# Patient Record
Sex: FEMALE | Race: OTHER | HISPANIC OR LATINO | Employment: FULL TIME | URBAN - METROPOLITAN AREA
[De-identification: names, ages, dates, MRNs, and addresses within clinical notes are randomized per-mention and may not be internally consistent; named-entity substitution may affect disease eponyms.]

---

## 2017-01-14 ENCOUNTER — HOSPITAL ENCOUNTER (OUTPATIENT)
Dept: LAB | Facility: MEDICAL CENTER | Age: 27
End: 2017-01-14
Attending: OBSTETRICS & GYNECOLOGY
Payer: COMMERCIAL

## 2017-01-14 LAB
ERYTHROCYTE [DISTWIDTH] IN BLOOD BY AUTOMATED COUNT: 46.2 FL (ref 35.9–50)
EST. AVERAGE GLUCOSE BLD GHB EST-MCNC: 94 MG/DL
GLUCOSE 1H P CHAL SERPL-MCNC: 148 MG/DL (ref 65–180)
GLUCOSE 2H P CHAL SERPL-MCNC: 174 MG/DL (ref 65–155)
GLUCOSE 3H P CHAL SERPL-MCNC: 125 MG/DL (ref 65–140)
GLUCOSE PRE 100 G GLC PO SERPL-MCNC: 85 MG/DL (ref 65–95)
HBA1C MFR BLD: 4.9 % (ref 0–5.6)
HCT VFR BLD AUTO: 41.1 % (ref 37–47)
HGB BLD-MCNC: 14 G/DL (ref 12–16)
MCH RBC QN AUTO: 33.3 PG (ref 27–33)
MCHC RBC AUTO-ENTMCNC: 34.1 G/DL (ref 33.6–35)
MCV RBC AUTO: 97.6 FL (ref 81.4–97.8)
PLATELET # BLD AUTO: 235 K/UL (ref 164–446)
PMV BLD AUTO: 10.9 FL (ref 9–12.9)
RBC # BLD AUTO: 4.21 M/UL (ref 4.2–5.4)
WBC # BLD AUTO: 10.9 K/UL (ref 4.8–10.8)

## 2017-01-14 PROCEDURE — 85027 COMPLETE CBC AUTOMATED: CPT

## 2017-01-14 PROCEDURE — 82951 GLUCOSE TOLERANCE TEST (GTT): CPT

## 2017-01-14 PROCEDURE — 83036 HEMOGLOBIN GLYCOSYLATED A1C: CPT

## 2017-01-14 PROCEDURE — 82952 GTT-ADDED SAMPLES: CPT

## 2017-01-14 PROCEDURE — 36415 COLL VENOUS BLD VENIPUNCTURE: CPT

## 2017-02-11 ENCOUNTER — HOSPITAL ENCOUNTER (INPATIENT)
Facility: MEDICAL CENTER | Age: 27
LOS: 12 days | End: 2017-02-23
Attending: OBSTETRICS & GYNECOLOGY | Admitting: OBSTETRICS & GYNECOLOGY
Payer: COMMERCIAL

## 2017-02-11 ENCOUNTER — APPOINTMENT (OUTPATIENT)
Dept: RADIOLOGY | Facility: MEDICAL CENTER | Age: 27
End: 2017-02-11
Attending: OBSTETRICS & GYNECOLOGY
Payer: COMMERCIAL

## 2017-02-11 LAB
APPEARANCE UR: CLEAR
BASOPHILS # BLD AUTO: 0.2 % (ref 0–1.8)
BASOPHILS # BLD: 0.02 K/UL (ref 0–0.12)
COLOR UR AUTO: YELLOW
CRYSTALS AMN MICRO: NORMAL
EOSINOPHIL # BLD AUTO: 0.06 K/UL (ref 0–0.51)
EOSINOPHIL NFR BLD: 0.6 % (ref 0–6.9)
ERYTHROCYTE [DISTWIDTH] IN BLOOD BY AUTOMATED COUNT: 42.9 FL (ref 35.9–50)
GLUCOSE UR QL STRIP.AUTO: NEGATIVE MG/DL
HCT VFR BLD AUTO: 38.2 % (ref 37–47)
HGB BLD-MCNC: 13 G/DL (ref 12–16)
HOLDING TUBE BB 8507: NORMAL
IMM GRANULOCYTES # BLD AUTO: 0.06 K/UL (ref 0–0.11)
IMM GRANULOCYTES NFR BLD AUTO: 0.6 % (ref 0–0.9)
KETONES UR QL STRIP.AUTO: NEGATIVE MG/DL
LEUKOCYTE ESTERASE UR QL STRIP.AUTO: NEGATIVE
LYMPHOCYTES # BLD AUTO: 2.51 K/UL (ref 1–4.8)
LYMPHOCYTES NFR BLD: 26.3 % (ref 22–41)
MCH RBC QN AUTO: 31.7 PG (ref 27–33)
MCHC RBC AUTO-ENTMCNC: 34 G/DL (ref 33.6–35)
MCV RBC AUTO: 93.2 FL (ref 81.4–97.8)
MONOCYTES # BLD AUTO: 0.68 K/UL (ref 0–0.85)
MONOCYTES NFR BLD AUTO: 7.1 % (ref 0–13.4)
NEUTROPHILS # BLD AUTO: 6.2 K/UL (ref 2–7.15)
NEUTROPHILS NFR BLD: 65.2 % (ref 44–72)
NITRITE UR QL STRIP.AUTO: NEGATIVE
NRBC # BLD AUTO: 0 K/UL
NRBC BLD AUTO-RTO: 0 /100 WBC
PH UR STRIP.AUTO: 7 [PH]
PLATELET # BLD AUTO: 226 K/UL (ref 164–446)
PMV BLD AUTO: 10.7 FL (ref 9–12.9)
PROT UR QL STRIP: NEGATIVE MG/DL
RBC # BLD AUTO: 4.1 M/UL (ref 4.2–5.4)
RBC UR QL AUTO: NEGATIVE
SP GR UR: 1.01
WBC # BLD AUTO: 9.5 K/UL (ref 4.8–10.8)

## 2017-02-11 PROCEDURE — 85025 COMPLETE CBC W/AUTO DIFF WBC: CPT

## 2017-02-11 PROCEDURE — A9270 NON-COVERED ITEM OR SERVICE: HCPCS | Performed by: OBSTETRICS & GYNECOLOGY

## 2017-02-11 PROCEDURE — 59025 FETAL NON-STRESS TEST: CPT | Performed by: OBSTETRICS & GYNECOLOGY

## 2017-02-11 PROCEDURE — 36415 COLL VENOUS BLD VENIPUNCTURE: CPT

## 2017-02-11 PROCEDURE — 700111 HCHG RX REV CODE 636 W/ 250 OVERRIDE (IP): Performed by: OBSTETRICS & GYNECOLOGY

## 2017-02-11 PROCEDURE — 89060 EXAM SYNOVIAL FLUID CRYSTALS: CPT

## 2017-02-11 PROCEDURE — 81002 URINALYSIS NONAUTO W/O SCOPE: CPT

## 2017-02-11 PROCEDURE — 700105 HCHG RX REV CODE 258: Performed by: OBSTETRICS & GYNECOLOGY

## 2017-02-11 PROCEDURE — 770002 HCHG ROOM/CARE - OB PRIVATE (112)

## 2017-02-11 PROCEDURE — 700102 HCHG RX REV CODE 250 W/ 637 OVERRIDE(OP): Performed by: OBSTETRICS & GYNECOLOGY

## 2017-02-11 PROCEDURE — 302790 HCHG STAT ANTEPARTUM CARE, DAILY

## 2017-02-11 PROCEDURE — 87653 STREP B DNA AMP PROBE: CPT

## 2017-02-11 PROCEDURE — 76815 OB US LIMITED FETUS(S): CPT

## 2017-02-11 RX ORDER — NIFEDIPINE 10 MG/1
20 CAPSULE ORAL PRN
Status: DISCONTINUED | OUTPATIENT
Start: 2017-02-11 | End: 2017-02-23 | Stop reason: HOSPADM

## 2017-02-11 RX ORDER — AMPICILLIN 2 G/1
2 INJECTION, POWDER, FOR SOLUTION INTRAVENOUS EVERY 6 HOURS
Status: COMPLETED | OUTPATIENT
Start: 2017-02-11 | End: 2017-02-13

## 2017-02-11 RX ORDER — TERBUTALINE SULFATE 1 MG/ML
0.25 INJECTION, SOLUTION SUBCUTANEOUS PRN
Status: DISCONTINUED | OUTPATIENT
Start: 2017-02-11 | End: 2017-02-21

## 2017-02-11 RX ORDER — DIPHENHYDRAMINE HCL 25 MG
25 TABLET ORAL NIGHTLY PRN
Status: DISCONTINUED | OUTPATIENT
Start: 2017-02-11 | End: 2017-02-23 | Stop reason: HOSPADM

## 2017-02-11 RX ORDER — BETAMETHASONE SODIUM PHOSPHATE AND BETAMETHASONE ACETATE 3; 3 MG/ML; MG/ML
12 INJECTION, SUSPENSION INTRA-ARTICULAR; INTRALESIONAL; INTRAMUSCULAR; SOFT TISSUE EVERY 24 HOURS
Status: COMPLETED | OUTPATIENT
Start: 2017-02-11 | End: 2017-02-12

## 2017-02-11 RX ORDER — DOCUSATE SODIUM 100 MG/1
100 CAPSULE, LIQUID FILLED ORAL 2 TIMES DAILY PRN
Status: DISCONTINUED | OUTPATIENT
Start: 2017-02-11 | End: 2017-02-23 | Stop reason: HOSPADM

## 2017-02-11 RX ORDER — ACETAMINOPHEN 325 MG/1
650 TABLET ORAL EVERY 4 HOURS PRN
Status: DISCONTINUED | OUTPATIENT
Start: 2017-02-11 | End: 2017-02-23 | Stop reason: HOSPADM

## 2017-02-11 RX ADMIN — BETAMETHASONE SODIUM PHOSPHATE AND BETAMETHASONE ACETATE 12 MG: 3; 3 INJECTION, SUSPENSION INTRA-ARTICULAR; INTRALESIONAL; INTRAMUSCULAR at 18:17

## 2017-02-11 RX ADMIN — AMPICILLIN SODIUM 2 G: 2 INJECTION, POWDER, FOR SOLUTION INTRAMUSCULAR; INTRAVENOUS at 23:30

## 2017-02-11 RX ADMIN — ERYTHROMYCIN LACTOBIONATE 250 MG: 500 INJECTION, POWDER, LYOPHILIZED, FOR SOLUTION INTRAVENOUS at 21:32

## 2017-02-11 RX ADMIN — DIPHENHYDRAMINE HCL 25 MG: 25 TABLET ORAL at 21:40

## 2017-02-11 RX ADMIN — AMPICILLIN SODIUM 2 G: 2 INJECTION, POWDER, FOR SOLUTION INTRAMUSCULAR; INTRAVENOUS at 17:30

## 2017-02-11 ASSESSMENT — COPD QUESTIONNAIRES
COPD SCREENING SCORE: 0
HAVE YOU SMOKED AT LEAST 100 CIGARETTES IN YOUR ENTIRE LIFE: NO/DON'T KNOW
DURING THE PAST 4 WEEKS HOW MUCH DID YOU FEEL SHORT OF BREATH: NONE/LITTLE OF THE TIME
DO YOU EVER COUGH UP ANY MUCUS OR PHLEGM?: NO/ONLY WITH OCCASIONAL COLDS OR INFECTIONS

## 2017-02-11 ASSESSMENT — PAIN SCALES - GENERAL
PAINLEVEL_OUTOF10: 0
PAINLEVEL_OUTOF10: 0

## 2017-02-11 ASSESSMENT — LIFESTYLE VARIABLES
EVER_SMOKED: NEVER
DO YOU DRINK ALCOHOL: NO
DO YOU DRINK ALCOHOL: NO

## 2017-02-11 ASSESSMENT — PATIENT HEALTH QUESTIONNAIRE - PHQ9
SUM OF ALL RESPONSES TO PHQ QUESTIONS 1-9: 0
2. FEELING DOWN, DEPRESSED, IRRITABLE, OR HOPELESS: NOT AT ALL
SUM OF ALL RESPONSES TO PHQ9 QUESTIONS 1 AND 2: 0
1. LITTLE INTEREST OR PLEASURE IN DOING THINGS: NOT AT ALL

## 2017-02-11 NOTE — IP AVS SNAPSHOT
[facility] Access Code: Activation code not generated  Current [facility] Status: Active    SingOnhart  A secure, online tool to manage your health information     [employer]’s [facility]® is a secure, online tool that connects you to your personalized health information from the privacy of your home -- day or night - making it very easy for you to manage your healthcare. Once the activation process is completed, you can even access your medical information using the [facility] cailin, which is available for free in the Apple Cailin store or Google Play store.     [facility] provides the following levels of access (as shown below):   My Chart Features   [facility] Primary Care Doctor [facility]  Specialists [facility]  Urgent  Care Non-[facility]  Primary Care  Doctor   Email your healthcare team securely and privately 24/7 X X X X   Manage appointments: schedule your next appointment; view details of past/upcoming appointments X      Request prescription refills. X      View recent personal medical records, including lab and immunizations X X X X   View health record, including health history, allergies, medications X X X X   Read reports about your outpatient visits, procedures, consult and ER notes X X X X   See your discharge summary, which is a recap of your hospital and/or ER visit that includes your diagnosis, lab results, and care plan. X X       How to register for [facility]:  1. Go to  [email or web address].  2. Click on the Sign Up Now box, which takes you to the New Member Sign Up page. You will need to provide the following information:  a. Enter your [facility] Access Code exactly as it appears at the top of this page. (You will not need to use this code after you’ve completed the sign-up process. If you do not sign up before the expiration date, you must request a new code.)   b. Enter your date of birth.   c. Enter your home email address.   d. Click Submit, and follow the next screen’s instructions.  3. Create a [facility] ID. This will  be your [employer] Stack login ID and cannot be changed, so think of one that is secure and easy to remember.  4. Create a [employer] password. You can change your password at any time.  5. Enter your Password Reset Question and Answer. This can be used at a later time if you forget your password.   6. Enter your e-mail address. This allows you to receive e-mail notifications when new information is available in [employer] Stack.  7. Click Sign Up. You can now view your health information.    For assistance activating your [employer] account, call [phone]

## 2017-02-11 NOTE — IP AVS SNAPSHOT
2/23/2017          Dee Schreiber  [address] [place] [address]    Dear Dee:    [facility] wants to ensure your discharge home is safe and you or your loved ones have had all your questions answered regarding your care after you leave the hospital.    You may receive a telephone call within two days of your discharge.  This call is to make certain you understand your discharge instructions as well as ensure we provided you with the best care possible during your stay with us.     The call will only last approximately 3-5 minutes and will be done by a nurse.    Once again, we want to ensure your discharge home is safe and that you have a clear understanding of any next steps in your care.  If you have any questions or concerns, please do not hesitate to contact us, we are here for you.  Thank you for choosing [facility] for your healthcare needs.    Sincerely,    Josh Roblero    [facility]

## 2017-02-11 NOTE — IP AVS SNAPSHOT
Home Care Instructions                                                                                                                Dee Schreiber   MRN: [id number]    Department:  POST PARTUM 31   2017           Follow-up Information     1. Follow up with Rachel Gardner M.D. In 6 weeks.    Specialty:  OB/Gyn    Why:  Postpartum examination.    Contact information    [address]  [place] [address]  [phone]          2. Follow up with Rachel Gardner M.D. In 2 weeks.    Specialty:  OB/Gyn    Why:  follow up on depressed mood.    Contact information    [address]  [place] [address]  [phone]         I assume responsibility for securing a follow-up  Screening blood test on my baby within the specified date range.    -                  Discharge Instructions       POSTPARTUM DISCHARGE INSTRUCTIONS FOR MOM    YOB: 1990   Age: 26 y.o.               Admit Date: 2017     Discharge Date: 2017  Attending Doctor:  Rachel Gardner M.D.                  Allergies:  Nkda    Discharged to home by car. Discharged via walking, hospital escort: Yes.  Special equipment needed: Not Applicable  Belongings with: Personal  Be sure to schedule a follow-up appointment with your primary care doctor or any specialists as instructed.     Discharge Plan:   Diet Plan: Discussed  Activity Level: Discussed  Confirmed Follow up Appointment: Patient to Call and Schedule Appointment  Confirmed Symptoms Management: Discussed  Medication Reconciliation Updated: Yes  Influenza Vaccine Indication: Not indicated: Previously immunized this influenza season and > 8 years of age    REASONS TO CALL YOUR OBSTETRICIAN:  1.   Persistent fever or shaking chills (Temperature higher than 100.4)  2.   Heavy bleeding (soaking more than 1 pad per hour); Passing clots  3.   Foul odor from vagina  4.   Mastitis (Breast infection; breast pain, chills, fever, redness)  5.   Urinary pain, burning or  "frequency  6.   Episiotomy infection  7.   Abdominal incision infection  8.   Severe depression longer than 24 hours    HAND WASHING  · Prior to handling the baby.  · Before breastfeeding or bottle feeding baby.  · After using the bathroom or changing the baby's diaper.    WOUND CARE  Ask your physician for additional care instructions.  In general:    ·  Incision:      · Keep clean and dry.    · Do NOT lift anything heavier than your baby for up to 6 weeks.    · There should not be any opening or pus.      VAGINAL CARE  · Nothing inside vagina for 6 weeks: no sexual intercourse, tampons or douching.  · Bleeding may continue for 2-4 weeks.  Amount may vary.    · Call your physician for heavy bleeding which means soaking more than 1 pad per hour    BIRTH CONTROL  · It is possible to become pregnant at any time after delivery and while breastfeeding.  · Plan to discuss a method of birth control with your physician at your follow up visit. visit.    DIET AND ELIMINATION  · Eating more fiber (bran cereal, fruits, and vegetables) and drinking plenty of fluids will help to avoid constipation.  · Urinary frequency after childbirth is normal.    POSTPARTUM BLUES  During the first few days after birth, you may experience a sense of the \"blues\" which may include impatience, irritability or even crying.  These feeling come and go quickly.  However, as many as 1 in 10 women experience emotional symptoms known as postpartum depression.    Postpartum depression:  May start as early as the second or third day after delivery or take several weeks or months to develop.  Symptoms of \"blues\" are present, but are more intense:  Crying spells; loss of appetite; feelings of hopelessness or loss of control; fear of touching the baby; over concern or no concern at all about the baby; little or no concern about your own appearance/caring for yourself; and/or inability to sleep or excessive sleeping.  Contact your physician if you " "are experiencing any of these symptoms.    Crisis Hotline:  · [place] Crisis Hotline:  3-263-SJBOUZW  Or 1-872.149.4321  · [place] Crisis Hotline:  1-467.254.6888  Or 892-983-9035    PREVENTING SHAKEN BABY:  If you are angry or stressed, PUT THE BABY IN THE CRIB, step away, take some deep breaths, and wait until you are calm to care for the baby.  DO NOT SHAKE THE BABY.  You are not alone, call a supporter for help.    · Crisis Call Center 24/7 crisis line 506-870-1929 or 1-817.649.6085  · You can also text them, text \"ANSWER\" to 229119    QUIT SMOKING/TOBACCO USE:  I understand the use of any tobacco products increases my chance of suffering from future heart disease and could cause other illnesses which may shorten my life. Quitting the use of tobacco products is the single most important thing I can do to improve my health. For further information on smoking / tobacco cessation call a Toll Free Quit Line at 1-973.312.3436 (*National [employer] [place]) or 1-693.943.7988 (American Lung Association) or you can access the web based program at www.lungusa.org.    · [place] Tobacco Users Help Line:  (564) 993-1734       Toll Free: 1-276.714.1409  · Quit Tobacco Program [facility] (270)387-1076    DEPRESSION / SUICIDE RISK:  As you are discharged from this [facility], it is important to learn how to keep safe from harming yourself.    Recognize the warning signs:  · Abrupt changes in personality, positive or negative- including increase in energy   · Giving away possessions  · Change in eating patterns- significant weight changes-  positive or negative  · Change in sleeping patterns- unable to sleep or sleeping all the time   · Unwillingness or inability to communicate  · Depression  · Unusual sadness, discouragement and loneliness  · Talk of wanting to die  · Neglect of personal appearance   · Rebelliousness- reckless behavior  · Withdrawal from people/activities they love  · Confusion- " inability to concentrate     If you or a loved one observes any of these behaviors or has concerns about self-harm, here's what you can do:  · Talk about it- your feelings and reasons for harming yourself  · Remove any means that you might use to hurt yourself (examples: pills, rope, extension cords, firearm)  · Get professional help from the community (Mental Health, Substance Abuse, psychological counseling)  · Do not be alone:Call your Safe Contact- someone whom you trust who will be there for you.  · Call your local CRISIS HOTLINE 766-7697 or 951-340-3659  · Call your local Children's Mobile Crisis Response Team [place] (989) 412-6953 or www.NanoGram  · Call the toll free National Suicide Prevention Hotlines   · National Suicide Prevention Lifeline 421-315-NSKM (0127)  · National Hope Line Network 800-SUICIDE (042-7136)    DISCHARGE SURVEY:  Thank you for choosing [facility].  We hope we provided you with very good care.  You may be receiving a survey in the mail.  Please fill it out.  Your opinion is valuable to us.    ADDITIONAL EDUCATIONAL MATERIALS GIVEN TO PATIENT:        My signature on this form indicates that:  1.  I have reviewed and understand the above information  2.  My questions regarding this information have been answered to my satisfaction.  3.  I have formulated a plan with my discharge nurse to obtain my prescribed medication for home.         Discharge Medication Instructions:    Below are the medications your physician expects you to take upon discharge:    Review all your home medications and newly ordered medications with your doctor and/or pharmacist. Follow medication instructions as directed by your doctor and/or pharmacist.    Please keep your medication list with you and share with your physician.               Medication List      START taking these medications        Instructions    ibuprofen 600 MG Tabs   Last time this was given:  600 mg on 2/23/2017  3:23 AM      Commonly known as:  MOTRIN    Take 1 Tab by mouth every 6 hours as needed (For cramping after delivery; do not give if patient is receiving ketorolac (Toradol)).   Dose:  600 mg       oxycodone-acetaminophen 5-325 MG Tabs   Last time this was given:  1 Tab on 2/23/2017  3:23 AM   Commonly known as:  PERCOCET    Take 1 Tab by mouth every four hours as needed (for Moderate Pain (Pain Scale 4-6) after delivery).   Dose:  1 Tab         CONTINUE taking these medications        Instructions    PRENATAL PO   Last time this was given:  1 Tab on 2/23/2017  7:58 AM    Take 2 Tabs by mouth every day.   Dose:  2 Tab               Crisis Hotline:     [place] Crisis Hotline:  5-679-DBMBDKZ or 1-678.604.2678    [place] Crisis Hotline:    1-494.901.2933 or 579-480-0318        Disclaimer           _____________________________________                     __________       ________       Patient/Mother Signature or Legal                          Date                   Time

## 2017-02-12 LAB — GP B STREP DNA SPEC QL NAA+PROBE: POSITIVE

## 2017-02-12 PROCEDURE — 59025 FETAL NON-STRESS TEST: CPT | Performed by: OBSTETRICS & GYNECOLOGY

## 2017-02-12 PROCEDURE — 700102 HCHG RX REV CODE 250 W/ 637 OVERRIDE(OP): Performed by: OBSTETRICS & GYNECOLOGY

## 2017-02-12 PROCEDURE — 700111 HCHG RX REV CODE 636 W/ 250 OVERRIDE (IP): Performed by: OBSTETRICS & GYNECOLOGY

## 2017-02-12 PROCEDURE — 770002 HCHG ROOM/CARE - OB PRIVATE (112)

## 2017-02-12 PROCEDURE — A9270 NON-COVERED ITEM OR SERVICE: HCPCS | Performed by: OBSTETRICS & GYNECOLOGY

## 2017-02-12 PROCEDURE — 36415 COLL VENOUS BLD VENIPUNCTURE: CPT

## 2017-02-12 PROCEDURE — 700105 HCHG RX REV CODE 258: Performed by: OBSTETRICS & GYNECOLOGY

## 2017-02-12 RX ORDER — AMOXICILLIN 250 MG/1
250 CAPSULE ORAL EVERY 8 HOURS
Status: COMPLETED | OUTPATIENT
Start: 2017-02-13 | End: 2017-02-18

## 2017-02-12 RX ORDER — AMPICILLIN 2 G/1
INJECTION, POWDER, FOR SOLUTION INTRAVENOUS
Status: ACTIVE
Start: 2017-02-12 | End: 2017-02-12

## 2017-02-12 RX ORDER — SODIUM CHLORIDE, SODIUM LACTATE, POTASSIUM CHLORIDE, CALCIUM CHLORIDE 600; 310; 30; 20 MG/100ML; MG/100ML; MG/100ML; MG/100ML
INJECTION, SOLUTION INTRAVENOUS
Status: ACTIVE
Start: 2017-02-12 | End: 2017-02-13

## 2017-02-12 RX ORDER — ERYTHROMYCIN 250 MG/1
250 TABLET, DELAYED RELEASE ORAL 3 TIMES DAILY
Status: COMPLETED | OUTPATIENT
Start: 2017-02-13 | End: 2017-02-18

## 2017-02-12 RX ADMIN — ERYTHROMYCIN LACTOBIONATE 250 MG: 500 INJECTION, POWDER, LYOPHILIZED, FOR SOLUTION INTRAVENOUS at 22:00

## 2017-02-12 RX ADMIN — AMPICILLIN SODIUM 2 G: 2 INJECTION, POWDER, FOR SOLUTION INTRAMUSCULAR; INTRAVENOUS at 23:30

## 2017-02-12 RX ADMIN — ERYTHROMYCIN LACTOBIONATE 250 MG: 500 INJECTION, POWDER, LYOPHILIZED, FOR SOLUTION INTRAVENOUS at 11:25

## 2017-02-12 RX ADMIN — DIPHENHYDRAMINE HCL 25 MG: 25 TABLET ORAL at 22:40

## 2017-02-12 RX ADMIN — AMPICILLIN SODIUM 2 G: 2 INJECTION, POWDER, FOR SOLUTION INTRAMUSCULAR; INTRAVENOUS at 06:00

## 2017-02-12 RX ADMIN — AMPICILLIN SODIUM 2 G: 2 INJECTION, POWDER, FOR SOLUTION INTRAMUSCULAR; INTRAVENOUS at 17:30

## 2017-02-12 RX ADMIN — ERYTHROMYCIN LACTOBIONATE 250 MG: 500 INJECTION, POWDER, LYOPHILIZED, FOR SOLUTION INTRAVENOUS at 04:25

## 2017-02-12 RX ADMIN — ERYTHROMYCIN LACTOBIONATE 250 MG: 500 INJECTION, POWDER, LYOPHILIZED, FOR SOLUTION INTRAVENOUS at 16:25

## 2017-02-12 RX ADMIN — AMPICILLIN SODIUM 2 G: 2 INJECTION, POWDER, FOR SOLUTION INTRAMUSCULAR; INTRAVENOUS at 12:29

## 2017-02-12 RX ADMIN — BETAMETHASONE SODIUM PHOSPHATE AND BETAMETHASONE ACETATE 12 MG: 3; 3 INJECTION, SUSPENSION INTRA-ARTICULAR; INTRALESIONAL; INTRAMUSCULAR at 17:24

## 2017-02-12 ASSESSMENT — COPD QUESTIONNAIRES
DURING THE PAST 4 WEEKS HOW MUCH DID YOU FEEL SHORT OF BREATH: NONE/LITTLE OF THE TIME
DO YOU EVER COUGH UP ANY MUCUS OR PHLEGM?: NO/ONLY WITH OCCASIONAL COLDS OR INFECTIONS
HAVE YOU SMOKED AT LEAST 100 CIGARETTES IN YOUR ENTIRE LIFE: NO/DON'T KNOW
COPD SCREENING SCORE: 0

## 2017-02-12 ASSESSMENT — PAIN SCALES - GENERAL
PAINLEVEL_OUTOF10: 0

## 2017-02-12 ASSESSMENT — LIFESTYLE VARIABLES
DO YOU DRINK ALCOHOL: NO
DO YOU DRINK ALCOHOL: NO

## 2017-02-12 ASSESSMENT — PATIENT HEALTH QUESTIONNAIRE - PHQ9
1. LITTLE INTEREST OR PLEASURE IN DOING THINGS: NOT AT ALL
2. FEELING DOWN, DEPRESSED, IRRITABLE, OR HOPELESS: NOT AT ALL
SUM OF ALL RESPONSES TO PHQ9 QUESTIONS 1 AND 2: 0
SUM OF ALL RESPONSES TO PHQ QUESTIONS 1-9: 0

## 2017-02-12 NOTE — H&P
ADMITTING DIAGNOSES:  1.  Intrauterine pregnancy at 32 and 4/7 weeks.  2.  Premature  rupture of membranes.    HISTORY:  This is a 26-year-old G2, P1 at 32 and 4/7 weeks, who presented to   labor and delivery this afternoon with complaints of possible rupture of   membranes at 1500 hours.  She was evaluated and a sterile speculum exam was   performed, which showed pooling, fern positive and nitrazine positive and it   was recommended that the patient be admitted to labor and delivery for IV   antibiotics and steroids for fetal lung maturity and the patient agrees.    Prenatal care has been with Dr. Rachel Gardner.  Her estimated date of   confinement is 2017 making her 32 weeks and 4 days.    PRENATAL LABS:  Her blood type is O positive, antibody screen negative, RPR   nonreactive, rubella immune, hepatitis B surface antigen negative, hepatitis C   negative, HIV negative.  Her multiple marker screen was normal.  Her 1-hour   Glucola was abnormal.  She performed a 3-hour oral glucose tolerance test and   this was normal.  Group B strep status is pending with this admission.    Complications with the pregnancy include a failed 1-hour oral glucose   tolerance test, but a normal 3-hour.    ALLERGIES:  She has no known drug allergies.    MEDICATIONS:  Include prenatal vitamins.    PAST MEDICAL HISTORY:  Negative.    PAST SURGICAL HISTORY:  Significant for laparoscopic cholecystectomy and a   dilation and curettage for a uterine polyp.    SOCIAL HISTORY:  She denies tobacco, alcohol or IV drug use.    GYNECOLOGIC HISTORY:  She denies any HSV.    OBSTETRICAL HISTORY:   1 with a normal spontaneous vaginal delivery, 8   pounds 2 ounces.   2 is her current pregnancy and this is the same   father of the baby with  1, she had no problems with  labor and   delivered at term without complication.    PHYSICAL EXAMINATION:  VITAL SIGNS:  Temperature is 99.4, pulse is 88, and blood pressure  is 102/57.  GENERAL:  She is pleasant, in no apparent distress.  LUNGS:  Clear to auscultation bilaterally.  CARDIOVASCULAR:  Regular rate and rhythm.  ABDOMEN:  Gravid and nontender.  EXTREMITIES:  Show no clubbing, cyanosis or edema.  She has no cords or Homans   sign.  Fetal heart tones are in the 140s and a category 1 tracing.  Tocometry   shows no contractions.  Cervical exam was deferred.  She had a limited OB   ultrasound performed and the fetus is in the vertex presentation and her SARA   is 10.     LABORATORY STUDIES:  White count is 9.5, H and H are 13 and 38, platelet count   is 226.    IMPRESSION:  This is a 26-year-old G2, P1 at 32 and 4/7 weeks with premature    rupture of membranes who is in fair condition.    PLAN:  1.  The patient will be admitted to labor and delivery antepartum.  2.  IV antibiotics will be started.  She will be started on ampicillin 2 g IV   every 6 hours for 48 hours and E-mycin  250 mg IV q. 6 hours for 48 hours.  She would   then be changed to p.o. amoxicillin 250 mg p.o. q. 8 hours for 5 days, and   erythromycin 250 mg p.o. every 8 hours for 5 days.  3.  She will be given betamethasone 12.5 mg IM q. 24 hours x2 doses.  4.  She will have continuous fetal heart rate monitoring and tocometry for the   initial 24 hours.  5.  Tocolysis will be used to help keep her pregnant until she is in her   steroid window.  6.   intensive care nursery is aware that the patient is on labor and   delivery.       ____________________________________     MD JOHNNY GARCIA / CARLOS    DD:  2017 23:55:42  DT:  2017 01:27:29    D#:  [id number]  Job#:  [id number]    cc: MONIKA PRATHER MD, RADHA LEACH MD

## 2017-02-12 NOTE — PROGRESS NOTES
1600-pt presents from home with c/o possible SROM at around 1500 today, states that she was lying in bed and felt leaking and has continued to feel leaking since then, no c/o bleeding, pain or uc's, states baby is moving normally, placed on external monitors, vs taken, SSE performed, slight pooling noted, fern slide collected and sent, no SVE at this time, ua dipped negative  1700-fern back positive, TC Dr Wagoner, report given, admission orders received for antepartum  1715-report given to DEDRA Padilla RN, POC discussed

## 2017-02-12 NOTE — PROGRESS NOTES
1900--Report received and plan of care discussed.  GA = 32.4 wks.  Patient states she is feeling well, denies feeling pain, states has occasional cramping but no painful UC's.  Leaking clear amniotic fluid without foul odor, no bleeding.  Leaking only smal amounts.  States baby has been moving well.  No uterine tenderness.  2115--Saline lock not working.  Discontinued and another started in L inner arm #18.    2200--Patient settling down for sleep.  Remains comfortable.  0000--Dr. Wagoner in to see patient.  Patient states she is feeling well, the Benadryl helped her relax, denies feeling UC's, denies vaginal bleeding and only leaking small amounts of clear amniotic fluid with no foul smell.  Denies uterine tenderness.  Patient afebrile.  States baby has been moving.    0400--Patient doing well.  Has been sleeping on and off.  States baby has been moving well, denies feeling UC's, No uterine tenderness, no vaginal bleeding, small amount of leaking of amniotic fluid without foul odor and patient afebrile.  0630--Patient up to the bathroom and states there was slightly pink tinged amniotic fluid.  0700--Report to oncoming RN and plan of care discussed.

## 2017-02-12 NOTE — PROGRESS NOTES
HD #1  32 5/7 PPROM    S:  Pt had some mild cramping.  Scant pink tinged discharge.  + FM.  Minimal LOF.  Discussed plan for IV Abx for 48 hours then change to PO.    O:  T 97.8 P 99  /59  Reactive NST  [place]: none  Amp  E-Mycin  Betamethasone X 1    A/P:  IUP at 32 5/7 weeks, PPROM - fainr  1.  Continue IV abx for 48 hours then change to PO Amoxicillin 250 mg PO Q 8 hours for 5 days and E-mycin 250 mg PO Q 8 hours for 5 days  2.  NICU to see pt today  3.  Reassuring fetal monitoring.  4.  Deliver for signs of chorio

## 2017-02-12 NOTE — CARE PLAN
Problem: Infection  Goal: Will remain free from infection  Intervention: Assess signs and symptoms of infection  Went over s/s of infection since pt is ruptured      Problem: Knowledge Deficit  Goal: Knowledge of disease process/condition, treatment plan, diagnostic tests, and medications will improve  Intervention: Assess knowledge level of disease process/condition, treatment plan, diagnostic tests, and medications  Pt encouraged to ask questions

## 2017-02-12 NOTE — CARE PLAN
Problem: Knowledge Deficit  Goal: Patient/Support Person demonstrates understanding regarding condition, prognosis and treatment needs during the pregnancy  Outcome: PROGRESSING SLOWER THAN EXPECTED  Patient and  are participating in her POC.  All questions asked are answered to her understanding.  No problems @ this time.

## 2017-02-12 NOTE — PROGRESS NOTES
0700- Report received from ADDISON Dozier RN- poc discussed  0800- pt resting no c/o pain, uc's, small amount of clear fluid out, pt states she had a small amount of pink discharge when she wiped this am, +FM  1035- nst complete  1200- pt up to take a shower  1600- pt resting no complaints  1900- Bedside report given to ADDISON Dozier RN- poc discussed

## 2017-02-12 NOTE — PROGRESS NOTES
1715: Assumed care of pt AAO, denies pain or UC. Pt transferred to room 229 via wheelchair, POC discussed, pt and  Jb verbalized understanding.

## 2017-02-12 NOTE — CARE PLAN
Problem: Powerlessness  Goal: Patient will verbalize increased feelings of control or understanding  Outcome: PROGRESSING AS EXPECTED  Patient given choices in her care where possible.  Patient participating in her POC.  Grouping care in order to let patient sleep.

## 2017-02-13 PROCEDURE — 59025 FETAL NON-STRESS TEST: CPT | Performed by: OBSTETRICS & GYNECOLOGY

## 2017-02-13 PROCEDURE — 700105 HCHG RX REV CODE 258: Performed by: OBSTETRICS & GYNECOLOGY

## 2017-02-13 PROCEDURE — 700111 HCHG RX REV CODE 636 W/ 250 OVERRIDE (IP): Performed by: OBSTETRICS & GYNECOLOGY

## 2017-02-13 PROCEDURE — A9270 NON-COVERED ITEM OR SERVICE: HCPCS | Performed by: OBSTETRICS & GYNECOLOGY

## 2017-02-13 PROCEDURE — 770002 HCHG ROOM/CARE - OB PRIVATE (112)

## 2017-02-13 PROCEDURE — 700112 HCHG RX REV CODE 229: Performed by: OBSTETRICS & GYNECOLOGY

## 2017-02-13 PROCEDURE — 302790 HCHG STAT ANTEPARTUM CARE, DAILY

## 2017-02-13 PROCEDURE — 700102 HCHG RX REV CODE 250 W/ 637 OVERRIDE(OP): Performed by: OBSTETRICS & GYNECOLOGY

## 2017-02-13 RX ADMIN — AMOXICILLIN 250 MG: 250 CAPSULE ORAL at 13:51

## 2017-02-13 RX ADMIN — ERYTHROMYCIN 250 MG: 250 TABLET, DELAYED RELEASE ORAL at 20:50

## 2017-02-13 RX ADMIN — ERYTHROMYCIN 250 MG: 250 TABLET, DELAYED RELEASE ORAL at 14:53

## 2017-02-13 RX ADMIN — AMOXICILLIN 250 MG: 250 CAPSULE ORAL at 21:46

## 2017-02-13 RX ADMIN — AMPICILLIN SODIUM 2 G: 2 INJECTION, POWDER, FOR SOLUTION INTRAMUSCULAR; INTRAVENOUS at 05:30

## 2017-02-13 RX ADMIN — DOCUSATE SODIUM 100 MG: 100 CAPSULE ORAL at 20:50

## 2017-02-13 RX ADMIN — ERYTHROMYCIN LACTOBIONATE 250 MG: 500 INJECTION, POWDER, LYOPHILIZED, FOR SOLUTION INTRAVENOUS at 04:00

## 2017-02-13 ASSESSMENT — COPD QUESTIONNAIRES
DO YOU EVER COUGH UP ANY MUCUS OR PHLEGM?: NO/ONLY WITH OCCASIONAL COLDS OR INFECTIONS
COPD SCREENING SCORE: 0
HAVE YOU SMOKED AT LEAST 100 CIGARETTES IN YOUR ENTIRE LIFE: NO/DON'T KNOW
DURING THE PAST 4 WEEKS HOW MUCH DID YOU FEEL SHORT OF BREATH: NONE/LITTLE OF THE TIME

## 2017-02-13 ASSESSMENT — PAIN SCALES - GENERAL
PAINLEVEL_OUTOF10: 0

## 2017-02-13 ASSESSMENT — PATIENT HEALTH QUESTIONNAIRE - PHQ9
2. FEELING DOWN, DEPRESSED, IRRITABLE, OR HOPELESS: NOT AT ALL
SUM OF ALL RESPONSES TO PHQ9 QUESTIONS 1 AND 2: 0
1. LITTLE INTEREST OR PLEASURE IN DOING THINGS: NOT AT ALL
SUM OF ALL RESPONSES TO PHQ QUESTIONS 1-9: 0

## 2017-02-13 ASSESSMENT — LIFESTYLE VARIABLES: DO YOU DRINK ALCOHOL: NO

## 2017-02-13 NOTE — CARE PLAN
Problem: Communication  Goal: The ability to communicate needs accurately and effectively will improve  Intervention: [place] patient and significant other/support system to call light to alert staff of needs  Pt encouraged to verbalize her needs      Problem: Infection  Goal: Will remain free from infection  Intervention: Assess signs and symptoms of infection  Went over s/s of infection and what to look for

## 2017-02-13 NOTE — CARE PLAN
Problem: Powerlessness  Goal: Patient will express individual needs/desires  Outcome: PROGRESSING AS EXPECTED  Patient expressing wishes and are being accommodated where possible.  Patient given choices where possible. Grouping care so patient can rest.  Doing well.

## 2017-02-13 NOTE — CARE PLAN
Problem: Knowledge Deficit  Goal: Knowledge of the prescribed therapeutic regimen will improve  Outcome: PROGRESSING AS EXPECTED  Patient and  participating in POC.  Awaiting Dr. Gardner' visit to hear further plans for her pregnancy.

## 2017-02-13 NOTE — PROGRESS NOTES
0700- Bedside report received from ADDISON Dozier RN- poc discussed  0730- pt resting pt has c/o large amount of clear fluid when up to the bathroom, no bleeding, no uc's, +FM  0950- nst complete   1200- pt resting no complaints  1600- pt sleeping  1900- Bedside report given to Liv RN- poc discussed

## 2017-02-13 NOTE — PROGRESS NOTES
1900--Report received and plan of care discussed.  GA = 32.5 wks.  Patient states she is feeling well, states baby has been moving, denies feeling UC's, denies vaginal bleeding or leaking @ this time.  Denies uterine tenderness.    2130--NST obtained.   2300--Patient settled for the night with  at bedside for support.  0400--Patient doing well.  Dr. Wagoner in to see patient earlier.  Patient states she had small amount of leaking of clear amniotic fluid, denies vaginal bleeding, denies feeling UJC's.  Denies uterine tenderness, patient states positive fetal movement, afebrile.  No other complaints of pain or discomfort.  0700--Report to oncoming RN and plan of care discussed.

## 2017-02-13 NOTE — PROGRESS NOTES
HD# 2  32 6/7 weeks, PPROM    S:  Pt having some minimal leaking.  No further pink discharge.  + FM No F/C.  No contractions    O:  T 98.2 P 104  /50  ABD:  Gravid, soft, NT  EXT: no edema, no cords or Homans  GBS +  Amp  E-mycin  S/P Betamethasone X 2    A/P:  IUP  At 32 6/7 weeks, PPROM - doing well  1.  PPROM:  Change to PO Abx today.   2.  GBS + :  Will need Abx restarted  for labor if undelivered in next 5 days  3.  FWB:  reassuring

## 2017-02-14 PROCEDURE — A9270 NON-COVERED ITEM OR SERVICE: HCPCS | Performed by: OBSTETRICS & GYNECOLOGY

## 2017-02-14 PROCEDURE — 770002 HCHG ROOM/CARE - OB PRIVATE (112)

## 2017-02-14 PROCEDURE — 302790 HCHG STAT ANTEPARTUM CARE, DAILY

## 2017-02-14 PROCEDURE — 700102 HCHG RX REV CODE 250 W/ 637 OVERRIDE(OP): Performed by: OBSTETRICS & GYNECOLOGY

## 2017-02-14 PROCEDURE — 59025 FETAL NON-STRESS TEST: CPT | Performed by: OBSTETRICS & GYNECOLOGY

## 2017-02-14 RX ADMIN — AMOXICILLIN 250 MG: 250 CAPSULE ORAL at 06:15

## 2017-02-14 RX ADMIN — ERYTHROMYCIN 250 MG: 250 TABLET, DELAYED RELEASE ORAL at 12:17

## 2017-02-14 RX ADMIN — ERYTHROMYCIN 250 MG: 250 TABLET, DELAYED RELEASE ORAL at 15:27

## 2017-02-14 RX ADMIN — ERYTHROMYCIN 250 MG: 250 TABLET, DELAYED RELEASE ORAL at 21:48

## 2017-02-14 RX ADMIN — AMOXICILLIN 250 MG: 250 CAPSULE ORAL at 14:07

## 2017-02-14 RX ADMIN — AMOXICILLIN 250 MG: 250 CAPSULE ORAL at 23:07

## 2017-02-14 ASSESSMENT — PAIN SCALES - GENERAL
PAINLEVEL_OUTOF10: 0

## 2017-02-14 NOTE — PROGRESS NOTES
[facility]  0750 awake.no leaking or bleeding.family at bedside  0830 breakfast  0948 NST obtained.a lot of fetal movement.  1100 patient is not leaking fluid,[place] or bleeding.very pleasant.  1230 lunch  1300 shower and linens.Dr herring into see patient and talk about POC.  1420 IV site changed  1600resting.

## 2017-02-14 NOTE — PROGRESS NOTES
1900 - bedside report received from ORSEANNA Ray RN. Pt care assumed.     1915- Pt denies any complaints at this time, requesting to shower. Pt toco removed and covering placed over IV.     2010- Pt returned to bed, states small amount of clear fluid leaked while in the shower. US and toco applied, pt laying in left tilt. Pt denies pain or any needs at this time.    2045- Pt resting comfortably, denies complaints or further leaking of fluid.

## 2017-02-14 NOTE — CONSULTS
I met with this 25yo pleasant woman at 33weeks with PROM 3 days ago (2/11).  History is significant for a EFW of 1670g, SARA of 10, and the patient is receiving Amoxicillin, Erythromycin, Nifedipine, and prenatal vitamins.     We discussed the anticipated NICU course of an infant at 33weeks, 34 weeks, and 35weeks, the risk of respiratory distress, feeding intolerance, necrotizing enterocolitis, infection, need for PICC line, jaundice, and intracranial hemorrhage. We also discussed anticipated length of stay.  Mother asked specifically the difference in length of stay of a 34 weeker vs a 35weeker when I brought up the NICU admission policy for any infant <35weeks.  We discussed the infectious risk posed to mother and baby as one factor in the OB and perinatologist complicated decision of when to deliver. She voiced understanding and asked appropriate questions.   Total estimated time spent: 15minutes.

## 2017-02-14 NOTE — PROGRESS NOTES
Labor Progress Note  HOD #3/pprom    Dee MoralesBenjieClemente   IUP at 33/PPROM      Subjective:  Pt without f/c, no contractions. Pt with occasional lof. Pt has completed her Betamethasone sterioid series.  Uterine contractions:no  Pain: Yes. Severity: none    Objective:   Filed Vitals:    02/14/17 0300 02/14/17 0616 02/14/17 0748 02/14/17 1303   BP: 89/49 86/50 105/61 118/66   Pulse: 85 91 90 96   Temp: 36.7 °C (98 °F) 36.4 °C (97.6 °F)     TempSrc:       Resp: 16 16     Height:       Weight:         Fetal heart variability: 140's category 1 no decels  [place]: none    Membranes ruptured: .yes/PPROM 2/11/15:15  GBBS positive  Meds:   Pnv, colace, amoxillin 250mg tid  Erythhromycin 250mg tid  Nifedipine 20mg prn    U/s VTX, eder: 10, EFW 1670grams  Labs:  No results found for this or any previous visit (from the past 24 hour(s)).    Assessment:   IUP at 33.0/pprom- pt s/p betamethasone series, afebrile. Pt no amoxicillin and erythromycin. Pt stable without evidence of chorio or labor. Plan continue in house until pt goes into labor or iol between 34-35 weeks ( Will d/w Dr. Padilla- [facility]).  GBBS positive- complete 7 day course of antibiotics and restart ampicillin when pt goes into labor or is induced  Fetal status- reassuring/vtx, efw 1670gram.    NICU aware, Dr Jones Neonatologist aware.  Rachel Gardner

## 2017-02-15 PROCEDURE — 302790 HCHG STAT ANTEPARTUM CARE, DAILY

## 2017-02-15 PROCEDURE — A9270 NON-COVERED ITEM OR SERVICE: HCPCS | Performed by: OBSTETRICS & GYNECOLOGY

## 2017-02-15 PROCEDURE — 700102 HCHG RX REV CODE 250 W/ 637 OVERRIDE(OP): Performed by: OBSTETRICS & GYNECOLOGY

## 2017-02-15 PROCEDURE — 770002 HCHG ROOM/CARE - OB PRIVATE (112)

## 2017-02-15 PROCEDURE — 59025 FETAL NON-STRESS TEST: CPT | Performed by: OBSTETRICS & GYNECOLOGY

## 2017-02-15 RX ADMIN — ERYTHROMYCIN 250 MG: 250 TABLET, DELAYED RELEASE ORAL at 21:00

## 2017-02-15 RX ADMIN — AMOXICILLIN 250 MG: 250 CAPSULE ORAL at 22:18

## 2017-02-15 RX ADMIN — AMOXICILLIN 250 MG: 250 CAPSULE ORAL at 06:07

## 2017-02-15 RX ADMIN — ERYTHROMYCIN 250 MG: 250 TABLET, DELAYED RELEASE ORAL at 09:08

## 2017-02-15 RX ADMIN — AMOXICILLIN 250 MG: 250 CAPSULE ORAL at 14:25

## 2017-02-15 RX ADMIN — ERYTHROMYCIN 250 MG: 250 TABLET, DELAYED RELEASE ORAL at 14:57

## 2017-02-15 ASSESSMENT — COPD QUESTIONNAIRES
COPD SCREENING SCORE: 0
HAVE YOU SMOKED AT LEAST 100 CIGARETTES IN YOUR ENTIRE LIFE: NO/DON'T KNOW
DO YOU EVER COUGH UP ANY MUCUS OR PHLEGM?: NO/ONLY WITH OCCASIONAL COLDS OR INFECTIONS
DURING THE PAST 4 WEEKS HOW MUCH DID YOU FEEL SHORT OF BREATH: NONE/LITTLE OF THE TIME

## 2017-02-15 ASSESSMENT — LIFESTYLE VARIABLES: DO YOU DRINK ALCOHOL: NO

## 2017-02-15 NOTE — PROGRESS NOTES
1900- Received report from YOLI Wheeler. Patient resting comfortably in bed. Patient has bathroom privileges and has no needs at this time. Patient Denies any UCs, +FM. Denies VB. States she has been leaking small amount of clear/yellow fluid that has no smell.     0300- Patient sleeping. No needs at this time.    0700- Report given to day shift RN. POC discussed.

## 2017-02-15 NOTE — PROGRESS NOTES
Labor Progress Note  HOD #4     Dee Schreiber   IUP at 33.1/PPROM    Subjective:  Pt without complaints. Pt without f/c, no contractions. Pt with good fm.  Uterine contractions:no  Pain: No    Objective:   Filed Vitals:    02/14/17 2355 02/15/17 0000 02/15/17 0354 02/15/17 0400   BP: 90/45 99/52 92/53 92/53   Pulse: 85 88 77 77   Temp:  36.3 °C (97.4 °F)  36.5 °C (97.7 °F)   TempSrc:       Resp:  16  16   Height:       Weight:         Fetal heart variability: 140's category 1 no decels  [place]: none    Membranes ruptured: .yes, PPROM 2/11    Meds:   Erythromycin, amoxicillin, pnv, colace  Labs:  No results found for this or any previous visit (from the past 24 hour(s)).    Assessment:   IUP at 33.1/PPROM- pt stable without evidence of labor or chorio. Plan is to continue with antibiotics x 7 days and restart ampicillin when pt goes into labor or gets induced. I d/w with [facility] May, and the plan is for iol at 35-36 weeks unless the patients clinical situation changes or she goes into labor. Pt's questions answered and she agrees with plan.  GBBS positive  VTX  Fetal status- reassuring    Rachel Gardner

## 2017-02-16 LAB
BASOPHILS # BLD AUTO: 0.5 % (ref 0–1.8)
BASOPHILS # BLD: 0.06 K/UL (ref 0–0.12)
EOSINOPHIL # BLD AUTO: 0.09 K/UL (ref 0–0.51)
EOSINOPHIL NFR BLD: 0.7 % (ref 0–6.9)
ERYTHROCYTE [DISTWIDTH] IN BLOOD BY AUTOMATED COUNT: 45.2 FL (ref 35.9–50)
HCT VFR BLD AUTO: 38.4 % (ref 37–47)
HGB BLD-MCNC: 12.8 G/DL (ref 12–16)
IMM GRANULOCYTES # BLD AUTO: 0.1 K/UL (ref 0–0.11)
IMM GRANULOCYTES NFR BLD AUTO: 0.8 % (ref 0–0.9)
LYMPHOCYTES # BLD AUTO: 2.61 K/UL (ref 1–4.8)
LYMPHOCYTES NFR BLD: 21.6 % (ref 22–41)
MCH RBC QN AUTO: 32.3 PG (ref 27–33)
MCHC RBC AUTO-ENTMCNC: 33.3 G/DL (ref 33.6–35)
MCV RBC AUTO: 97 FL (ref 81.4–97.8)
MONOCYTES # BLD AUTO: 1.07 K/UL (ref 0–0.85)
MONOCYTES NFR BLD AUTO: 8.9 % (ref 0–13.4)
NEUTROPHILS # BLD AUTO: 8.16 K/UL (ref 2–7.15)
NEUTROPHILS NFR BLD: 67.5 % (ref 44–72)
NRBC # BLD AUTO: 0 K/UL
NRBC BLD AUTO-RTO: 0 /100 WBC
PLATELET # BLD AUTO: 204 K/UL (ref 164–446)
PMV BLD AUTO: 10.1 FL (ref 9–12.9)
RBC # BLD AUTO: 3.96 M/UL (ref 4.2–5.4)
WBC # BLD AUTO: 12.1 K/UL (ref 4.8–10.8)

## 2017-02-16 PROCEDURE — 85025 COMPLETE CBC W/AUTO DIFF WBC: CPT

## 2017-02-16 PROCEDURE — 36415 COLL VENOUS BLD VENIPUNCTURE: CPT

## 2017-02-16 PROCEDURE — 770002 HCHG ROOM/CARE - OB PRIVATE (112)

## 2017-02-16 PROCEDURE — 302790 HCHG STAT ANTEPARTUM CARE, DAILY

## 2017-02-16 PROCEDURE — 59025 FETAL NON-STRESS TEST: CPT | Performed by: OBSTETRICS & GYNECOLOGY

## 2017-02-16 PROCEDURE — A9270 NON-COVERED ITEM OR SERVICE: HCPCS | Performed by: OBSTETRICS & GYNECOLOGY

## 2017-02-16 PROCEDURE — 700102 HCHG RX REV CODE 250 W/ 637 OVERRIDE(OP): Performed by: OBSTETRICS & GYNECOLOGY

## 2017-02-16 RX ADMIN — ERYTHROMYCIN 250 MG: 250 TABLET, DELAYED RELEASE ORAL at 08:42

## 2017-02-16 RX ADMIN — ERYTHROMYCIN 250 MG: 250 TABLET, DELAYED RELEASE ORAL at 21:06

## 2017-02-16 RX ADMIN — AMOXICILLIN 250 MG: 250 CAPSULE ORAL at 21:06

## 2017-02-16 RX ADMIN — ERYTHROMYCIN 250 MG: 250 TABLET, DELAYED RELEASE ORAL at 15:02

## 2017-02-16 RX ADMIN — AMOXICILLIN 250 MG: 250 CAPSULE ORAL at 06:02

## 2017-02-16 RX ADMIN — AMOXICILLIN 250 MG: 250 CAPSULE ORAL at 13:58

## 2017-02-16 ASSESSMENT — LIFESTYLE VARIABLES: DO YOU DRINK ALCOHOL: NO

## 2017-02-16 NOTE — PROGRESS NOTES
1900 received bedside report from HILTON Harris RN, assumed care. Pt resting comfortably in room, currently eating dinner with , reports positive fetal movement, denies any changes in leaking of fluid-color consistency or amount, denies feeling any contractions, POC discussed, all questions answered, pt verbalizes understanding.  1928 pt placed on EFM and TOCO to obtain nightly NST.  2030 pt off monitors, NST obtained, VSS. Pt resting comfortably in bed.  2100 nightly abx administered per MAR. pt denies needs at this time. Encouraged to call RN prn.  2219 RN at bedside for abx admin, see MAR-VSS, pt denies contractions, pt sleeping upon RN to room, encouraged to call prn.  0218 pt called RN stating that she just went to the restroom and noticed blood in the toilet upon wiping. RN viewed blood and it appeared as though there was some spotting of red blood in the toilet that dissipated into the toilet water itself, and pt noticed blood when she wiped. Pt placed on EFM and toco, pt denies feeling any uterine irritability, cramping or contractions, FHR is category 1 tracing, Dr. David called and updated on pt status, strip reviewed by MD, orders received to keep pt on monitor for one hour, if NST obtained then may d/c monitoring and follow up with Dr. Gardner in the morning. Pt education provided, all questions answered, pt verbalizes understanding and reports positive fetal movement, pt educated on need to notify RN if fetal movement decreases or changes and to update as needed on vaginal fluid/blood.  0610 pt went to restroom and noticed more blood in the toilet, EFM and TOCO reapplied, Dr. David notified  0625 Dr. David at bedside, viewed toilet, reviewed strip, pt to be NPO until Dr. Gardner evaluates pt this AM. All questions answered  0700 report given to day shift RN, assumed care.

## 2017-02-16 NOTE — CARE PLAN
Problem: Risk for Infection, Impaired Wound Healing  Goal: Remain free from signs and symptoms of infection  Outcome: PROGRESSING AS EXPECTED  Pt remains afebrile with no other s/s of infection.    Problem: Risk for injury  Goal: Patient and fetus will be free of preventable injury/complications  Outcome: PROGRESSING AS EXPECTED  Pt placed on EFM and TOCO to obtain nightly NST.

## 2017-02-16 NOTE — PROGRESS NOTES
Labor Progress Note  HOD 5    Dee Ribera VladimirClemente   IUP at 33.2/pprom      Subjective:  Pt with c/o bloody show x 2. Pt with painless vaginal bleeding twice. Pt without contractions, no fever or chills. Pt with BM once per day. No without dysuria.   Uterine contractions:no  Pain: No    Objective:   Filed Vitals:    02/15/17 2219 02/16/17 0600 02/16/17 0603 02/16/17 0704   BP: 105/60  113/57 110/59   Pulse: 103  104 98   Temp: 36.8 °C (98.2 °F) 36.9 °C (98.5 °F)  36.9 °C (98.4 °F)   TempSrc:       Resp:       Height:       Weight:         Fetal heart variability: 140's category 1 no decels  [place]: rare  SVE: 1/50/-3 no blood, pad dry.    Membranes ruptured: .yes    Meds:   Amoxicillin, erythromycin    Labs:  Recent Results (from the past 24 hour(s))   CBC WITH DIFFERENTIAL    Collection Time: 02/16/17  5:20 AM   Result Value Ref Range    WBC 12.1 (H) 4.8 - 10.8 K/uL    RBC 3.96 (L) 4.20 - 5.40 M/uL    Hemoglobin 12.8 12.0 - 16.0 g/dL    Hematocrit 38.4 37.0 - 47.0 %    MCV 97.0 81.4 - 97.8 fL    MCH 32.3 27.0 - 33.0 pg    MCHC 33.3 (L) 33.6 - 35.0 g/dL    RDW 45.2 35.9 - 50.0 fL    Platelet Count 204 164 - 446 K/uL    MPV 10.1 9.0 - 12.9 fL    Neutrophils-Polys 67.50 44.00 - 72.00 %    Lymphocytes 21.60 (L) 22.00 - 41.00 %    Monocytes 8.90 0.00 - 13.40 %    Eosinophils 0.70 0.00 - 6.90 %    Basophils 0.50 0.00 - 1.80 %    Immature Granulocytes 0.80 0.00 - 0.90 %    Nucleated RBC 0.00 /100 WBC    Neutrophils (Absolute) 8.16 (H) 2.00 - 7.15 K/uL    Lymphs (Absolute) 2.61 1.00 - 4.80 K/uL    Monos (Absolute) 1.07 (H) 0.00 - 0.85 K/uL    Eos (Absolute) 0.09 0.00 - 0.51 K/uL    Baso (Absolute) 0.06 0.00 - 0.12 K/uL    Immature Granulocytes (abs) 0.10 0.00 - 0.11 K/uL    NRBC (Absolute) 0.00 K/uL       Assessment:   IUP at 33.2/PPROM- pt with two episodes of painless, slight vaginal bleeding. No active bleeding now. Possibly secondary to cervical change vs abruption. Fetal status reassuring, will continue to  monitor. CPM.      Rachel Gardner

## 2017-02-17 PROCEDURE — A9270 NON-COVERED ITEM OR SERVICE: HCPCS | Performed by: OBSTETRICS & GYNECOLOGY

## 2017-02-17 PROCEDURE — 302790 HCHG STAT ANTEPARTUM CARE, DAILY

## 2017-02-17 PROCEDURE — 770002 HCHG ROOM/CARE - OB PRIVATE (112)

## 2017-02-17 PROCEDURE — 700102 HCHG RX REV CODE 250 W/ 637 OVERRIDE(OP): Performed by: OBSTETRICS & GYNECOLOGY

## 2017-02-17 PROCEDURE — 59025 FETAL NON-STRESS TEST: CPT | Performed by: OBSTETRICS & GYNECOLOGY

## 2017-02-17 RX ADMIN — ERYTHROMYCIN 250 MG: 250 TABLET, DELAYED RELEASE ORAL at 14:49

## 2017-02-17 RX ADMIN — ERYTHROMYCIN 250 MG: 250 TABLET, DELAYED RELEASE ORAL at 21:05

## 2017-02-17 RX ADMIN — AMOXICILLIN 250 MG: 250 CAPSULE ORAL at 21:53

## 2017-02-17 RX ADMIN — AMOXICILLIN 250 MG: 250 CAPSULE ORAL at 08:45

## 2017-02-17 RX ADMIN — AMOXICILLIN 250 MG: 250 CAPSULE ORAL at 13:31

## 2017-02-17 RX ADMIN — ERYTHROMYCIN 250 MG: 250 TABLET, DELAYED RELEASE ORAL at 08:45

## 2017-02-17 ASSESSMENT — COPD QUESTIONNAIRES
HAVE YOU SMOKED AT LEAST 100 CIGARETTES IN YOUR ENTIRE LIFE: NO/DON'T KNOW
COPD SCREENING SCORE: 0
DURING THE PAST 4 WEEKS HOW MUCH DID YOU FEEL SHORT OF BREATH: NONE/LITTLE OF THE TIME
DO YOU EVER COUGH UP ANY MUCUS OR PHLEGM?: NO/ONLY WITH OCCASIONAL COLDS OR INFECTIONS

## 2017-02-17 ASSESSMENT — PAIN SCALES - GENERAL
PAINLEVEL_OUTOF10: 0

## 2017-02-17 ASSESSMENT — LIFESTYLE VARIABLES
DO YOU DRINK ALCOHOL: NO
DO YOU DRINK ALCOHOL: NO

## 2017-02-17 NOTE — PROGRESS NOTES
0700- Bedside report received from LOLY Christina RN- poc discussed  0745- Dr. gardner in to see pt sve 1/50/-3, no blood on glove and light pink fluid out, no c/o pain, small amount of leaking, +FM, Dr. Gardner would like pt to be continous monitoring for a few hours and if no more bleeding then can take a shower and go back to NST q shift  0900- morning meds given  1045- pt has had no more active bleeding, just very light pink tinged fluid out, monitors off for pt to take a shower  1400- pt sleeping  1700- pt resting no complaints  1900-  Bedside report given to ANALIA Mathis RN- poc discussed

## 2017-02-17 NOTE — PROGRESS NOTES
1900 Bedside report received from ROSEANNA Ray RN, POC discussed. PT denies any needs at this time.     2100 RN at bedside, pt resting comfortably    0200 RN at bedside, pt sleeping    0700 Report given to ROSEANNA Ray RN, POC discussed

## 2017-02-17 NOTE — PROGRESS NOTES
0700- Bedside report received from ANALIA Mathis RN- poc discussed  0900- morning meds given, pt resting no c/o pain, bleeding, small amount of light pink tinged fluid out, +FM  1015- nst complete  1100- pt up to take a shower, linens changed  1400- pt resting no complaints   1800- pt sleeping  1900- Bedside report given to ADDISON Dozier RN- poc discussed

## 2017-02-17 NOTE — PROGRESS NOTES
IUP at 33 3/7 weeks  PPROM    S:  Pt reports more leaking.  Only one episode ov mucous tinged VB similar to yesterday. No abdominal pain or contractions. + FM.  Denies F/C    O:  T 98.6 P 109  BP 93/50  ABD:  Soft, gravid, NT  Ext: trace pedal edema, no cords or homans  Reactive NST.  [place] with iritablity    CBC from 12/16/17  WBC 12  H/H 12/38  Plts 204  GBS +    Amoxicillin, e-mycin      A/P:  IUP at 33 3/7 weeks,  PPROM - fair  1.  PPROM:  Continue PO Abx ad scheduled.  Will need Amp when goes into labor for GBS + status.  No evidence of chorio at this time.  WBC increased from 9.5 on admission to 12, but pt did receive steroids for FLM. Pt's pulse has been between  also since admission.   2.  FWB:  Reassuring

## 2017-02-18 PROCEDURE — 59025 FETAL NON-STRESS TEST: CPT | Performed by: OBSTETRICS & GYNECOLOGY

## 2017-02-18 PROCEDURE — A9270 NON-COVERED ITEM OR SERVICE: HCPCS | Performed by: OBSTETRICS & GYNECOLOGY

## 2017-02-18 PROCEDURE — 700102 HCHG RX REV CODE 250 W/ 637 OVERRIDE(OP): Performed by: OBSTETRICS & GYNECOLOGY

## 2017-02-18 PROCEDURE — 770002 HCHG ROOM/CARE - OB PRIVATE (112)

## 2017-02-18 PROCEDURE — 700112 HCHG RX REV CODE 229: Performed by: OBSTETRICS & GYNECOLOGY

## 2017-02-18 RX ADMIN — AMOXICILLIN 250 MG: 250 CAPSULE ORAL at 05:48

## 2017-02-18 RX ADMIN — DOCUSATE SODIUM 100 MG: 100 CAPSULE ORAL at 09:13

## 2017-02-18 RX ADMIN — ERYTHROMYCIN 250 MG: 250 TABLET, DELAYED RELEASE ORAL at 09:09

## 2017-02-18 ASSESSMENT — PAIN SCALES - GENERAL
PAINLEVEL_OUTOF10: 0

## 2017-02-18 ASSESSMENT — LIFESTYLE VARIABLES: DO YOU DRINK ALCOHOL: NO

## 2017-02-18 NOTE — PROGRESS NOTES
Labor Progress Note  HOD #7    Dee Schreiber   IUP at 33.4/pprom 2/11/17 at 1515      Subjective:  Pt without complaints. No further vaginal bleeding, no contractions, no f/c. Good fetal mvt. Pt with nml bm. No uti sx. Pt has completed 7 days of po antibiotics. Pt is tolerating regular diet.  Pain: No    Objective:   Filed Vitals:    02/17/17 2349 02/18/17 0348 02/18/17 0349 02/18/17 0750   BP: 111/58 88/52 98/56 107/64   Pulse: 99 88 88 100   Temp: 36.9 °C (98.5 °F)  36.4 °C (97.6 °F) 36.2 °C (97.2 °F)   TempSrc:       Resp: 16 16 16   Height:       Weight:         Fetal heart variability: 140's category 1, no decels   [place]: none  Abd: soft, NT, gravid uterus  Ext: no calf tenderness  Membranes ruptured: .yes, 2/11/17    Meds:   Pnv, colace,   Labs:  No results found for this or any previous visit (from the past 24 hour(s)).    Recent Labs      02/16/17   0520   WBC  12.1*   RBC  3.96*   HEMOGLOBIN  12.8   HEMATOCRIT  38.4   MCV  97.0   MCH  32.3   RDW  45.2   PLATELETCT  204   MPV  10.1   NEUTSPOLYS  67.50   LYMPHOCYTES  21.60*   MONOCYTES  8.90   EOSINOPHILS  0.70   BASOPHILS  0.50       Assessment:   IUP at 33.4/pprom- pt has completed 7 day course of po antibiotics. No evidence of PTL or choriaoamnitis. Plan is to induce between 35-36 weeks unless pt goes into labor of develops signs or sx of abruption or chorio. Pt is hemodynamically stable.  GBBS positive- will need ampicillin once pt is induced or goes into labor  Fetal status- reassuring.      Rachel Gardner

## 2017-02-18 NOTE — CARE PLAN
Problem: Powerlessness  Goal: Patient will express individual needs/desires  Outcome: PROGRESSING AS EXPECTED  Patient given choices when possible, grouping care so she can rest.

## 2017-02-18 NOTE — PROGRESS NOTES
"1900--Report received and plan of care discussed.  GA = 33.3 wks.  Patient states she is feeling well, feels the baby moving, states she feels an \"occasional\" UC.  Still leaking small amount of pink tinged amniotic fluid, no foul odor, no uterine tenderness, patient afebrile.   2200--Patient settled for sleeping.  No change in patient status.  0000--Patient says she is sleeping well, denies feeling painful or frequent UC's, slightly pink tinged amniotic fluid in small amounts on dafne pads, no foul odor noticed.  Patient denies uterine tenderness, remains afebrile.  States baby is moving.  0400--Patient remains the same.  Sleeping soundly @ this time.  0600--States she feels well, denies feeling any pain, denies UC's, denies uterine tenderness, states baby moving normally.  Remains afebrile.  0700--Report to oncoming RN and plan of care discussed.  "

## 2017-02-18 NOTE — CARE PLAN
Problem: Risk for Infection, Impaired Wound Healing  Goal: Remain free from signs and symptoms of infection  Outcome: PROGRESSING AS EXPECTED  Patient remains afebrile, denies uterine tenderness, no other signs of infection @ this time.

## 2017-02-19 PROCEDURE — 302790 HCHG STAT ANTEPARTUM CARE, DAILY

## 2017-02-19 PROCEDURE — 59025 FETAL NON-STRESS TEST: CPT | Performed by: OBSTETRICS & GYNECOLOGY

## 2017-02-19 PROCEDURE — 770002 HCHG ROOM/CARE - OB PRIVATE (112)

## 2017-02-19 ASSESSMENT — PAIN SCALES - GENERAL
PAINLEVEL_OUTOF10: 0
PAINLEVEL_OUTOF10: 0

## 2017-02-19 ASSESSMENT — COPD QUESTIONNAIRES
DURING THE PAST 4 WEEKS HOW MUCH DID YOU FEEL SHORT OF BREATH: NONE/LITTLE OF THE TIME
DO YOU EVER COUGH UP ANY MUCUS OR PHLEGM?: NO/ONLY WITH OCCASIONAL COLDS OR INFECTIONS
HAVE YOU SMOKED AT LEAST 100 CIGARETTES IN YOUR ENTIRE LIFE: NO/DON'T KNOW

## 2017-02-19 ASSESSMENT — LIFESTYLE VARIABLES: DO YOU DRINK ALCOHOL: NO

## 2017-02-19 NOTE — PROGRESS NOTES
Labor Progress Note  HOD #8    Dee MoralesBenjieClemente   IUP at 33.5/PPROM 2/11/17      Subjective:  Pt with continued lof, no vb, no contractions. Pt without f/c. Pt with nml BM once per day. Pt went for wheelchair ride yesterday and is feeling better. Pt without c/o.    Uterine contractions:no  Pain: No    Objective:   Filed Vitals:    02/18/17 1500 02/18/17 1508 02/18/17 1943 02/18/17 2356   BP:  107/60 120/57 97/54   Pulse:  95 93 96   Temp: 37.1 °C (98.7 °F)  36.6 °C (97.9 °F) 36.5 °C (97.7 °F)   TempSrc:       Resp:   16 16   Height:       Weight:         Gen: NAD  Abd: soft, gravid, NT  Fetal heart variability: 140's category 1 no decels  [place]: none   Ext: no calf tenderness  Membranes ruptured: .yes, 2/11/17    VTX    Meds:   PNV, colace  Labs:  No results found for this or any previous visit (from the past 24 hour(s)).      Assessment:   IUP at 33.5/PPROM-pt afebrile, no evidence of PTL or chorio. Pt s/p Betamethasone steroids upon admission. Pt has completed 7 days of antibiotics. CPM. Plan IOL at 35-36 weeks unles pt has signs or symptoms of PTL or chorio or abruption. Pt is stable at this time  GBBS positive- will need ampicillin when pt goes into labor  Fetal status- reassuring      Rachel Gardner

## 2017-02-19 NOTE — CARE PLAN
Problem: Risk for Infection, Impaired Wound Healing  Goal: Remain free from signs and symptoms of infection  Outcome: PROGRESSING AS EXPECTED  Patient remains afebrile, no foul smell from amniotic fluid.  No uterine tenderness.

## 2017-02-19 NOTE — CARE PLAN
Problem: Skin Integrity  Goal: Skin Integrity is maintained or improved  Outcome: PROGRESSING AS EXPECTED  Patient moving around in bed and up to bathroom.  No skin breakdowns noted.  Doing well.

## 2017-02-19 NOTE — PROGRESS NOTES
07  Report from NOC RN in room with pt, pt resting and RN will return later for assessment.  0900  Pt has family visiting and RN will return later.  1000  Linens changed while pt up in bathroom for her shower.  1125  Monitors placed at this time for HT's, pt has no report of any changes to her leaking.  Pt reports positive fetal movement. 1210  Monitors removed and pt has no further questions at this time. 1850  Report to NOC RN in room with pt, pt has no new complaints and desires to be placed on the monitor for HT's.  Monitors placed at this time.

## 2017-02-19 NOTE — CARE PLAN
Problem: Pain  Goal: Alleviation of Pain or a reduction in pain to the patient’s comfort goal  Intervention: Initial pain assessment  Pt does not have pain and she will notify RN if this changes.

## 2017-02-19 NOTE — CARE PLAN
Problem: Knowledge Deficit  Goal: Patient/Support Person demonstrates understanding regarding condition, prognosis and treatment needs during the pregnancy  Intervention: Learning assessment and teaching  POC discussed and pt has no further questions at this time.

## 2017-02-19 NOTE — PROGRESS NOTES
26 y.o.  EDC 17 EGA 33.4 in room 229 with FOB Jb Admit day 7 here for PPROM 17 @ 1515, clear. FOB Jb at bedside, Expecting a baby boy. NST q shift, reactive today. Per Dr. Gardner pt may have daily w/c ride.     1000 Saline flushed SL, pt reports pain and leaking form insertion site. IV infiltrated and dc'd. Pt stable. Denies UC's, reactive NST.   Report to Dr. Gardner, no need to restart IV at current time since pt is stable.   POC will be to IOL 35-36 weeks.

## 2017-02-19 NOTE — PROGRESS NOTES
1900--Report received and plan of care discussed.  GA = 33.4 wks.  Patient states she is feeling well, denies feeling painful or frequent UC's, states baby has been moving.  States baby has been moving.  Still having small amounts of clear, slightly pink tinged fluid, but no active vaginal bleeding.  Patient remains afebrile.  0000--Patient settled for sleep.  Remains the same.  States baby has been moving, denies painful or frequent UC's, denies vaginal bleeding and leaking only small amount pink tinged amniotic fluid with no foul smell.  Remains afebrile.   0400--Patient sleeping soundly @ this time.    0600--Patient  Says she slept well, denies feeling painful or frequent UC's, States she has leaked a lot of clear amniotic fluid tonight, denies vaginal bleeding.  No foul odor from amniotic fluid.  States baby has been moving well.  Patient remains afebrile.  No other problems @ this time.  0700--Report to oncoming RN and plan of care discussed.

## 2017-02-20 PROCEDURE — 700102 HCHG RX REV CODE 250 W/ 637 OVERRIDE(OP): Performed by: OBSTETRICS & GYNECOLOGY

## 2017-02-20 PROCEDURE — A9270 NON-COVERED ITEM OR SERVICE: HCPCS | Performed by: OBSTETRICS & GYNECOLOGY

## 2017-02-20 PROCEDURE — 770002 HCHG ROOM/CARE - OB PRIVATE (112)

## 2017-02-20 PROCEDURE — 59025 FETAL NON-STRESS TEST: CPT | Performed by: OBSTETRICS & GYNECOLOGY

## 2017-02-20 PROCEDURE — 302790 HCHG STAT ANTEPARTUM CARE, DAILY

## 2017-02-20 PROCEDURE — 700112 HCHG RX REV CODE 229: Performed by: OBSTETRICS & GYNECOLOGY

## 2017-02-20 RX ORDER — VITAMIN A ACETATE, BETA CAROTENE, ASCORBIC ACID, CHOLECALCIFEROL, .ALPHA.-TOCOPHEROL ACETATE, DL-, THIAMINE MONONITRATE, RIBOFLAVIN, NIACINAMIDE, PYRIDOXINE HYDROCHLORIDE, FOLIC ACID, CYANOCOBALAMIN, CALCIUM CARBONATE, FERROUS FUMARATE, ZINC OXIDE, CUPRIC OXIDE 3080; 12; 120; 400; 1; 1.84; 3; 20; 22; 920; 25; 200; 27; 10; 2 [IU]/1; UG/1; MG/1; [IU]/1; MG/1; MG/1; MG/1; MG/1; MG/1; [IU]/1; MG/1; MG/1; MG/1; MG/1; MG/1
1 TABLET, FILM COATED ORAL EVERY MORNING
Status: DISCONTINUED | OUTPATIENT
Start: 2017-02-20 | End: 2017-02-22

## 2017-02-20 RX ADMIN — DOCUSATE SODIUM 100 MG: 100 CAPSULE ORAL at 08:47

## 2017-02-20 RX ADMIN — Medication 1 TABLET: at 08:47

## 2017-02-20 ASSESSMENT — PAIN SCALES - GENERAL
PAINLEVEL_OUTOF10: 0

## 2017-02-20 ASSESSMENT — LIFESTYLE VARIABLES: DO YOU DRINK ALCOHOL: NO

## 2017-02-20 NOTE — PROGRESS NOTES
1600: Assumed care of pt, report received. Bedside report received from ANGEL Pineda. VSS. Prenatals reviewed. Pt denies UC's. Call light in reach. Family at bedside for support.

## 2017-02-20 NOTE — PROGRESS NOTES
33 6/7 wks, PPROM x 9 days, GBS positive.    PO antibiotics completed 2 days ago (amoxicillin, erythromycin).  Cephalic presentation when last evaluated 3 days ago.    No S/S of labor, good FMCs, still leaking clear AF    PE:  Ut. NT, calves NT, John's negative    A/P:      IUP at 33 6/7 weks, PPROM,  pt has completed 7 day course of po antibiotics. No evidence of PTL or choriaoamnitis. Plan is to induce between 35-36 weeks unless pt goes into labor of develops signs or sx of abruption or chorio.  GBBS positive- will need ampicillin once pt is induced or goes into labor  Fetal status- reassuring.

## 2017-02-20 NOTE — PROGRESS NOTES
1900- Received report from ELIZA Hdz RN. Patient denies any UCs, +FM. Some leaking today. Clear fluid, no odor. Patient has no needs at this time.    0120- RN to bedside. Patient states she felt a couple UCs but denies any pain. Patient educated to notify RN if they become more frequent or more noticeable.    0545- Patient denies any UCs. Patient back to sleep.

## 2017-02-21 LAB
ABO GROUP BLD: NORMAL
BASOPHILS # BLD AUTO: 0.3 % (ref 0–1.8)
BASOPHILS # BLD: 0.04 K/UL (ref 0–0.12)
BLD GP AB SCN SERPL QL: NORMAL
CREAT SERPL-MCNC: 0.47 MG/DL (ref 0.5–1.4)
EOSINOPHIL # BLD AUTO: 0.05 K/UL (ref 0–0.51)
EOSINOPHIL NFR BLD: 0.4 % (ref 0–6.9)
ERYTHROCYTE [DISTWIDTH] IN BLOOD BY AUTOMATED COUNT: 43.2 FL (ref 35.9–50)
ERYTHROCYTE [DISTWIDTH] IN BLOOD BY AUTOMATED COUNT: 43.3 FL (ref 35.9–50)
GFR SERPL CREATININE-BSD FRML MDRD: >60 ML/MIN/1.73 M 2
GRAM STN SPEC: NORMAL
HCT VFR BLD AUTO: 35.2 % (ref 37–47)
HCT VFR BLD AUTO: 39.3 % (ref 37–47)
HGB BLD-MCNC: 12.1 G/DL (ref 12–16)
HGB BLD-MCNC: 13.3 G/DL (ref 12–16)
IMM GRANULOCYTES # BLD AUTO: 0.1 K/UL (ref 0–0.11)
IMM GRANULOCYTES NFR BLD AUTO: 0.7 % (ref 0–0.9)
LYMPHOCYTES # BLD AUTO: 1.91 K/UL (ref 1–4.8)
LYMPHOCYTES NFR BLD: 14 % (ref 22–41)
MCH RBC QN AUTO: 31.6 PG (ref 27–33)
MCH RBC QN AUTO: 32 PG (ref 27–33)
MCHC RBC AUTO-ENTMCNC: 33.8 G/DL (ref 33.6–35)
MCHC RBC AUTO-ENTMCNC: 34.4 G/DL (ref 33.6–35)
MCV RBC AUTO: 93.1 FL (ref 81.4–97.8)
MCV RBC AUTO: 93.3 FL (ref 81.4–97.8)
MONOCYTES # BLD AUTO: 1.06 K/UL (ref 0–0.85)
MONOCYTES NFR BLD AUTO: 7.7 % (ref 0–13.4)
NEUTROPHILS # BLD AUTO: 10.52 K/UL (ref 2–7.15)
NEUTROPHILS NFR BLD: 76.9 % (ref 44–72)
NRBC # BLD AUTO: 0 K/UL
NRBC BLD AUTO-RTO: 0 /100 WBC
PLATELET # BLD AUTO: 212 K/UL (ref 164–446)
PLATELET # BLD AUTO: 225 K/UL (ref 164–446)
PMV BLD AUTO: 10.1 FL (ref 9–12.9)
PMV BLD AUTO: 10.4 FL (ref 9–12.9)
RBC # BLD AUTO: 3.78 M/UL (ref 4.2–5.4)
RBC # BLD AUTO: 4.21 M/UL (ref 4.2–5.4)
RH BLD: NORMAL
SIGNIFICANT IND 70042: NORMAL
SITE SITE: NORMAL
SOURCE SOURCE: NORMAL
WBC # BLD AUTO: 13.7 K/UL (ref 4.8–10.8)
WBC # BLD AUTO: 18.6 K/UL (ref 4.8–10.8)

## 2017-02-21 PROCEDURE — 700102 HCHG RX REV CODE 250 W/ 637 OVERRIDE(OP)

## 2017-02-21 PROCEDURE — 87205 SMEAR GRAM STAIN: CPT

## 2017-02-21 PROCEDURE — 3E0E77Z INTRODUCTION OF ELECTROLYTIC AND WATER BALANCE SUBSTANCE INTO PRODUCTS OF CONCEPTION, VIA NATURAL OR ARTIFICIAL OPENING: ICD-10-PCS | Performed by: OBSTETRICS & GYNECOLOGY

## 2017-02-21 PROCEDURE — A9270 NON-COVERED ITEM OR SERVICE: HCPCS | Performed by: OBSTETRICS & GYNECOLOGY

## 2017-02-21 PROCEDURE — 304965 HCHG RECOVERY SERVICES

## 2017-02-21 PROCEDURE — 86900 BLOOD TYPING SEROLOGIC ABO: CPT

## 2017-02-21 PROCEDURE — 88307 TISSUE EXAM BY PATHOLOGIST: CPT

## 2017-02-21 PROCEDURE — 36415 COLL VENOUS BLD VENIPUNCTURE: CPT

## 2017-02-21 PROCEDURE — 700105 HCHG RX REV CODE 258: Performed by: OBSTETRICS & GYNECOLOGY

## 2017-02-21 PROCEDURE — 86901 BLOOD TYPING SEROLOGIC RH(D): CPT

## 2017-02-21 PROCEDURE — 700102 HCHG RX REV CODE 250 W/ 637 OVERRIDE(OP): Performed by: OBSTETRICS & GYNECOLOGY

## 2017-02-21 PROCEDURE — 700111 HCHG RX REV CODE 636 W/ 250 OVERRIDE (IP): Performed by: PHARMACIST

## 2017-02-21 PROCEDURE — 87186 SC STD MICRODIL/AGAR DIL: CPT

## 2017-02-21 PROCEDURE — 0HQ9XZZ REPAIR PERINEUM SKIN, EXTERNAL APPROACH: ICD-10-PCS | Performed by: OBSTETRICS & GYNECOLOGY

## 2017-02-21 PROCEDURE — 85027 COMPLETE CBC AUTOMATED: CPT

## 2017-02-21 PROCEDURE — 3E0R3CZ INTRODUCTION OF REGIONAL ANESTHETIC INTO SPINAL CANAL, PERCUTANEOUS APPROACH: ICD-10-PCS | Performed by: ANESTHESIOLOGY

## 2017-02-21 PROCEDURE — 82565 ASSAY OF CREATININE: CPT

## 2017-02-21 PROCEDURE — 700105 HCHG RX REV CODE 258: Performed by: PHARMACIST

## 2017-02-21 PROCEDURE — 85025 COMPLETE CBC W/AUTO DIFF WBC: CPT

## 2017-02-21 PROCEDURE — 770002 HCHG ROOM/CARE - OB PRIVATE (112)

## 2017-02-21 PROCEDURE — A9270 NON-COVERED ITEM OR SERVICE: HCPCS

## 2017-02-21 PROCEDURE — 700111 HCHG RX REV CODE 636 W/ 250 OVERRIDE (IP): Performed by: OBSTETRICS & GYNECOLOGY

## 2017-02-21 PROCEDURE — 87077 CULTURE AEROBIC IDENTIFY: CPT

## 2017-02-21 PROCEDURE — 87070 CULTURE OTHR SPECIMN AEROBIC: CPT

## 2017-02-21 PROCEDURE — 00HU33Z INSERTION OF INFUSION DEVICE INTO SPINAL CANAL, PERCUTANEOUS APPROACH: ICD-10-PCS | Performed by: ANESTHESIOLOGY

## 2017-02-21 PROCEDURE — 59409 OBSTETRICAL CARE: CPT

## 2017-02-21 PROCEDURE — 700112 HCHG RX REV CODE 229: Performed by: OBSTETRICS & GYNECOLOGY

## 2017-02-21 PROCEDURE — 86850 RBC ANTIBODY SCREEN: CPT

## 2017-02-21 PROCEDURE — 700111 HCHG RX REV CODE 636 W/ 250 OVERRIDE (IP)

## 2017-02-21 RX ORDER — AMPICILLIN 2 G/1
2 INJECTION, POWDER, FOR SOLUTION INTRAVENOUS EVERY 6 HOURS
Status: DISCONTINUED | OUTPATIENT
Start: 2017-02-21 | End: 2017-02-21

## 2017-02-21 RX ORDER — SODIUM CHLORIDE, SODIUM LACTATE, POTASSIUM CHLORIDE, CALCIUM CHLORIDE 600; 310; 30; 20 MG/100ML; MG/100ML; MG/100ML; MG/100ML
INJECTION, SOLUTION INTRAVENOUS
Status: COMPLETED
Start: 2017-02-21 | End: 2017-02-21

## 2017-02-21 RX ORDER — ONDANSETRON 2 MG/ML
4 INJECTION INTRAMUSCULAR; INTRAVENOUS EVERY 6 HOURS PRN
Status: DISCONTINUED | OUTPATIENT
Start: 2017-02-21 | End: 2017-02-23 | Stop reason: HOSPADM

## 2017-02-21 RX ORDER — AMPICILLIN 2 G/1
2 INJECTION, POWDER, FOR SOLUTION INTRAVENOUS ONCE
Status: COMPLETED | OUTPATIENT
Start: 2017-02-21 | End: 2017-02-21

## 2017-02-21 RX ORDER — SODIUM CHLORIDE, SODIUM LACTATE, POTASSIUM CHLORIDE, CALCIUM CHLORIDE 600; 310; 30; 20 MG/100ML; MG/100ML; MG/100ML; MG/100ML
INJECTION, SOLUTION INTRAVENOUS PRN
Status: DISCONTINUED | OUTPATIENT
Start: 2017-02-21 | End: 2017-02-23 | Stop reason: HOSPADM

## 2017-02-21 RX ORDER — MISOPROSTOL 200 UG/1
800 TABLET ORAL
Status: DISCONTINUED | OUTPATIENT
Start: 2017-02-21 | End: 2017-02-21 | Stop reason: HOSPADM

## 2017-02-21 RX ORDER — METHYLERGONOVINE MALEATE 0.2 MG/ML
0.2 INJECTION INTRAVENOUS
Status: DISCONTINUED | OUTPATIENT
Start: 2017-02-21 | End: 2017-02-21 | Stop reason: HOSPADM

## 2017-02-21 RX ORDER — MISOPROSTOL 200 UG/1
600 TABLET ORAL
Status: DISCONTINUED | OUTPATIENT
Start: 2017-02-21 | End: 2017-02-23 | Stop reason: HOSPADM

## 2017-02-21 RX ORDER — AMPICILLIN 1 G/1
2 INJECTION, POWDER, FOR SOLUTION INTRAMUSCULAR; INTRAVENOUS EVERY 6 HOURS
Status: CANCELLED | OUTPATIENT
Start: 2017-02-21

## 2017-02-21 RX ORDER — CARBOPROST TROMETHAMINE 250 UG/ML
250 INJECTION, SOLUTION INTRAMUSCULAR
Status: DISCONTINUED | OUTPATIENT
Start: 2017-02-21 | End: 2017-02-23 | Stop reason: HOSPADM

## 2017-02-21 RX ORDER — BUPIVACAINE HYDROCHLORIDE 2.5 MG/ML
INJECTION, SOLUTION EPIDURAL; INFILTRATION; INTRACAUDAL
Status: ACTIVE
Start: 2017-02-21 | End: 2017-02-21

## 2017-02-21 RX ORDER — METHYLERGONOVINE MALEATE 0.2 MG/ML
0.2 INJECTION INTRAVENOUS
Status: DISCONTINUED | OUTPATIENT
Start: 2017-02-21 | End: 2017-02-23 | Stop reason: HOSPADM

## 2017-02-21 RX ORDER — MISOPROSTOL 200 UG/1
TABLET ORAL
Status: COMPLETED
Start: 2017-02-21 | End: 2017-02-21

## 2017-02-21 RX ORDER — VITAMIN A ACETATE, BETA CAROTENE, ASCORBIC ACID, CHOLECALCIFEROL, .ALPHA.-TOCOPHEROL ACETATE, DL-, THIAMINE MONONITRATE, RIBOFLAVIN, NIACINAMIDE, PYRIDOXINE HYDROCHLORIDE, FOLIC ACID, CYANOCOBALAMIN, CALCIUM CARBONATE, FERROUS FUMARATE, ZINC OXIDE, CUPRIC OXIDE 3080; 12; 120; 400; 1; 1.84; 3; 20; 22; 920; 25; 200; 27; 10; 2 [IU]/1; UG/1; MG/1; [IU]/1; MG/1; MG/1; MG/1; MG/1; MG/1; [IU]/1; MG/1; MG/1; MG/1; MG/1; MG/1
1 TABLET, FILM COATED ORAL EVERY MORNING
Status: DISCONTINUED | OUTPATIENT
Start: 2017-02-21 | End: 2017-02-23 | Stop reason: HOSPADM

## 2017-02-21 RX ORDER — AMPICILLIN 1 G/1
1 INJECTION, POWDER, FOR SOLUTION INTRAMUSCULAR; INTRAVENOUS EVERY 4 HOURS
Status: DISCONTINUED | OUTPATIENT
Start: 2017-02-21 | End: 2017-02-21

## 2017-02-21 RX ORDER — AMPICILLIN 1 G/1
2 INJECTION, POWDER, FOR SOLUTION INTRAMUSCULAR; INTRAVENOUS EVERY 6 HOURS
Status: DISCONTINUED | OUTPATIENT
Start: 2017-02-21 | End: 2017-02-21 | Stop reason: HOSPADM

## 2017-02-21 RX ORDER — AMPICILLIN 2 G/1
INJECTION, POWDER, FOR SOLUTION INTRAVENOUS
Status: COMPLETED
Start: 2017-02-21 | End: 2017-02-21

## 2017-02-21 RX ORDER — MAG HYDROX/ALUMINUM HYD/SIMETH 400-400-40
1 SUSPENSION, ORAL (FINAL DOSE FORM) ORAL
Status: DISCONTINUED | OUTPATIENT
Start: 2017-02-21 | End: 2017-02-23 | Stop reason: HOSPADM

## 2017-02-21 RX ORDER — OXYCODONE HYDROCHLORIDE AND ACETAMINOPHEN 5; 325 MG/1; MG/1
1 TABLET ORAL EVERY 4 HOURS PRN
Status: DISCONTINUED | OUTPATIENT
Start: 2017-02-21 | End: 2017-02-23 | Stop reason: HOSPADM

## 2017-02-21 RX ORDER — IBUPROFEN 600 MG/1
600 TABLET ORAL EVERY 6 HOURS PRN
Status: DISCONTINUED | OUTPATIENT
Start: 2017-02-21 | End: 2017-02-23 | Stop reason: HOSPADM

## 2017-02-21 RX ORDER — SODIUM CHLORIDE, SODIUM LACTATE, POTASSIUM CHLORIDE, CALCIUM CHLORIDE 600; 310; 30; 20 MG/100ML; MG/100ML; MG/100ML; MG/100ML
INJECTION, SOLUTION INTRAVENOUS CONTINUOUS
Status: DISPENSED | OUTPATIENT
Start: 2017-02-21 | End: 2017-02-21

## 2017-02-21 RX ORDER — AMPICILLIN 1 G/1
2 INJECTION, POWDER, FOR SOLUTION INTRAMUSCULAR; INTRAVENOUS EVERY 6 HOURS
Status: DISCONTINUED | OUTPATIENT
Start: 2017-02-21 | End: 2017-02-21

## 2017-02-21 RX ORDER — ONDANSETRON 4 MG/1
4 TABLET, ORALLY DISINTEGRATING ORAL EVERY 6 HOURS PRN
Status: DISCONTINUED | OUTPATIENT
Start: 2017-02-21 | End: 2017-02-23 | Stop reason: HOSPADM

## 2017-02-21 RX ORDER — ROPIVACAINE HYDROCHLORIDE 2 MG/ML
INJECTION, SOLUTION EPIDURAL; INFILTRATION; PERINEURAL
Status: COMPLETED
Start: 2017-02-21 | End: 2017-02-21

## 2017-02-21 RX ORDER — OXYCODONE AND ACETAMINOPHEN 10; 325 MG/1; MG/1
1 TABLET ORAL EVERY 4 HOURS PRN
Status: DISCONTINUED | OUTPATIENT
Start: 2017-02-21 | End: 2017-02-23 | Stop reason: HOSPADM

## 2017-02-21 RX ORDER — DOCUSATE SODIUM 100 MG/1
100 CAPSULE, LIQUID FILLED ORAL 2 TIMES DAILY PRN
Status: DISCONTINUED | OUTPATIENT
Start: 2017-02-21 | End: 2017-02-21

## 2017-02-21 RX ADMIN — AMPICILLIN SODIUM 2 G: 1 INJECTION, POWDER, FOR SOLUTION INTRAMUSCULAR; INTRAVENOUS at 10:28

## 2017-02-21 RX ADMIN — SODIUM CHLORIDE, POTASSIUM CHLORIDE, SODIUM LACTATE AND CALCIUM CHLORIDE 1000 ML: 600; 310; 30; 20 INJECTION, SOLUTION INTRAVENOUS at 04:26

## 2017-02-21 RX ADMIN — Medication 2000 ML/HR: at 12:55

## 2017-02-21 RX ADMIN — AMPICILLIN SODIUM 2000 MG: 2 INJECTION, POWDER, FOR SOLUTION INTRAMUSCULAR; INTRAVENOUS at 17:00

## 2017-02-21 RX ADMIN — SODIUM CHLORIDE, POTASSIUM CHLORIDE, SODIUM LACTATE AND CALCIUM CHLORIDE 1000 ML: 600; 310; 30; 20 INJECTION, SOLUTION INTRAVENOUS at 08:00

## 2017-02-21 RX ADMIN — ROPIVACAINE HYDROCHLORIDE 200 MG: 2 INJECTION, SOLUTION EPIDURAL; INFILTRATION at 09:01

## 2017-02-21 RX ADMIN — OXYCODONE HYDROCHLORIDE AND ACETAMINOPHEN 1 TABLET: 5; 325 TABLET ORAL at 23:40

## 2017-02-21 RX ADMIN — GENTAMICIN SULFATE 422 MG: 40 INJECTION, SOLUTION INTRAMUSCULAR; INTRAVENOUS at 07:20

## 2017-02-21 RX ADMIN — IBUPROFEN 600 MG: 600 TABLET, FILM COATED ORAL at 20:33

## 2017-02-21 RX ADMIN — SODIUM CHLORIDE, POTASSIUM CHLORIDE, SODIUM LACTATE AND CALCIUM CHLORIDE 500 ML: 600; 310; 30; 20 INJECTION, SOLUTION INTRAVENOUS at 10:35

## 2017-02-21 RX ADMIN — SODIUM CHLORIDE, POTASSIUM CHLORIDE, SODIUM LACTATE AND CALCIUM CHLORIDE: 600; 310; 30; 20 INJECTION, SOLUTION INTRAVENOUS at 11:31

## 2017-02-21 RX ADMIN — MISOPROSTOL 1000 MCG: 200 TABLET ORAL at 13:28

## 2017-02-21 RX ADMIN — SODIUM CHLORIDE, POTASSIUM CHLORIDE, SODIUM LACTATE AND CALCIUM CHLORIDE: 600; 310; 30; 20 INJECTION, SOLUTION INTRAVENOUS at 23:40

## 2017-02-21 RX ADMIN — AMPICILLIN SODIUM 2 G: 2 INJECTION, POWDER, FOR SOLUTION INTRAMUSCULAR; INTRAVENOUS at 16:47

## 2017-02-21 RX ADMIN — AMPICILLIN SODIUM 2000 MG: 2 INJECTION, POWDER, FOR SOLUTION INTRAMUSCULAR; INTRAVENOUS at 23:14

## 2017-02-21 RX ADMIN — DOCUSATE SODIUM 100 MG: 100 CAPSULE ORAL at 20:33

## 2017-02-21 RX ADMIN — OXYCODONE HYDROCHLORIDE AND ACETAMINOPHEN 1 TABLET: 5; 325 TABLET ORAL at 14:17

## 2017-02-21 RX ADMIN — AMPICILLIN SODIUM 2 G: 2 INJECTION, POWDER, FOR SOLUTION INTRAMUSCULAR; INTRAVENOUS at 04:45

## 2017-02-21 RX ADMIN — IBUPROFEN 600 MG: 600 TABLET, FILM COATED ORAL at 14:17

## 2017-02-21 RX ADMIN — Medication 125 ML/HR: at 13:49

## 2017-02-21 ASSESSMENT — PAIN SCALES - GENERAL
PAINLEVEL_OUTOF10: 0
PAINLEVEL_OUTOF10: 4
PAINLEVEL_OUTOF10: 4
PAINLEVEL_OUTOF10: 6

## 2017-02-21 ASSESSMENT — LIFESTYLE VARIABLES
EVER_SMOKED: NEVER
ALCOHOL_USE: NO

## 2017-02-21 NOTE — PROGRESS NOTES
0700- Bedside report received from HOWIE Castillo RN- poc discussed  0855- epidural placed by Dr. Fernandes  0925- lopez placed sve 3-4/70/-2  1130- sve 6-7/90/-2, pt is very comfortable and not feeling anything, will let me know if she has pressure  1225- sve complete, Dr. Gardner called on her way  1240- pt moved to OR 3, delivery room setup, NICU and RT notified  1249- viable male infant apgars 7, 8  1320- pt moved back to room 212, baby down to NICU  1535- pt up to the bathroom no problems  1615- Bedside report given to Nanci RN- poc discussed

## 2017-02-21 NOTE — PROGRESS NOTES
LE  0700 Report received from NOC RN, POC discussed, assumed pt care. Pt in room 229 PPROM on 2/11/17. Steroids complete. MO IV. NST q shift. Continuous toco.     1045 EFM placed. 's. Moderate varibility. Accels present. 1108 Variable deceleration down to 120. Will keep baby on the monitor for extended monitoring.   1300 Category 1 FHT. EFM removed. Pt up to shower, linens changed.     Pt report increase leaking of fluid with yellow tint. RN visualized maxi pad.  No foul smell, will continue to monitor.   NO additional complaints.     1600 Report to Sarah Sandoval RN to assume Pt care.

## 2017-02-21 NOTE — CARE PLAN
Problem: Psychosocial needs  Goal: Anxiety reduction  Outcome: PROGRESSING AS EXPECTED  Patient being given choices in her daily routine.  All questions asked and answered.  Grouping care so she can sleep at night.

## 2017-02-21 NOTE — PROGRESS NOTES
S: pt with urge to push    O: vss, afebrile    FHT: 150's category 1 with variables with contractions with spontaneous return to baseline  [place]: q 3  SVE: c/c/+2  A/P IUP at 34 weeks/pprom/gbbs positive  Pt now complete, will expt . Pt afebrile but on amp and gent secondary to suspected early chorio.

## 2017-02-21 NOTE — PROGRESS NOTES
Labor Progress Note    Dee Ribera Abdoul   IUP at 34 weeks/pprom      Subjective:  Pt with painful contractions, now in labor room and [place]. Pt wants epidural. Pt without f/c.  Uterine contractions:yes  Pain: Yes. Severity: moderate    Objective:   Filed Vitals:    02/20/17 1604 02/20/17 1953 02/20/17 2348 02/21/17 0500   BP: 124/64 118/64 96/51 111/54   Pulse: 110 112 103 103   Temp: 36.4 °C (97.5 °F) 36.1 °C (97 °F) 37.1 °C (98.8 °F) 37.2 °C (98.9 °F)   TempSrc: Temporal      Resp:  16 16    Height:       Weight:         Fetal heart variability: 150's category 1 no decels  [place]: q 2  SVE: defer  Meds: amp/gent    Meds:   Epidural : .no  Pitocin: .no    Labs:  Recent Results (from the past 24 hour(s))   CBC WITH DIFFERENTIAL    Collection Time: 02/21/17  2:45 AM   Result Value Ref Range    WBC 13.7 (H) 4.8 - 10.8 K/uL    RBC 4.21 4.20 - 5.40 M/uL    Hemoglobin 13.3 12.0 - 16.0 g/dL    Hematocrit 39.3 37.0 - 47.0 %    MCV 93.3 81.4 - 97.8 fL    MCH 31.6 27.0 - 33.0 pg    MCHC 33.8 33.6 - 35.0 g/dL    RDW 43.3 35.9 - 50.0 fL    Platelet Count 225 164 - 446 K/uL    MPV 10.4 9.0 - 12.9 fL    Neutrophils-Polys 76.90 (H) 44.00 - 72.00 %    Lymphocytes 14.00 (L) 22.00 - 41.00 %    Monocytes 7.70 0.00 - 13.40 %    Eosinophils 0.40 0.00 - 6.90 %    Basophils 0.30 0.00 - 1.80 %    Immature Granulocytes 0.70 0.00 - 0.90 %    Nucleated RBC 0.00 /100 WBC    Neutrophils (Absolute) 10.52 (H) 2.00 - 7.15 K/uL    Lymphs (Absolute) 1.91 1.00 - 4.80 K/uL    Monos (Absolute) 1.06 (H) 0.00 - 0.85 K/uL    Eos (Absolute) 0.05 0.00 - 0.51 K/uL    Baso (Absolute) 0.04 0.00 - 0.12 K/uL    Immature Granulocytes (abs) 0.10 0.00 - 0.11 K/uL    NRBC (Absolute) 0.00 K/uL   COD (ADULT)    Collection Time: 02/21/17  2:45 AM   Result Value Ref Range    ABO Grouping Only O     Rh Grouping Only POS     Antibody Screen-Cod NEG    CREATININE    Collection Time: 02/21/17  6:37 AM   Result Value Ref Range    Creatinine 0.47 (L) 0.50 -  1.40 mg/dL   ESTIMATED GFR    Collection Time: 17  6:37 AM   Result Value Ref Range    GFR If African American >60 >60 mL/min/1.73 m 2    GFR If Non African American >60 >60 mL/min/1.73 m 2       Assessment:   IUP at 34 weeks/PPROM/labor- pt s/p Betamethasone -. Pt now in labor. Afebrile. Pt on amp and gent. expt . Will notify NICU  Fetal status- reassuring  GBBS positive- on amp and gent      Rachel Gardner

## 2017-02-21 NOTE — PROGRESS NOTES
"Pharmacy Kinetics 26 y.o. female on gentamicin day # 1 2017    Dosing Weight: 84.5 kg (actual body weight = 109.77 kg, ideal body weight = 59.3 kg)  Currently on Gentamicin 422 mg iv q24hr    Indication for treatment: Chorioamnionitis      Pertinent history per medical record: Admitted on 2017 for premature  rupture of membranes. Pt is currently 34 weeks pregnant and is GBS positive. Pt presented on 17 at 32 and 4/7 weeks with complaints of possible rupture of membranes. She is GBS positive and was started on ampicillin. Overnight, pt reports increase leaking of pink tinged amniotic fluid without foul odor. Gentamicin empirically initiated for chorioamnionitis.     Other antibiotics: Ampicillin 2g IV Q6 H    Allergies: Nkda     List concerns for renal function: obesity    Pertinent cultures to date:   17 Group B Strep by PCR: Positive    Recent Labs      17   0245   WBC  13.7*   NEUTSPOLYS  76.90*     No results for input(s): BUN, CREATININE, ALBUMIN in the last 72 hours.  No results for input(s): GENTTROUGH, GENTPEAK, GENTRANDOM in the last 72 hours.No intake or output data in the 24 hours ending 17 0637   Blood pressure 111/54, pulse 103, temperature 37.2 °C (98.9 °F), temperature source Temporal, resp. rate 16, height 1.676 m (5' 6\"), weight 109.77 kg (242 lb), unknown if currently breastfeeding. Temp (24hrs), Av.6 °C (97.9 °F), Min:36.1 °C (97 °F), Max:37.2 °C (98.9 °F)      A/P   1. Gentamicin dose change: not indicated  2. Next gentamicin level: in 2 to 3 days if therapy continues  3. Goal trough: undetectable  4. Comments: Some concern for accumulation. Will dose per protocol and check gentamicin trough in 2 to 3 days if therapy continues. Clinical pharmacist to f/u in AM.    Sravan Ellis PharmD      "

## 2017-02-21 NOTE — PROGRESS NOTES
34 0/7  wks, PPROM x 10 days, GBS positive.    Pt. Transferred to labor room because of painful contractions and suspicion of early-onset chorioamnionitis.  Afebrile, temp 97.3.  WBC 13.7.  FHR Cat II based on increased baseline to 160-170.  10 BPM accels elicited with fetal scalp stimulation, variability present, mild variable decels.  Cervix now 2-3 cm/70%/-2.  IUPC placed.    DX:      34 0/7 wks  PPROM 10 days  GBS pos.  Latent phase labor  Suspected chorioamnionitis    PLAN:  Disc. At length with pt.    1.  Deliver.  2.  Allow labor, augment gently with pitocin PRN if fetal condition is amenable to this.  3.  Avoid fetal scalp electrode.  4.  IV ampicillin 2 g Q6H, genatmycin dosed per pharmacist (creatinine 0.47) per UpToDate guidelines.  5.  O2 mask.            WBCResults for EMILE PALMER (MRN [id number]) as of 2/21/2017 06:55   Ref. Range 2/16/2017 05:20 2/21/2017 02:45   WBC Latest Ref Range: 4.8-10.8 K/uL 12.1 (H) 13.7 (H)   RBC Latest Ref Range: 4.20-5.40 M/uL 3.96 (L) 4.21   Hemoglobin Latest Ref Range: 12.0-16.0 g/dL 12.8 13.3   Hematocrit Latest Ref Range: 37.0-47.0 % 38.4 39.3   MCV Latest Ref Range: 81.4-97.8 fL 97.0 93.3   MCH Latest Ref Range: 27.0-33.0 pg 32.3 31.6   MCHC Latest Ref Range: 33.6-35.0 g/dL 33.3 (L) 33.8   RDW Latest Ref Range: 35.9-50.0 fL 45.2 43.3   Platelet Count Latest Ref Range: 164-446 K/uL 204 225     Results for EMILE PALMER (MRN [id number]) as of 2/21/2017 07:13   Ref. Range 2/21/2017 06:37   Creatinine Latest Ref Range: 0.50-1.40 mg/dL 0.47 (L)

## 2017-02-21 NOTE — PROGRESS NOTES
Labor Progress Note    Dee Ribera Abdoul   IUP at 34 week/pprom/labor/early chorio      Subjective:  Ft without f/c. Pt now comfortable with epidural.  Uterine contractions:yes  Pain: No    Objective:   Filed Vitals:    02/20/17 2348 02/21/17 0500 02/21/17 0706 02/21/17 0819   BP: 96/51 111/54 110/53 114/63   Pulse: 103 103 114 125   Temp: 37.1 °C (98.8 °F) 37.2 °C (98.9 °F) 36.8 °C (98.2 °F)    TempSrc:       Resp: 16  22    Height:       Weight:         Fetal heart variability: 140's category 1, early decels and occasional variable decels, non- repetitivemoderate  [place]: q 2  SVE: 3-4/75/-1 vtx      Membranes ruptured: .yes, PPROM 2/11/17    Meds:   Epidural : .yes  Pitocin: .no    Labs:  Recent Results (from the past 24 hour(s))   CBC WITH DIFFERENTIAL    Collection Time: 02/21/17  2:45 AM   Result Value Ref Range    WBC 13.7 (H) 4.8 - 10.8 K/uL    RBC 4.21 4.20 - 5.40 M/uL    Hemoglobin 13.3 12.0 - 16.0 g/dL    Hematocrit 39.3 37.0 - 47.0 %    MCV 93.3 81.4 - 97.8 fL    MCH 31.6 27.0 - 33.0 pg    MCHC 33.8 33.6 - 35.0 g/dL    RDW 43.3 35.9 - 50.0 fL    Platelet Count 225 164 - 446 K/uL    MPV 10.4 9.0 - 12.9 fL    Neutrophils-Polys 76.90 (H) 44.00 - 72.00 %    Lymphocytes 14.00 (L) 22.00 - 41.00 %    Monocytes 7.70 0.00 - 13.40 %    Eosinophils 0.40 0.00 - 6.90 %    Basophils 0.30 0.00 - 1.80 %    Immature Granulocytes 0.70 0.00 - 0.90 %    Nucleated RBC 0.00 /100 WBC    Neutrophils (Absolute) 10.52 (H) 2.00 - 7.15 K/uL    Lymphs (Absolute) 1.91 1.00 - 4.80 K/uL    Monos (Absolute) 1.06 (H) 0.00 - 0.85 K/uL    Eos (Absolute) 0.05 0.00 - 0.51 K/uL    Baso (Absolute) 0.04 0.00 - 0.12 K/uL    Immature Granulocytes (abs) 0.10 0.00 - 0.11 K/uL    NRBC (Absolute) 0.00 K/uL   COD (ADULT)    Collection Time: 02/21/17  2:45 AM   Result Value Ref Range    ABO Grouping Only O     Rh Grouping Only POS     Antibody Screen-Cod NEG    CREATININE    Collection Time: 02/21/17  6:37 AM   Result Value Ref Range     Creatinine 0.47 (L) 0.50 - 1.40 mg/dL   ESTIMATED GFR    Collection Time: 17  6:37 AM   Result Value Ref Range    GFR If African American >60 >60 mL/min/1.73 m 2    GFR If Non African American >60 >60 mL/min/1.73 m 2       Assessment:   IUP at 34weeks/pprom/labor/likely early chorio- pt afebrile but with maternal tachycardia. Pt on amp and gent. Pt comfortable with epidural. expt .  Fetal status- reassuring, start amnioinfusion 500cc x 1 secondary to variable decels.      Rachel Gardner

## 2017-02-21 NOTE — PROGRESS NOTES
1900--Report received and plan of care discussed.  GA = 33.6 wks.  Patient states she feels like she is coming down with a cold tonight.  No complaints of painful or frequent UC's, denies uterine tenderness, states baby has been moving.  States not having vaginal bleeding and small amounts of clear, pink tinged amniotic fluid on pads with no foul odor detected.  Patient remains afebrile.  2200--Patient settled for sleeping W this time.  States she is very tired.  0000--Patient states she no longer feels like she is coming down with a cold.  She thinks it was because she was so tired.  States baby is moving normally for this time of night.  Denies feeling UC's, denies uterine tenderness, denies vaginal bleeding and leaking small amounts of pink tinged amniotic fluid without foul odor.  0220--Patient called and states she is feeling sharp pains like period cramps and hasn't been feeling the baby moving.  Baby put on Vijay EFM.  [employer] readjusted.  Patient also states she went to the bathroom and her amniotic fluid had a couple spots of red blood on her dafne pad.  0255--IV started and LR @ 125ml/hr.  SVE done and essentially no change from a few days ago.    0300--Report to HOWIE Castillo and POC discussed.

## 2017-02-21 NOTE — DELIVERY NOTE
DATE OF SERVICE:  2017    ADMITTING DIAGNOSES:  1.  Intrauterine pregnancy at 34 weeks.  2.   premature rupture of membranes.  3.  Suspected early chorioamnionitis.  4.  Group B Streptococcus positive.  5.  Morbidly obese.    PROCEDURE:  1.  Admission on 2017 for PPROM.  2.  Amoxicillin and erythromycin.  3.  Betamethasone steroid series.  4.  Epidural.  5.  Normal spontaneous vaginal delivery at 34 weeks.    PROCEDURE:  This patient is a 26-year-old  2, now para 2 that was   admitted at 32 weeks and 4 days on 2017 with  premature rupture   of membranes at 32 weeks and 4 days.  She was admitted, given steroids, group   B strep culture was done.  She was started on amoxicillin and erythromycin.    The patient did not require any tocolytics.  She was afebrile without any   evidence of labor.  She was monitored with NSTs every shift and she remained   afebrile.  However, early this morning, the patient became tachycardic and   went into labor.  She changed her cervix to 2-3 cm dilated and at that time   because she has a history of GBS positive and because of maternal tachycardia,   now fetal tachycardia, was expected early onset chorioamnionitis; therefore,   the patient was started on both ampicillin 2 g IV q. 6 hours and gentamicin   per pharmacy protocol.  A CBC was also done and her white count went up to   13.7.  The patient went into labor spontaneously.  At 09:11 in the morning,   she was now 3-4 cm dilated, comfortable with an epidural.  She remained   afebrile, but was on ampicillin and gentamicin.  Fetal heart tones ranged   between 140s to 170s with a category 1 strip.  She did start having variable   decelerations and she was amnio infused 500 mL x1.  By 12:45, the patient had   the urge to push and she was complete-complete and +2 station.  Because she   was 34 weeks pregnant, she was taken to the operating room.  NICU and   respiratory therapy were called and they  were present at the time of delivery.    At that time in the operating room, she was prepped and draped in the usual   sterile fashion.  She pushed twice and then I assisted with a normal   spontaneous vaginal delivery of a vigorous male in BELEN position.  The head was   delivered atraumatically.  A tight nuchal cord x1 was noted and the infant   was delivered through that.  At this time, the infant was placed on maternal   abdomen, and delayed cord clamping was performed for 40 seconds and then the   cord was doubly clamped and cut.  Mouth and nose had been bulb suctioned.    Cord gases were clamped and sent.  The placenta was delivered atraumatically   and intact and it was sent for culture.  Three-vessel cord was noted.  There   was a first-degree midline laceration that was repaired with 2-0 Vicryl on a   CT1 needle without any problems.  The uterus was firm and hemostatic.  Cytotec   1000 mcg were placed per rectum.  The  was stable and will be   transferred to the NICU.  Mother is stable, will continue with the ampicillin   and gentamicin x24 hours secondary to the suspected early chorioamnionitis.       ____________________________________     MD SLIME ARREGUIN / CARLOS    DD:  2017 13:17:25  DT:  2017 15:23:36    D#:  [id number]  Job#:  [id number]    cc: MONIKA PRATHER MD

## 2017-02-21 NOTE — PROGRESS NOTES
0300- report received from ADDISON Dozier RN. Accepted care of pt. PIV established, labs drawn. SVE 1/60/-3. UC every 2-3 min, pt coping.   0415- pt transferred to labor room.   0630- spoke with Dr. David via phone, updated MD on pt's status and elevated FHR. Orders received.   0650- Dr. David at bedside, SVE 2/70/-2, [facility].   0700- report to Merary KENT.

## 2017-02-21 NOTE — PROGRESS NOTES
Delivery note     in OR secondary to iup at 34 weeks, NICU and RT at delivery  , male, BELEN,with tight nuchal cord x 1, infant delivered atraumatically and placed on maternal abd. Delayed cord clamp x 40seconds and then cord doubly clamped and cut. Cord gases sent, placenta sent for culture. Placenta delivered intact, 3vc. 1st degree midline laceration repaired with 2-o vicry ct-1. Firm fundus at end of procedure. Cytotec 1,000mcg per rectum placed.  to NICU. Mom stable. Continue with amp and gent x 24 hrs.

## 2017-02-22 PROCEDURE — 90471 IMMUNIZATION ADMIN: CPT

## 2017-02-22 PROCEDURE — 700112 HCHG RX REV CODE 229

## 2017-02-22 PROCEDURE — 700105 HCHG RX REV CODE 258: Performed by: OBSTETRICS & GYNECOLOGY

## 2017-02-22 PROCEDURE — 3E0234Z INTRODUCTION OF SERUM, TOXOID AND VACCINE INTO MUSCLE, PERCUTANEOUS APPROACH: ICD-10-PCS | Performed by: OBSTETRICS & GYNECOLOGY

## 2017-02-22 PROCEDURE — 700102 HCHG RX REV CODE 250 W/ 637 OVERRIDE(OP): Performed by: OBSTETRICS & GYNECOLOGY

## 2017-02-22 PROCEDURE — 700111 HCHG RX REV CODE 636 W/ 250 OVERRIDE (IP): Performed by: PHARMACIST

## 2017-02-22 PROCEDURE — 700105 HCHG RX REV CODE 258: Performed by: PHARMACIST

## 2017-02-22 PROCEDURE — 90715 TDAP VACCINE 7 YRS/> IM: CPT

## 2017-02-22 PROCEDURE — A9270 NON-COVERED ITEM OR SERVICE: HCPCS | Performed by: OBSTETRICS & GYNECOLOGY

## 2017-02-22 PROCEDURE — 700111 HCHG RX REV CODE 636 W/ 250 OVERRIDE (IP): Performed by: OBSTETRICS & GYNECOLOGY

## 2017-02-22 PROCEDURE — 770002 HCHG ROOM/CARE - OB PRIVATE (112)

## 2017-02-22 RX ADMIN — IBUPROFEN 600 MG: 600 TABLET, FILM COATED ORAL at 21:18

## 2017-02-22 RX ADMIN — IBUPROFEN 600 MG: 600 TABLET, FILM COATED ORAL at 11:06

## 2017-02-22 RX ADMIN — OXYCODONE HYDROCHLORIDE AND ACETAMINOPHEN 1 TABLET: 5; 325 TABLET ORAL at 04:38

## 2017-02-22 RX ADMIN — IBUPROFEN 600 MG: 600 TABLET, FILM COATED ORAL at 04:38

## 2017-02-22 RX ADMIN — Medication 1 TABLET: at 07:14

## 2017-02-22 RX ADMIN — GENTAMICIN SULFATE 422 MG: 40 INJECTION, SOLUTION INTRAMUSCULAR; INTRAVENOUS at 06:29

## 2017-02-22 RX ADMIN — OXYCODONE HYDROCHLORIDE AND ACETAMINOPHEN 1 TABLET: 5; 325 TABLET ORAL at 21:18

## 2017-02-22 RX ADMIN — OXYCODONE HYDROCHLORIDE AND ACETAMINOPHEN 1 TABLET: 5; 325 TABLET ORAL at 11:06

## 2017-02-22 RX ADMIN — AMPICILLIN SODIUM 2000 MG: 2 INJECTION, POWDER, FOR SOLUTION INTRAMUSCULAR; INTRAVENOUS at 11:06

## 2017-02-22 RX ADMIN — OXYCODONE HYDROCHLORIDE AND ACETAMINOPHEN 1 TABLET: 5; 325 TABLET ORAL at 16:07

## 2017-02-22 RX ADMIN — TETANUS TOXOID, REDUCED DIPHTHERIA TOXOID AND ACELLULAR PERTUSSIS VACCINE, ADSORBED 0.5 ML: 5; 2.5; 8; 8; 2.5 SUSPENSION INTRAMUSCULAR at 06:33

## 2017-02-22 RX ADMIN — AMPICILLIN SODIUM 2000 MG: 2 INJECTION, POWDER, FOR SOLUTION INTRAMUSCULAR; INTRAVENOUS at 04:42

## 2017-02-22 ASSESSMENT — PAIN SCALES - GENERAL
PAINLEVEL_OUTOF10: 5
PAINLEVEL_OUTOF10: 2
PAINLEVEL_OUTOF10: 1
PAINLEVEL_OUTOF10: 7
PAINLEVEL_OUTOF10: 1
PAINLEVEL_OUTOF10: 6
PAINLEVEL_OUTOF10: 3
PAINLEVEL_OUTOF10: 5

## 2017-02-22 NOTE — PROGRESS NOTES
Reviewed pumping and breast massage/hand expression with mother. Unable to express colostrum at this time, even after stimulation with pumping session. Encouragement and support provided. Will follow-up as needed. Placed on waiting list for [facility] breast pump at [facility], awaiting pump availability at this time.

## 2017-02-22 NOTE — PROGRESS NOTES
Bedside report received from Merary KENT @ 3457. Oriented patient to the room, introduced the call light & skylMemorop system. Discussed plan of care. Assessment done. She would like to call for pain medication. Breast pump started. Discharge/ patient instructions discussed, handout provided. Denies pain. Encouraged to call if with need. Will check at intervals.

## 2017-02-22 NOTE — PROGRESS NOTES
Progress Note    Dee Schreiber   PP day 1  Chief Admitting Dx: pregnancy  Labor and delivery, indication for care  Delivery Type: vaginal, spontaneous at 34 weeks with PPROM/early chorio      Subjective:  Pt without complaints. Pt without f/c. Pt is pumping breast milk. Baby in ICN and stable.  Ambulating: yes  Tolerating oral feed: yes  Flatus: yes    Voiding: yes  Dizziness: no  Filed Vitals:    02/21/17 2000 02/22/17 0000 02/22/17 0100 02/22/17 0400   BP: 101/61 89/57 94/62 90/58   Pulse: 110 98 92 93   Temp: 36.2 °C (97.2 °F) 36.7 °C (98.1 °F)  36.6 °C (97.9 °F)   TempSrc:       Resp: 20 19 19   Height:       Weight:       SpO2: 95% 90%  94%       Exam:  Abdomen: Abdomen soft, non-tender. BS normal. No masses,  No organomegaly  Fundus Tenderness: no  Below umbilicus: yes  Perineum: perineum intact  Extremities: 1+ edema  Lochia: mild    Labs:   Recent Results (from the past 24 hour(s))   GRAM STAIN    Collection Time: 02/21/17  1:00 PM   Result Value Ref Range    Significant Indicator .     Source WND     Site Placenta     Gram Stain Result Few WBCs.  No organisms seen.      CBC without differential    Collection Time: 02/21/17 10:06 PM   Result Value Ref Range    WBC 18.6 (H) 4.8 - 10.8 K/uL    RBC 3.78 (L) 4.20 - 5.40 M/uL    Hemoglobin 12.1 12.0 - 16.0 g/dL    Hematocrit 35.2 (L) 37.0 - 47.0 %    MCV 93.1 81.4 - 97.8 fL    MCH 32.0 27.0 - 33.0 pg    MCHC 34.4 33.6 - 35.0 g/dL    RDW 43.2 35.9 - 50.0 fL    Platelet Count 212 164 - 446 K/uL    MPV 10.1 9.0 - 12.9 fL       Assessment/plan:  Continue Routine postpartum care  D/C amp and gent at 24 hrs after delivery, about 1 pm. Pt afebrile.          Rachel Gardner M.D.

## 2017-02-22 NOTE — CARE PLAN
Problem: Alteration in comfort related to episiotomy, vaginal repair and/or after birth pains  Goal: Patient verbalizes acceptable pain level  Patient verbalizes acceptable pain level    Problem: Potential knowledge deficit related to lack of understanding of self and  care  Goal: Patient will demonstrate ability to care for self and infant  Patient demonstra

## 2017-02-22 NOTE — CARE PLAN
Problem: Infection  Goal: Will remain free from infection  Outcome: PROGRESSING AS EXPECTED  Patient's vital signs stable, no fever, running IV antibiotics, no S&S of infection noted at this time.     Problem: Fluid Volume:  Goal: Will maintain balanced intake and output  Outcome: PROGRESSING AS EXPECTED  Pt. Taking adequate PO fluids, voiding, and pumping q3h.     Problem: Pain Management  Goal: Pain level will decrease to patient’s comfort goal  Outcome: PROGRESSING AS EXPECTED  Patient would like to call for pain medications when she needs them.

## 2017-02-22 NOTE — CARE PLAN
Problem: Potential for postpartum infection related to presence of episiotomy/vaginal tear and/or uterine contamination  Goal: Patient will be absent from signs and symptoms of infection  Outcome: PROGRESSING AS EXPECTED  Encouraged ambulation.     Problem: Alteration in comfort related to episiotomy, vaginal repair and/or after birth pains  Goal: Patient is able to ambulate, care for self and infant  Outcome: PROGRESSING AS EXPECTED  Pain medication will be given as needed.

## 2017-02-23 VITALS
DIASTOLIC BLOOD PRESSURE: 64 MMHG | WEIGHT: 242 LBS | BODY MASS INDEX: 38.89 KG/M2 | SYSTOLIC BLOOD PRESSURE: 109 MMHG | RESPIRATION RATE: 18 BRPM | OXYGEN SATURATION: 96 % | TEMPERATURE: 97.4 F | HEART RATE: 90 BPM | HEIGHT: 66 IN

## 2017-02-23 LAB
BACTERIA WND AEROBE CULT: ABNORMAL
BACTERIA WND AEROBE CULT: ABNORMAL
BASOPHILS # BLD AUTO: 0.7 % (ref 0–1.8)
BASOPHILS # BLD: 0.05 K/UL (ref 0–0.12)
EOSINOPHIL # BLD AUTO: 0.1 K/UL (ref 0–0.51)
EOSINOPHIL NFR BLD: 1.4 % (ref 0–6.9)
ERYTHROCYTE [DISTWIDTH] IN BLOOD BY AUTOMATED COUNT: 43.8 FL (ref 35.9–50)
GRAM STN SPEC: ABNORMAL
HCT VFR BLD AUTO: 36.9 % (ref 37–47)
HGB BLD-MCNC: 12.7 G/DL (ref 12–16)
IMM GRANULOCYTES # BLD AUTO: 0.06 K/UL (ref 0–0.11)
IMM GRANULOCYTES NFR BLD AUTO: 0.9 % (ref 0–0.9)
LYMPHOCYTES # BLD AUTO: 1.24 K/UL (ref 1–4.8)
LYMPHOCYTES NFR BLD: 17.9 % (ref 22–41)
MCH RBC QN AUTO: 32.2 PG (ref 27–33)
MCHC RBC AUTO-ENTMCNC: 34.4 G/DL (ref 33.6–35)
MCV RBC AUTO: 93.4 FL (ref 81.4–97.8)
MONOCYTES # BLD AUTO: 0.71 K/UL (ref 0–0.85)
MONOCYTES NFR BLD AUTO: 10.2 % (ref 0–13.4)
NEUTROPHILS # BLD AUTO: 4.77 K/UL (ref 2–7.15)
NEUTROPHILS NFR BLD: 68.9 % (ref 44–72)
NRBC # BLD AUTO: 0 K/UL
NRBC BLD AUTO-RTO: 0 /100 WBC
PLATELET # BLD AUTO: 199 K/UL (ref 164–446)
PMV BLD AUTO: 10 FL (ref 9–12.9)
RBC # BLD AUTO: 3.95 M/UL (ref 4.2–5.4)
SIGNIFICANT IND 70042: ABNORMAL
SITE SITE: ABNORMAL
SOURCE SOURCE: ABNORMAL
WBC # BLD AUTO: 6.9 K/UL (ref 4.8–10.8)

## 2017-02-23 PROCEDURE — A9270 NON-COVERED ITEM OR SERVICE: HCPCS | Performed by: OBSTETRICS & GYNECOLOGY

## 2017-02-23 PROCEDURE — 700102 HCHG RX REV CODE 250 W/ 637 OVERRIDE(OP): Performed by: OBSTETRICS & GYNECOLOGY

## 2017-02-23 PROCEDURE — 85025 COMPLETE CBC W/AUTO DIFF WBC: CPT

## 2017-02-23 PROCEDURE — 36415 COLL VENOUS BLD VENIPUNCTURE: CPT

## 2017-02-23 PROCEDURE — 700112 HCHG RX REV CODE 229: Performed by: OBSTETRICS & GYNECOLOGY

## 2017-02-23 RX ORDER — IBUPROFEN 600 MG/1
600 TABLET ORAL EVERY 6 HOURS PRN
Qty: 30 TAB | Refills: 3 | Status: SHIPPED | OUTPATIENT
Start: 2017-02-23 | End: 2019-12-21

## 2017-02-23 RX ORDER — OXYCODONE HYDROCHLORIDE AND ACETAMINOPHEN 5; 325 MG/1; MG/1
1 TABLET ORAL EVERY 4 HOURS PRN
Qty: 15 TAB | Refills: 0 | Status: SHIPPED | OUTPATIENT
Start: 2017-02-23 | End: 2019-12-21

## 2017-02-23 RX ADMIN — DOCUSATE SODIUM 100 MG: 100 CAPSULE ORAL at 03:23

## 2017-02-23 RX ADMIN — IBUPROFEN 600 MG: 600 TABLET, FILM COATED ORAL at 03:23

## 2017-02-23 RX ADMIN — OXYCODONE HYDROCHLORIDE AND ACETAMINOPHEN 1 TABLET: 5; 325 TABLET ORAL at 03:23

## 2017-02-23 RX ADMIN — Medication 1 TABLET: at 07:58

## 2017-02-23 ASSESSMENT — PAIN SCALES - GENERAL
PAINLEVEL_OUTOF10: 0
PAINLEVEL_OUTOF10: 1
PAINLEVEL_OUTOF10: 5

## 2017-02-23 NOTE — DISCHARGE INSTRUCTIONS
POSTPARTUM DISCHARGE INSTRUCTIONS FOR MOM    YOB: 1990   Age: 26 y.o.               Admit Date: 2017     Discharge Date: 2017  Attending Doctor:  Rachel Gardner M.D.                  Allergies:  Nkda    Discharged to home by car. Discharged via walking, hospital escort: Yes.  Special equipment needed: Not Applicable  Belongings with: Personal  Be sure to schedule a follow-up appointment with your primary care doctor or any specialists as instructed.     Discharge Plan:   Diet Plan: Discussed  Activity Level: Discussed  Confirmed Follow up Appointment: Patient to Call and Schedule Appointment  Confirmed Symptoms Management: Discussed  Medication Reconciliation Updated: Yes  Influenza Vaccine Indication: Not indicated: Previously immunized this influenza season and > 8 years of age    REASONS TO CALL YOUR OBSTETRICIAN:  1.   Persistent fever or shaking chills (Temperature higher than 100.4)  2.   Heavy bleeding (soaking more than 1 pad per hour); Passing clots  3.   Foul odor from vagina  4.   Mastitis (Breast infection; breast pain, chills, fever, redness)  5.   Urinary pain, burning or frequency  6.   Episiotomy infection  7.   Abdominal incision infection  8.   Severe depression longer than 24 hours    HAND WASHING  · Prior to handling the baby.  · Before breastfeeding or bottle feeding baby.  · After using the bathroom or changing the baby's diaper.    WOUND CARE  Ask your physician for additional care instructions.  In general:    ·  Incision:      · Keep clean and dry.    · Do NOT lift anything heavier than your baby for up to 6 weeks.    · There should not be any opening or pus.      VAGINAL CARE  · Nothing inside vagina for 6 weeks: no sexual intercourse, tampons or douching.  · Bleeding may continue for 2-4 weeks.  Amount may vary.    · Call your physician for heavy bleeding which means soaking more than 1 pad per hour    BIRTH CONTROL  · It is possible to become pregnant  "at any time after delivery and while breastfeeding.  · Plan to discuss a method of birth control with your physician at your follow up visit. visit.    DIET AND ELIMINATION  · Eating more fiber (bran cereal, fruits, and vegetables) and drinking plenty of fluids will help to avoid constipation.  · Urinary frequency after childbirth is normal.    POSTPARTUM BLUES  During the first few days after birth, you may experience a sense of the \"blues\" which may include impatience, irritability or even crying.  These feeling come and go quickly.  However, as many as 1 in 10 women experience emotional symptoms known as postpartum depression.    Postpartum depression:  May start as early as the second or third day after delivery or take several weeks or months to develop.  Symptoms of \"blues\" are present, but are more intense:  Crying spells; loss of appetite; feelings of hopelessness or loss of control; fear of touching the baby; over concern or no concern at all about the baby; little or no concern about your own appearance/caring for yourself; and/or inability to sleep or excessive sleeping.  Contact your physician if you are experiencing any of these symptoms.    Crisis Hotline:  · [place] Crisis Hotline:  4-895-MPCTWND  Or 1-203.348.9176  · [place] Crisis Hotline:  1-995.256.1833  Or 529-856-6271    PREVENTING SHAKEN BABY:  If you are angry or stressed, PUT THE BABY IN THE CRIB, step away, take some deep breaths, and wait until you are calm to care for the baby.  DO NOT SHAKE THE BABY.  You are not alone, call a supporter for help.    · Crisis [facility] Center 24/7 crisis line 644-831-6591 or 1-765.502.1369  · You can also text them, text \"ANSWER\" to 938686    QUIT SMOKING/TOBACCO USE:  I understand the use of any tobacco products increases my chance of suffering from future heart disease and could cause other illnesses which may shorten my life. Quitting the use of tobacco products is the single most important thing I can do to " improve my health. For further information on smoking / tobacco cessation call a Toll Free Quit Line at 1-967.376.5165 (*[employer]) or 1-158.691.2826 (American [employer] Association) or you can access the web based program at www.lungusa.org.    · [place] Tobacco Users Help Line:  (800) 788-3170       Toll Free: 1-766.466.8992  · Quit Tobacco Program [facility] (284)674-7327    DEPRESSION / SUICIDE RISK:  As you are discharged from this [facility], it is important to learn how to keep safe from harming yourself.    Recognize the warning signs:  · Abrupt changes in personality, positive or negative- including increase in energy   · Giving away possessions  · Change in eating patterns- significant weight changes-  positive or negative  · Change in sleeping patterns- unable to sleep or sleeping all the time   · Unwillingness or inability to communicate  · Depression  · Unusual sadness, discouragement and loneliness  · Talk of wanting to die  · Neglect of personal appearance   · Rebelliousness- reckless behavior  · Withdrawal from people/activities they love  · Confusion- inability to concentrate     If you or a loved one observes any of these behaviors or has concerns about self-harm, here's what you can do:  · Talk about it- your feelings and reasons for harming yourself  · Remove any means that you might use to hurt yourself (examples: pills, rope, extension cords, firearm)  · Get professional help from the community (Mental Health, Substance Abuse, psychological counseling)  · Do not be alone:Call your Safe Contact- someone whom you trust who will be there for you.  · Call your local CRISIS HOTLINE 624-8562 or 321-009-1215  · Call your local Children's Mobile Crisis Response Team [place] (971) 253-2343 or www.jslyhl  · Call the toll free National Suicide Prevention Hotlines   · National Suicide Prevention Lifeline 119-781-TPJD (2109)  · National Hope Line  [facility] 800-SUICIDE (856-4217)    DISCHARGE SURVEY:  Thank you for choosing [facility].  We hope we provided you with very good care.  You may be receiving a survey in the mail.  Please fill it out.  Your opinion is valuable to us.    ADDITIONAL EDUCATIONAL MATERIALS GIVEN TO PATIENT:        My signature on this form indicates that:  1.  I have reviewed and understand the above information  2.  My questions regarding this information have been answered to my satisfaction.  3.  I have formulated a plan with my discharge nurse to obtain my prescribed medication for home.

## 2017-02-23 NOTE — PROGRESS NOTES
Mother with c/o nipples rubbing on inside of flanges when pumping, larger flanges provided and mother states comfort/no rubbing when pumping, verified understanding of proper pump use and settings, mother reports history of PCOS but did not require assistance becoming pregnant, only breast change reported during pregnancy was darkening of nipples/areolas, no colostrum noted with pumping at this time but mother reports being able to get a few drops of colostrum with HE, mother to pump Q 2-2.5 hours for 15 minutes and leave time for a 4-5 hour stretch of sleep at night, encouraged mother to add HE for 5-10 minutes after pumping, mother has [employer] Spectra pump for home and is first on the list for HG pump from [facility], encouraged to call for assistance as needed.

## 2017-02-23 NOTE — CARE PLAN
Problem: Altered physiologic condition related to immediate post-delivery state and potential for bleeding/hemorrhage  Goal: Patient physiologically stable as evidenced by normal lochia, palpable uterine involution and vital signs within normal limits  Outcome: PROGRESSING AS EXPECTED  Vital signs stable, fundus is firm and lochia is light.    Problem: Potential for postpartum infection related to presence of episiotomy/vaginal tear and/or uterine contamination  Goal: Patient will be absent from signs and symptoms of infection  Outcome: PROGRESSING AS EXPECTED  Patient is afebrile and free from s/s of infection.

## 2017-02-23 NOTE — CARE PLAN
Problem: Communication  Goal: The ability to communicate needs accurately and effectively will improve  Outcome: PROGRESSING AS EXPECTED  Patient ambulating to NICU, taking adequate PO fluids and voiding. Vitals stable.     Problem: Pain Management  Goal: Pain level will decrease to patient’s comfort goal  Outcome: PROGRESSING AS EXPECTED  Patient wants to keep PRN pain meds around the clock even when sleeping.

## 2017-02-23 NOTE — PROGRESS NOTES
Patient assessed, Vitals stable, fundus firm, bleeding within normal limits, all questions & concerns answered, Pt. Has call light within reach & working.   Patient pumping q3h.

## 2017-03-04 NOTE — DISCHARGE SUMMARY
ADMITTING DIAGNOSES:  1.  Intrauterine pregnancy at 34 weeks.  2.   premature rupture of membranes.  3.  Suspected early chorioamnionitis.  4.  Group B strep positive.  5.  Morbidly obese.    PROCEDURE:  1.  Admission on  for PPROM.  2.  Amoxicillin erythromycin.  3.  Betamethasone steroid series.  4.  Epidural.  5.  Normal spontaneous vaginal delivery at 34 weeks.  6.  Ampicillin and gentamicin for suspected chorea.  7.  Routine postpartum care.    HOSPITAL COURSE:  This is a 26-year-old  2, para 2,    female that was admitted at 32 weeks and 4 days on 2017 with    premature rupture of membranes.  At that time, she was admitted.  Group B   strep culture was done and she was started on betamethasone for fetal lung   maturity and was started on amoxicillin and erythromycin to increase her   latency.  Tocolysis was not started since she was not milton.  She was   afebrile without any evidence of chorea or labor.  She was getting NSTs and   the fetal status was reactive and reassuring.  During her hospitalization the   patient remained stable, afebrile with a reassuring NST.  She was having   normal bowel movements.  She was having good fetal movement.  She continued to   leak amniotic fluid, but the amniotic fluid was clear.  After 7 days of the   erythromycin and amoxicillin was discontinued.  The patient was getting NSTs   q. shift.  She was getting out of bed and taking rides on her wheel chair   outside.  The patient has started having occasional.  On , she   started having occasional spotting with contractions, but nonrepetitive and   then 2017 the patient went into labor.  She was transferred to a   laboring room.  She was having painful contractions in anticipation of early   chorioamnionitis.  She was afebrile with temperature of 97.3.  However, the   fetal heart rate when increased from baseline to 160s-170s.  Her cervix was   now  2-3 cm dilated, 70% effaced, -2 station and therefore she was started on   ampicillin 2 g IV every 6 hours and gentamicin per pharmacy protocol.  The   patient then underwent a normal spontaneous vaginal delivery of a viable male   infant.  He was transferred to the NICU and postpartum, the patient did great.    By postpartum day #1, patient was tolerating regular diet, voiding and   ambulating.  She remained afebrile.  Her physical exam was normal.  Her   postpartum white count was 18.6 and H and H was 12.1 and 35.2.  Her platelets   were 212.  The ampicillin and gentamicin were discontinued after 24 hours   after delivery.    DISPOSITION:  On postpartum day #1, patient was discharged home,   hemodynamically stable.  She was to continue with pelvic rest, no heavy   lifting.  She will see me back in 6 weeks for postpartum visit.  She was   discharged home on ibuprofen 600 mg 1 p.o. q. 6 hours p.r.n. for pain, Colace   100 mg 1 p.o. daily, and Percocet 5/325 one p.o. q. 4-6 hours p.r.n. for pain,   #15.       ____________________________________     MD SLIME ARREGUIN / CARLOS    DD:  03/03/2017 10:57:39  DT:  03/04/2017 03:35:11    D#:  [id number]  Job#:  [id number]

## 2018-01-05 ENCOUNTER — HOSPITAL ENCOUNTER (OUTPATIENT)
Dept: RADIOLOGY | Facility: MEDICAL CENTER | Age: 28
End: 2018-01-05
Attending: FAMILY MEDICINE
Payer: COMMERCIAL

## 2018-01-05 DIAGNOSIS — H81.4 VERTIGO OF CENTRAL ORIGIN, UNSPECIFIED LATERALITY: ICD-10-CM

## 2018-01-05 PROCEDURE — 70450 CT HEAD/BRAIN W/O DYE: CPT

## 2018-04-02 ENCOUNTER — HOSPITAL ENCOUNTER (OUTPATIENT)
Dept: RADIOLOGY | Facility: MEDICAL CENTER | Age: 28
End: 2018-04-02
Attending: NURSE PRACTITIONER
Payer: COMMERCIAL

## 2018-04-02 DIAGNOSIS — R22.42 MASS OF LOWER LEG, LEFT: ICD-10-CM

## 2018-04-02 PROCEDURE — 93971 EXTREMITY STUDY: CPT | Mod: LT

## 2018-05-29 ENCOUNTER — OFFICE VISIT (OUTPATIENT)
Dept: URGENT CARE | Facility: PHYSICIAN GROUP | Age: 28
End: 2018-05-29
Payer: COMMERCIAL

## 2018-05-29 VITALS
DIASTOLIC BLOOD PRESSURE: 72 MMHG | HEART RATE: 100 BPM | OXYGEN SATURATION: 95 % | WEIGHT: 251 LBS | RESPIRATION RATE: 12 BRPM | HEIGHT: 66 IN | TEMPERATURE: 98.2 F | SYSTOLIC BLOOD PRESSURE: 122 MMHG | BODY MASS INDEX: 40.34 KG/M2

## 2018-05-29 DIAGNOSIS — R05.9 COUGH: ICD-10-CM

## 2018-05-29 DIAGNOSIS — J22 LRTI (LOWER RESPIRATORY TRACT INFECTION): ICD-10-CM

## 2018-05-29 PROCEDURE — 99214 OFFICE O/P EST MOD 30 MIN: CPT | Performed by: FAMILY MEDICINE

## 2018-05-29 RX ORDER — DOXYCYCLINE HYCLATE 100 MG
100 TABLET ORAL 2 TIMES DAILY
Qty: 20 TAB | Refills: 0 | Status: SHIPPED | OUTPATIENT
Start: 2018-05-29 | End: 2018-06-08

## 2018-05-29 RX ORDER — PROMETHAZINE HYDROCHLORIDE AND CODEINE PHOSPHATE 6.25; 1 MG/5ML; MG/5ML
5 SYRUP ORAL 4 TIMES DAILY PRN
Qty: 120 ML | Refills: 0 | Status: SHIPPED | OUTPATIENT
Start: 2018-05-29 | End: 2018-06-05

## 2018-05-29 ASSESSMENT — ENCOUNTER SYMPTOMS
HEADACHES: 1
MYALGIAS: 1
EYE DISCHARGE: 0
EYE REDNESS: 0
WEIGHT LOSS: 0

## 2018-05-30 NOTE — PROGRESS NOTES
"Subjective:      Dee Schreiber is a 27 y.o. female who presents with Cough (Dry cough, chest tightness, head pressure, body aches  x1week)            1 week productive cough without blood in sputum. Initial fever resolved. No SOB. Intermittent wheeze. No PMH asthma/pneumonia. +nasal congestion. Initial ST resolved. Cough is worse at night. OTC medications without relief. No other aggravating or alleviating factors.          Review of Systems   Constitutional: Positive for malaise/fatigue. Negative for weight loss.   HENT: Negative for ear discharge and ear pain.    Eyes: Negative for discharge and redness.   Cardiovascular: Negative for leg swelling.   Musculoskeletal: Positive for myalgias. Negative for joint pain.   Skin: Negative for itching and rash.   Neurological: Positive for headaches.     .  Medications, Allergies, and current problem list reviewed today in Epic       Objective:     /72   Pulse 100   Temp 36.8 °C (98.2 °F)   Resp 12   Ht 1.676 m (5' 6\")   Wt 113.9 kg (251 lb)   SpO2 95%   Breastfeeding? No   BMI 40.51 kg/m²      Physical Exam   Constitutional: She appears well-developed and well-nourished. No distress.   HENT:   Head: Normocephalic and atraumatic.   Eyes: Conjunctivae are normal.   Neck: Neck supple.   Cardiovascular: Normal rate, regular rhythm and normal heart sounds.    No murmur heard.  Pulmonary/Chest: Effort normal. She has wheezes (few and few rhonchi).   Lymphadenopathy:     She has no cervical adenopathy.   Neurological:   Speech is clear. Patient is appropriate and cooperative.     Skin: Skin is warm and dry. No rash noted.               Assessment/Plan:     1. LRTI (lower respiratory tract infection)  doxycycline (VIBRAMYCIN) 100 MG Tab   2. Cough  promethazine-codeine (PHENERGAN-CODEINE) 6.25-10 MG/5ML Syrup     Differential diagnosis, natural history, supportive care, and indications for immediate follow-up discussed at length.   Contingent antibiotic " prescription given to patient to fill upon meeting criteria of guidelines discussed.

## 2018-09-26 ENCOUNTER — OFFICE VISIT (OUTPATIENT)
Dept: URGENT CARE | Facility: PHYSICIAN GROUP | Age: 28
End: 2018-09-26
Payer: COMMERCIAL

## 2018-09-26 ENCOUNTER — HOSPITAL ENCOUNTER (OUTPATIENT)
Facility: MEDICAL CENTER | Age: 28
End: 2018-09-26
Attending: PHYSICIAN ASSISTANT
Payer: COMMERCIAL

## 2018-09-26 VITALS
SYSTOLIC BLOOD PRESSURE: 134 MMHG | RESPIRATION RATE: 20 BRPM | HEART RATE: 90 BPM | OXYGEN SATURATION: 97 % | DIASTOLIC BLOOD PRESSURE: 86 MMHG | TEMPERATURE: 98.4 F | WEIGHT: 238 LBS | HEIGHT: 64 IN | BODY MASS INDEX: 40.63 KG/M2

## 2018-09-26 DIAGNOSIS — N30.01 ACUTE CYSTITIS WITH HEMATURIA: ICD-10-CM

## 2018-09-26 LAB
APPEARANCE UR: NORMAL
BILIRUB UR STRIP-MCNC: NEGATIVE MG/DL
COLOR UR AUTO: NORMAL
GLUCOSE UR STRIP.AUTO-MCNC: NEGATIVE MG/DL
KETONES UR STRIP.AUTO-MCNC: NEGATIVE MG/DL
LEUKOCYTE ESTERASE UR QL STRIP.AUTO: NORMAL
NITRITE UR QL STRIP.AUTO: NEGATIVE
PH UR STRIP.AUTO: 6.5 [PH] (ref 5–8)
PROT UR QL STRIP: NORMAL MG/DL
RBC UR QL AUTO: NORMAL
SP GR UR STRIP.AUTO: 1.02
UROBILINOGEN UR STRIP-MCNC: 0.2 MG/DL

## 2018-09-26 PROCEDURE — 87086 URINE CULTURE/COLONY COUNT: CPT

## 2018-09-26 PROCEDURE — 87077 CULTURE AEROBIC IDENTIFY: CPT

## 2018-09-26 PROCEDURE — 99214 OFFICE O/P EST MOD 30 MIN: CPT | Performed by: PHYSICIAN ASSISTANT

## 2018-09-26 PROCEDURE — 81002 URINALYSIS NONAUTO W/O SCOPE: CPT | Performed by: PHYSICIAN ASSISTANT

## 2018-09-26 RX ORDER — NITROFURANTOIN 25; 75 MG/1; MG/1
100 CAPSULE ORAL EVERY 12 HOURS
Qty: 10 CAP | Refills: 0 | Status: SHIPPED | OUTPATIENT
Start: 2018-09-26 | End: 2018-10-01

## 2018-09-26 ASSESSMENT — ENCOUNTER SYMPTOMS
DIZZINESS: 0
CHILLS: 0
FEVER: 0
FLANK PAIN: 0
BACK PAIN: 1
ABDOMINAL PAIN: 1
PALPITATIONS: 0
SHORTNESS OF BREATH: 0
BLURRED VISION: 0

## 2018-09-27 DIAGNOSIS — N30.01 ACUTE CYSTITIS WITH HEMATURIA: ICD-10-CM

## 2018-09-27 NOTE — PROGRESS NOTES
Subjective:   CC: Dee Schreiberis a 28 y.o. female who presents for Abdominal Pain (lower abd pain pain with lower back pain off and on x 5 days)    HPI:  This is a new problem. Dee Schreiber is a 28 y.o. female who presents today complaining of 5 days of lower abdominal pain.  Patient states that her pain began on Saturday and is focused in her lower abdomen with occasional radiation to her lower back.  She denies any pain with urination and is unsure whether or not she has been going more frequently.  She denies fever/chills, shortness of breath, or lightheadedness/dizziness.        Review of Systems   Constitutional: Negative for chills, fever and malaise/fatigue.   Eyes: Negative for blurred vision.   Respiratory: Negative for shortness of breath.    Cardiovascular: Negative for chest pain and palpitations.   Gastrointestinal: Positive for abdominal pain.   Genitourinary: Negative for dysuria, flank pain, frequency, hematuria and urgency.   Musculoskeletal: Positive for back pain (Intermittent lower back pain).   Neurological: Negative for dizziness.          PMH:  has a past medical history of PCOS (polycystic ovarian syndrome) and UTI (lower urinary tract infection).  MEDS:   Current Outpatient Prescriptions:   •  metformin (GLUCOPHAGE) 1000 MG tablet, Take 1,000 mg by mouth 2 times a day, with meals., Disp: , Rfl:   •  nitrofurantoin monohydr macro (MACROBID) 100 MG Cap, Take 1 Cap by mouth every 12 hours for 5 days., Disp: 10 Cap, Rfl: 0  •  oxycodone-acetaminophen (PERCOCET) 5-325 MG Tab, Take 1 Tab by mouth every four hours as needed (for Moderate Pain (Pain Scale 4-6) after delivery)., Disp: 15 Tab, Rfl: 0  •  ibuprofen (MOTRIN) 600 MG Tab, Take 1 Tab by mouth every 6 hours as needed (For cramping after delivery; do not give if patient is receiving ketorolac (Toradol))., Disp: 30 Tab, Rfl: 3  •  Prenatal Vit-Fe Fumarate-FA (PRENATAL PO), Take 2 Tabs by mouth every day., Disp: ,  "Rfl:   ALLERGIES:   Allergies   Allergen Reactions   • Nkda [No Known Drug Allergy]      SURGHX:   Past Surgical History:   Procedure Laterality Date   • PB LAP,CHOLECYSTECTOMY  2011   • GYN SURGERY      D+C     SOCHX:  reports that she has quit smoking. She has never used smokeless tobacco. She reports that she does not drink alcohol or use drugs.  FH: Family history was reviewed, no pertinent findings to report       Objective:   /86 (BP Location: Right arm, Patient Position: Sitting, BP Cuff Size: Large adult)   Pulse 90   Temp 36.9 °C (98.4 °F) (Tympanic)   Resp 20   Ht 1.626 m (5' 4\")   Wt 108 kg (238 lb)   LMP 09/12/2018 (Approximate)   SpO2 97%   BMI 40.85 kg/m²        Physical Exam   Constitutional: She is oriented to person, place, and time. She appears well-developed and well-nourished.   HENT:   Head: Normocephalic and atraumatic.   Right Ear: External ear normal.   Left Ear: External ear normal.   Eyes: Pupils are equal, round, and reactive to light. Conjunctivae are normal.   Cardiovascular: Normal rate and regular rhythm.    No murmur heard.  Pulmonary/Chest: Effort normal and breath sounds normal.   Abdominal: Soft. Normal appearance. There is tenderness in the suprapubic area. There is no CVA tenderness.   Neurological: She is alert and oriented to person, place, and time.   Skin: Skin is warm and dry. Capillary refill takes less than 2 seconds.   Psychiatric: She has a normal mood and affect. Her behavior is normal. Judgment normal.        POCT Urinalysis: Small leuks, moderate blood, trace protein      Assessment/Plan:     1. Acute cystitis with hematuria  POCT Urinalysis [[id number]]    Urine Culture [[id number]]    nitrofurantoin monohydr macro (MACROBID) 100 MG Cap       - POCT Urinalysis [[id number]]  - Urine Culture [[id number]]; Future  - nitrofurantoin monohydr macro (MACROBID) 100 MG Cap; Take 1 Cap by mouth every 12 hours for 5 days.  Dispense: 10 Cap; Refill: 0  - OTC analgesics " as needed for pain      Differential diagnosis, natural history, supportive care, and indications for immediate follow-up discussed.  Return if symptoms worsen or fail to improve.    Case reviewed and discussed with Dr. Flores during Dee Morales PA-C's training period

## 2018-09-29 LAB
BACTERIA UR CULT: ABNORMAL
BACTERIA UR CULT: ABNORMAL
SIGNIFICANT IND 70042: ABNORMAL
SITE SITE: ABNORMAL
SOURCE SOURCE: ABNORMAL

## 2019-05-20 ENCOUNTER — OFFICE VISIT (OUTPATIENT)
Dept: URGENT CARE | Facility: PHYSICIAN GROUP | Age: 29
End: 2019-05-20
Payer: COMMERCIAL

## 2019-05-20 VITALS
RESPIRATION RATE: 16 BRPM | WEIGHT: 240 LBS | HEART RATE: 90 BPM | OXYGEN SATURATION: 96 % | HEIGHT: 65 IN | BODY MASS INDEX: 39.99 KG/M2 | TEMPERATURE: 98 F | DIASTOLIC BLOOD PRESSURE: 84 MMHG | SYSTOLIC BLOOD PRESSURE: 122 MMHG

## 2019-05-20 DIAGNOSIS — J06.9 VIRAL URI WITH COUGH: ICD-10-CM

## 2019-05-20 PROCEDURE — 99214 OFFICE O/P EST MOD 30 MIN: CPT | Performed by: FAMILY MEDICINE

## 2019-05-20 RX ORDER — BENZONATATE 100 MG/1
100 CAPSULE ORAL 3 TIMES DAILY PRN
Qty: 60 CAP | Refills: 0 | Status: SHIPPED | OUTPATIENT
Start: 2019-05-20 | End: 2019-12-21

## 2019-05-20 RX ORDER — AZITHROMYCIN 250 MG/1
TABLET, FILM COATED ORAL
Qty: 6 TAB | Refills: 0 | Status: SHIPPED | OUTPATIENT
Start: 2019-05-20 | End: 2019-12-21

## 2019-05-20 ASSESSMENT — ENCOUNTER SYMPTOMS
RHINORRHEA: 1
SHORTNESS OF BREATH: 0
CHILLS: 1
FEVER: 1
COUGH: 1
WHEEZING: 0

## 2019-05-20 NOTE — PROGRESS NOTES
"Subjective:   Dee Schreiber is a 28 y.o. female who presents for Cough (with congestion/runny nose and sore throat x 4 days )        Cough   This is a new problem. The current episode started in the past 7 days. The problem has been rapidly worsening. The problem occurs every few minutes. The cough is productive of sputum. Associated symptoms include chills, a fever, nasal congestion, postnasal drip and rhinorrhea. Pertinent negatives include no shortness of breath or wheezing.     Review of Systems   Constitutional: Positive for chills and fever.   HENT: Positive for postnasal drip and rhinorrhea.    Respiratory: Positive for cough. Negative for shortness of breath and wheezing.      Allergies   Allergen Reactions   • Nkda [No Known Drug Allergy]       Objective:   /84 (BP Location: Left arm, Patient Position: Sitting, BP Cuff Size: Large adult)   Pulse 90   Temp 36.7 °C (98 °F) (Temporal)   Resp 16   Ht 1.651 m (5' 5\")   Wt 108.9 kg (240 lb)   LMP 04/15/2019 (Exact Date) Comment: irregular cycles   SpO2 96%   Breastfeeding? Unknown   BMI 39.94 kg/m²   Physical Exam   Constitutional: She is oriented to person, place, and time. She appears well-developed and well-nourished. No distress.   HENT:   Head: Normocephalic and atraumatic.   Nose: Mucosal edema and rhinorrhea present. No sinus tenderness. Right sinus exhibits no maxillary sinus tenderness and no frontal sinus tenderness. Left sinus exhibits no maxillary sinus tenderness and no frontal sinus tenderness.   Eyes: Pupils are equal, round, and reactive to light. Conjunctivae and EOM are normal.   Cardiovascular: Normal rate and regular rhythm.    No murmur heard.  Pulmonary/Chest: Effort normal and breath sounds normal. No respiratory distress. She has no wheezes. She has no rales.   Abdominal: Soft. She exhibits no distension. There is no tenderness.   Neurological: She is alert and oriented to person, place, and time. She has normal " reflexes. No sensory deficit.   Skin: Skin is warm and dry.   Psychiatric: She has a normal mood and affect.         Assessment/Plan:   1. Viral URI with cough  - azithromycin (ZITHROMAX) 250 MG Tab; Take 2 tablets by mouth on day one. Take one tablet by mouth the remaining days until gone  Dispense: 6 Tab; Refill: 0  - benzonatate (TESSALON) 100 MG Cap; Take 1 Cap by mouth 3 times a day as needed for Cough.  Dispense: 60 Cap; Refill: 0  .anitb   Differential diagnosis, natural history, supportive care, and indications for immediate follow-up discussed.

## 2019-05-20 NOTE — LETTER
May 20, 2019         Patient: Dee Schreiber   YOB: 1990   Date of Visit: 5/20/2019           To Whom it May Concern:    Dee Schreiber was seen in my clinic on 5/20/2019. She may return to work on 5/23/2019 unless improved prior..    If you have any questions or concerns, please don't hesitate to call.        Sincerely,           Terrence Christina M.D.  Electronically Signed

## 2019-10-08 ENCOUNTER — HOSPITAL ENCOUNTER (OUTPATIENT)
Dept: RADIOLOGY | Facility: MEDICAL CENTER | Age: 29
End: 2019-10-08
Attending: NURSE PRACTITIONER
Payer: COMMERCIAL

## 2019-10-08 DIAGNOSIS — N63.0 BREAST LUMP: ICD-10-CM

## 2019-10-08 PROCEDURE — 76642 ULTRASOUND BREAST LIMITED: CPT | Mod: LT

## 2019-10-08 PROCEDURE — 77066 DX MAMMO INCL CAD BI: CPT

## 2019-12-21 ENCOUNTER — HOSPITAL ENCOUNTER (EMERGENCY)
Facility: MEDICAL CENTER | Age: 29
End: 2019-12-22
Attending: EMERGENCY MEDICINE
Payer: COMMERCIAL

## 2019-12-21 DIAGNOSIS — B37.31 CANDIDAL VAGINITIS: ICD-10-CM

## 2019-12-21 DIAGNOSIS — N30.00 ACUTE CYSTITIS WITHOUT HEMATURIA: ICD-10-CM

## 2019-12-21 LAB
APPEARANCE UR: ABNORMAL
BACTERIA #/AREA URNS HPF: ABNORMAL /HPF
BACTERIA GENITAL QL WET PREP: NORMAL
BILIRUB UR QL STRIP.AUTO: NEGATIVE
COLOR UR: YELLOW
EPI CELLS #/AREA URNS HPF: ABNORMAL /HPF
GLUCOSE BLD-MCNC: 130 MG/DL (ref 65–99)
GLUCOSE UR STRIP.AUTO-MCNC: NEGATIVE MG/DL
HCG UR QL: NEGATIVE
HYALINE CASTS #/AREA URNS LPF: ABNORMAL /LPF
KETONES UR STRIP.AUTO-MCNC: NEGATIVE MG/DL
LEUKOCYTE ESTERASE UR QL STRIP.AUTO: ABNORMAL
MICRO URNS: ABNORMAL
NITRITE UR QL STRIP.AUTO: NEGATIVE
PH UR STRIP.AUTO: 6.5 [PH] (ref 5–8)
PROT UR QL STRIP: NEGATIVE MG/DL
RBC # URNS HPF: ABNORMAL /HPF
RBC UR QL AUTO: NEGATIVE
SIGNIFICANT IND 70042: NORMAL
SITE SITE: NORMAL
SOURCE SOURCE: NORMAL
SP GR UR STRIP.AUTO: 1.03
UROBILINOGEN UR STRIP.AUTO-MCNC: 0.2 MG/DL
WBC #/AREA URNS HPF: ABNORMAL /HPF

## 2019-12-21 PROCEDURE — 99284 EMERGENCY DEPT VISIT MOD MDM: CPT

## 2019-12-21 PROCEDURE — 87591 N.GONORRHOEAE DNA AMP PROB: CPT

## 2019-12-21 PROCEDURE — 82962 GLUCOSE BLOOD TEST: CPT

## 2019-12-21 PROCEDURE — 87086 URINE CULTURE/COLONY COUNT: CPT

## 2019-12-21 PROCEDURE — 87491 CHLMYD TRACH DNA AMP PROBE: CPT

## 2019-12-21 PROCEDURE — 81025 URINE PREGNANCY TEST: CPT

## 2019-12-21 PROCEDURE — 81001 URINALYSIS AUTO W/SCOPE: CPT

## 2019-12-21 PROCEDURE — 87077 CULTURE AEROBIC IDENTIFY: CPT

## 2019-12-21 RX ORDER — FLUCONAZOLE 150 MG/1
150 TABLET ORAL
Qty: 2 TAB | Refills: 0 | Status: SHIPPED | OUTPATIENT
Start: 2019-12-21 | End: 2019-12-29

## 2019-12-21 RX ORDER — NITROFURANTOIN 25; 75 MG/1; MG/1
100 CAPSULE ORAL 2 TIMES DAILY
Qty: 14 CAP | Refills: 0 | Status: SHIPPED | OUTPATIENT
Start: 2019-12-21 | End: 2019-12-28

## 2019-12-21 RX ORDER — M-VIT,TX,IRON,MINS/CALC/FOLIC 27MG-0.4MG
1 TABLET ORAL DAILY
Status: SHIPPED | COMMUNITY
End: 2021-09-14

## 2019-12-21 SDOH — HEALTH STABILITY: MENTAL HEALTH: HOW MANY STANDARD DRINKS CONTAINING ALCOHOL DO YOU HAVE ON A TYPICAL DAY?: 1 OR 2

## 2019-12-21 SDOH — HEALTH STABILITY: MENTAL HEALTH: HOW OFTEN DO YOU HAVE A DRINK CONTAINING ALCOHOL?: MONTHLY OR LESS

## 2019-12-21 ASSESSMENT — LIFESTYLE VARIABLES
DO YOU DRINK ALCOHOL: YES
HAVE YOU EVER FELT YOU SHOULD CUT DOWN ON YOUR DRINKING: NO
HAVE PEOPLE ANNOYED YOU BY CRITICIZING YOUR DRINKING: NO
EVER HAD A DRINK FIRST THING IN THE MORNING TO STEADY YOUR NERVES TO GET RID OF A HANGOVER: NO
EVER FELT BAD OR GUILTY ABOUT YOUR DRINKING: NO
TOTAL SCORE: 0
TOTAL SCORE: 0
CONSUMPTION TOTAL: INCOMPLETE
TOTAL SCORE: 0

## 2019-12-22 VITALS
WEIGHT: 231.26 LBS | DIASTOLIC BLOOD PRESSURE: 85 MMHG | HEIGHT: 65 IN | HEART RATE: 88 BPM | OXYGEN SATURATION: 95 % | SYSTOLIC BLOOD PRESSURE: 114 MMHG | RESPIRATION RATE: 16 BRPM | TEMPERATURE: 98.1 F | BODY MASS INDEX: 38.53 KG/M2

## 2019-12-22 NOTE — ED NOTES
Patient verbalized understanding of discharge instructions, provided with discharge paperwork, gait steady, ambulated independently to MARCOS ritter.

## 2019-12-22 NOTE — ED TRIAGE NOTES
Dee Schreiber  29 y.o.  Chief Complaint   Patient presents with   • Vaginal Discharge     pt has milky white/yellows discharge starting earlier today, mild intermitten lower abdominal cramping. denies ordor or burning with urination.        Patient to triage ambulatory with above complaint.  Pt states she is also having a lot of itching in vagina.            Vitals:    12/21/19 2107   BP: 123/81   Pulse: 83   Resp: 18   Temp: 36.7 °C (98.1 °F)   SpO2: 98%       Triage process explained to patient, apologized for wait time, and returned to lobby.

## 2019-12-22 NOTE — ED PROVIDER NOTES
"ED Provider Note    CHIEF COMPLAINT  Chief Complaint   Patient presents with   • Vaginal Discharge     pt has milky white/yellows discharge starting earlier today, mild intermitten lower abdominal cramping. denies ordor or burning with urination.        CARROL Schreiber is a 29 y.o. female who presents to the emergency department chief complaint of 1 day of burning itching vaginal discharge.  She denies any discomfort in the abdomen a dysuria flank pain or hematuria.  She is sexually active with one partner and he denies any penile discharge or testicular pain.  No fevers or chills no nausea or vomiting    REVIEW OF SYSTEMS  Positives as above. Pertinent negatives include fevers chills nausea vomiting dysuria hematuria flank pain  All other review of systems are negative    PAST MEDICAL HISTORY   has a past medical history of PCOS (polycystic ovarian syndrome) and UTI (lower urinary tract infection).    SOCIAL HISTORY  Social History     Tobacco Use   • Smoking status: Former Smoker     Packs/day: 0.00   • Smokeless tobacco: Never Used   Substance and Sexual Activity   • Alcohol use: Yes     Frequency: Monthly or less     Drinks per session: 1 or 2   • Drug use: No   • Sexual activity: Never     Comment: pt unsre pt is breast/bottle feeding        SURGICAL HISTORY   has a past surgical history that includes lap,cholecystectomy (2011); gyn surgery; and cholecystectomy (2011).    CURRENT MEDICATIONS  Home Medications     Reviewed by Alem Cedeno R.N. (Registered Nurse) on 12/21/19 at 3412  Med List Status: Complete   Medication Last Dose Status   therapeutic multivitamin-minerals (THERAGRAN-M) Tab  Active                ALLERGIES  Allergies   Allergen Reactions   • Nkda [No Known Drug Allergy]        PHYSICAL EXAM  VITAL SIGNS: /81   Pulse 83   Temp 36.7 °C (98.1 °F)   Resp 18   Ht 1.651 m (5' 5\")   Wt 104.9 kg (231 lb 4.2 oz)   LMP 11/27/2019 (Exact Date)   SpO2 98%   BMI 38.48 kg/m²  "   Pulse ox interpretation: I interpret this pulse ox as normal.  Constitutional: Alert in no apparent distress.  HENT: Normocephalic, Atraumatic, MMM  Eyes: PERound. Conjunctiva normal, non-icteric.   Heart: Regular rate and rhythm, no murmurs.    Lungs: Clear to auscultation bilaterally. No resp distress, breath sounds equal  Abdomen: Non-tender, non-distended, normal bowel sounds  Pelvic and vaginal exam    External:  No lesions, normal labia, clitoris  Vaginal vault:  No lesions.  Copious white thick discharge, no bleeding.  Cervix:  No significant discharge.  ]no CMT. No lesions.     Skin: Warm, Dry, No erythema, No rash.   Neurologic: Alert and oriented, Grossly non-focal.       DIFFERENTIAL DIAGNOSIS AND WORK UP PLAN    This is a 29 y.o. female who presents with vaginal exam consistent most likely with candidal vaginitis.  Will evaluate with a wet prep urinalysis as well as a GC CT    Pertinent Lab Findings  Labs Reviewed   URINALYSIS,CULTURE IF INDICATED - Abnormal; Notable for the following components:       Result Value    Character Cloudy (*)     Leukocyte Esterase Large (*)     All other components within normal limits   URINE MICROSCOPIC (W/UA) - Abnormal; Notable for the following components:    WBC  (*)     Bacteria Moderate (*)     Hyaline Cast 6-10 (*)     All other components within normal limits   ACCU-CHEK GLUCOSE - Abnormal; Notable for the following components:    Glucose - Accu-Ck 130 (*)     All other components within normal limits   WET PREP   CHLAMYDIA/GC PCR URINE OR SWAB   HCG QUALITATIVE UR   URINE CULTURE(NEW)   Wet prep positive for yeast    Radiology  No orders to display     The radiologist's interpretation of all radiological studies have been reviewed by me.    COURSE & MEDICAL DECISION MAKING  Pertinent Labs & Imaging studies reviewed. (See chart for details)    11:54 PM  Patient's wet prep was positive for vaginosis which was consistent with my examination, also evidence of  urinary tract infection.  She will be discharged on oral antibiotics as well as oral fluconazole stricture precautions for any new or worsening issues.  There is no signs or symptoms that would be consistent with pyelonephritis at this time    The patient will return for new or worsening symptoms and is stable at the time of discharge.    The patient is referred to a primary physician for blood pressure management, diabetic screening, and for all other preventative health concerns.    DISPOSITION:  Patient will be discharged home in stable condition.    FOLLOW UP:  TROY Martinez  [address]  [facility] [address]  [phone]    Schedule an appointment as soon as possible for a visit       [facility], Emergency Dept  [address] [address] [phone]    If symptoms worsen      OUTPATIENT MEDICATIONS:  New Prescriptions    FLUCONAZOLE (DIFLUCAN) 150 MG TABLET    Take 1 Tab by mouth every 7 days for 2 doses.    NITROFURANTOIN MONOHYD MACRO (MACROBID) 100 MG CAP    Take 1 Cap by mouth 2 times a day for 7 days.       FINAL IMPRESSION  1. Acute cystitis without hematuria     2. Candidal vaginitis                Electronically signed by: Catina Michaels, 12/21/2019 9:34 PM    This dictation has been created using voice recognition software and/or scribes. The accuracy of the dictation is limited by the abilities of the software and the expertise of the scribes. I expect there may be some errors of grammar and possibly content. I made every attempt to manually correct the errors within my dictation. However, errors related to voice recognition software and/or scribes may still exist and should be interpreted within the appropriate context.

## 2019-12-23 LAB
C TRACH DNA SPEC QL NAA+PROBE: NEGATIVE
N GONORRHOEA DNA SPEC QL NAA+PROBE: NEGATIVE
SPECIMEN SOURCE: NORMAL

## 2019-12-24 NOTE — ED NOTES
"ED Positive Culture Follow-up/Notification Note:    Date: 12/24/19     Patient seen in the ED on 12/21/2019 for vaginal discharge. The pt denied any dysuria, flank pain, fevers/chills, N/V, or hematuria.     1. Acute cystitis without hematuria    2. Candidal vaginitis       Discharge Medication List as of 12/22/2019 12:04 AM      START taking these medications    Details   fluconazole (DIFLUCAN) 150 MG tablet Take 1 Tab by mouth every 7 days for 2 doses.Take one tablet, wait one week if symptoms not resolved, take second tabletDisp-2 Tab, R-0, Normal      nitrofurantoin monohyd macro (MACROBID) 100 MG Cap Take 1 Cap by mouth 2 times a day for 7 days., Disp-14 Cap, R-0, Normal           Medications given in the ED:  -None    Allergies: Nkda [no known drug allergy]     Vitals:    12/21/19 2107 12/21/19 2114 12/22/19 0009   BP: 123/81  114/85   Pulse: 83  88   Resp: 18  16   Temp: 36.7 °C (98.1 °F)     SpO2: 98%  95%   Weight:  104.9 kg (231 lb 4.2 oz)    Height: 1.651 m (5' 5\")         Final cultures:   Results     Procedure Component Value Units Date/Time    URINE CULTURE(NEW) [[id number]]  (Abnormal) Collected:  12/21/19 2150    Order Status:  Completed Specimen:  Urine Updated:  12/24/19 0928     Significant Indicator POS     Source UR     Site -     Culture Result Mixed skin leyda 10-50,000 cfu/mL      Streptococcus agalactiae (Group B)  10-50,000 cfu/mL      Chlamydia/GC PCR Urine Or Swab [[id number]] Collected:  12/21/19 2200    Order Status:  Completed Specimen:  Genital Updated:  12/23/19 1732     C. trachomatis by PCR Negative     N. gonorrhoeae by PCR Negative     Source Vaginal    WET PREP [[id number]] Collected:  12/21/19 2200    Order Status:  Completed Specimen:  Genital from Vaginal Updated:  12/21/19 2350     Significant Indicator NEG     Source GEN     Site VAGINAL     Wet Prep For Parasites No clue cells seen.  No motile Trichomonas seen.  Moderate yeast.      URINALYSIS CULTURE, IF INDICATED " [[id number]]  (Abnormal) Collected:  12/21/19 2150    Order Status:  Completed Specimen:  Genital from Urine, Clean Catch Updated:  12/21/19 2209     Color Yellow     Character Cloudy     Specific Gravity 1.029     Ph 6.5     Glucose Negative mg/dL      Ketones Negative mg/dL      Protein Negative mg/dL      Bilirubin Negative     Urobilinogen, Urine 0.2     Nitrite Negative     Leukocyte Esterase Large     Occult Blood Negative     Micro Urine Req Microscopic          Plan:   Group B strep in mixed skin leyda is a common colonizer of the female genitourinary tract and does not likely represent a true urinary pathogen. The pt did not report any symptoms of UTI at ED visit.     I called and informed the pt of the results. She denied any UTI symptoms and reported that her candidiasis symptoms had resolved. I counseled her that she may stop nitrofurantoin after 5 days of therapy and that it will provide good empiric coverage for common UTI pathogens. I also counseled her that she may take the second dose of fluconazole once she finishes nitrofurantoin if needed for vulvovaginal candidiasis (usually given 72 hours after first dose), but that nitrofurantoin lacks broad tissue penetration which should decrease the risk of secondary fungal infection. The pt stated understanding and had no further questions.         Marcel Phillips, PharmD

## 2020-03-01 ENCOUNTER — OFFICE VISIT (OUTPATIENT)
Dept: URGENT CARE | Facility: PHYSICIAN GROUP | Age: 30
End: 2020-03-01
Payer: COMMERCIAL

## 2020-03-01 VITALS
HEART RATE: 90 BPM | OXYGEN SATURATION: 95 % | DIASTOLIC BLOOD PRESSURE: 76 MMHG | BODY MASS INDEX: 39.99 KG/M2 | TEMPERATURE: 97.9 F | HEIGHT: 65 IN | WEIGHT: 240 LBS | RESPIRATION RATE: 18 BRPM | SYSTOLIC BLOOD PRESSURE: 108 MMHG

## 2020-03-01 DIAGNOSIS — J06.9 VIRAL URI WITH COUGH: ICD-10-CM

## 2020-03-01 LAB
FLUAV+FLUBV AG SPEC QL IA: NEGATIVE
INT CON NEG: NEGATIVE
INT CON NEG: NEGATIVE
INT CON POS: POSITIVE
INT CON POS: POSITIVE
S PYO AG THROAT QL: NEGATIVE

## 2020-03-01 PROCEDURE — 99213 OFFICE O/P EST LOW 20 MIN: CPT | Performed by: FAMILY MEDICINE

## 2020-03-01 PROCEDURE — 87804 INFLUENZA ASSAY W/OPTIC: CPT | Performed by: FAMILY MEDICINE

## 2020-03-01 PROCEDURE — 87880 STREP A ASSAY W/OPTIC: CPT | Performed by: FAMILY MEDICINE

## 2020-03-01 ASSESSMENT — ENCOUNTER SYMPTOMS
MYALGIAS: 1
STRIDOR: 0
FEVER: 1
SHORTNESS OF BREATH: 1
HEADACHES: 0
COUGH: 1
VOMITING: 0
CHILLS: 1

## 2020-03-01 NOTE — PROGRESS NOTES
"Subjective:   Dee Schreiber  is a 29 y.o. female who presents for Sore Throat (fever, bodyaches x3days, cough, chest pain when breathing xtoday)        Pharyngitis    This is a new problem. The current episode started in the past 7 days. The problem has been gradually worsening. Neither side of throat is experiencing more pain than the other. Maximum temperature: Unmeasured. The pain is moderate. Associated symptoms include coughing and shortness of breath. Pertinent negatives include no headaches, stridor or vomiting.     Review of Systems   Constitutional: Positive for chills and fever.   Respiratory: Positive for cough and shortness of breath. Negative for stridor.    Gastrointestinal: Negative for vomiting.   Musculoskeletal: Positive for myalgias.   Neurological: Negative for headaches.     Allergies   Allergen Reactions   • Nkda [No Known Drug Allergy]       Objective:   /76 (BP Location: Left arm, Patient Position: Sitting, BP Cuff Size: Adult)   Pulse 90   Temp 36.6 °C (97.9 °F) (Temporal)   Resp 18   Ht 1.651 m (5' 5\")   Wt 108.9 kg (240 lb)   SpO2 95%   BMI 39.94 kg/m²   Physical Exam  Constitutional:       General: She is not in acute distress.     Appearance: She is well-developed.   HENT:      Head: Normocephalic and atraumatic.      Mouth/Throat:      Pharynx: Uvula midline. Posterior oropharyngeal erythema present.      Tonsils: No tonsillar abscesses.   Eyes:      Conjunctiva/sclera: Conjunctivae normal.      Pupils: Pupils are equal, round, and reactive to light.   Cardiovascular:      Rate and Rhythm: Normal rate and regular rhythm.      Heart sounds: No murmur.   Pulmonary:      Effort: Pulmonary effort is normal. No respiratory distress.      Breath sounds: Normal breath sounds. No wheezing or rhonchi.   Abdominal:      General: There is no distension.      Palpations: Abdomen is soft.      Tenderness: There is no abdominal tenderness.   Skin:     General: Skin is warm " and dry.   Neurological:      Mental Status: She is alert and oriented to person, place, and time.      Sensory: No sensory deficit.      Deep Tendon Reflexes: Reflexes are normal and symmetric.           Assessment/Plan:   1. Viral URI with cough  - POCT Rapid Strep A  - POCT Influenza A/B  OTC cold and cough medications per 's directions.   Differential diagnosis, natural history, supportive care, and indications for immediate follow-up discussed.

## 2020-03-01 NOTE — LETTER
March 1, 2020         Patient: Dee Schreiber   YOB: 1990   Date of Visit: 3/1/2020           To Whom it May Concern:    Dee Schreiber was seen in my clinic on 3/1/2020. She may return to work on 3/4/2020 unless improved significantly prior..    If you have any questions or concerns, please don't hesitate to call.        Sincerely,           Terrence Christina M.D.  Electronically Signed

## 2020-05-16 ENCOUNTER — HOSPITAL ENCOUNTER (OUTPATIENT)
Dept: LAB | Facility: MEDICAL CENTER | Age: 30
End: 2020-05-16
Attending: OBSTETRICS & GYNECOLOGY
Payer: COMMERCIAL

## 2020-05-16 LAB
ALBUMIN SERPL BCP-MCNC: 4.5 G/DL (ref 3.2–4.9)
ALBUMIN/GLOB SERPL: 1.6 G/DL
ALP SERPL-CCNC: 67 U/L (ref 30–99)
ALT SERPL-CCNC: 31 U/L (ref 2–50)
ANION GAP SERPL CALC-SCNC: 10 MMOL/L (ref 7–16)
AST SERPL-CCNC: 25 U/L (ref 12–45)
BASOPHILS # BLD AUTO: 0.5 % (ref 0–1.8)
BASOPHILS # BLD: 0.04 K/UL (ref 0–0.12)
BILIRUB SERPL-MCNC: 0.6 MG/DL (ref 0.1–1.5)
BUN SERPL-MCNC: 9 MG/DL (ref 8–22)
CALCIUM SERPL-MCNC: 9.3 MG/DL (ref 8.5–10.5)
CHLORIDE SERPL-SCNC: 104 MMOL/L (ref 96–112)
CHOLEST SERPL-MCNC: 229 MG/DL (ref 100–199)
CO2 SERPL-SCNC: 26 MMOL/L (ref 20–33)
CREAT SERPL-MCNC: 0.59 MG/DL (ref 0.5–1.4)
EOSINOPHIL # BLD AUTO: 0.08 K/UL (ref 0–0.51)
EOSINOPHIL NFR BLD: 1 % (ref 0–6.9)
ERYTHROCYTE [DISTWIDTH] IN BLOOD BY AUTOMATED COUNT: 43.5 FL (ref 35.9–50)
EST. AVERAGE GLUCOSE BLD GHB EST-MCNC: 103 MG/DL
FSH SERPL-ACNC: 5.9 MIU/ML
GLOBULIN SER CALC-MCNC: 2.8 G/DL (ref 1.9–3.5)
GLUCOSE SERPL-MCNC: 83 MG/DL (ref 65–99)
HBA1C MFR BLD: 5.2 % (ref 0–5.6)
HCT VFR BLD AUTO: 44.7 % (ref 37–47)
HDLC SERPL-MCNC: 58 MG/DL
HGB BLD-MCNC: 14.8 G/DL (ref 12–16)
IMM GRANULOCYTES # BLD AUTO: 0.03 K/UL (ref 0–0.11)
IMM GRANULOCYTES NFR BLD AUTO: 0.4 % (ref 0–0.9)
LDLC SERPL CALC-MCNC: 143 MG/DL
LH SERPL-ACNC: 18.8 IU/L
LYMPHOCYTES # BLD AUTO: 2.47 K/UL (ref 1–4.8)
LYMPHOCYTES NFR BLD: 32.4 % (ref 22–41)
MCH RBC QN AUTO: 31.6 PG (ref 27–33)
MCHC RBC AUTO-ENTMCNC: 33.1 G/DL (ref 33.6–35)
MCV RBC AUTO: 95.5 FL (ref 81.4–97.8)
MONOCYTES # BLD AUTO: 0.55 K/UL (ref 0–0.85)
MONOCYTES NFR BLD AUTO: 7.2 % (ref 0–13.4)
NEUTROPHILS # BLD AUTO: 4.45 K/UL (ref 2–7.15)
NEUTROPHILS NFR BLD: 58.5 % (ref 44–72)
NRBC # BLD AUTO: 0 K/UL
NRBC BLD-RTO: 0 /100 WBC
PLATELET # BLD AUTO: 293 K/UL (ref 164–446)
PMV BLD AUTO: 10 FL (ref 9–12.9)
POTASSIUM SERPL-SCNC: 4 MMOL/L (ref 3.6–5.5)
PROT SERPL-MCNC: 7.3 G/DL (ref 6–8.2)
RBC # BLD AUTO: 4.68 M/UL (ref 4.2–5.4)
SODIUM SERPL-SCNC: 140 MMOL/L (ref 135–145)
T4 FREE SERPL-MCNC: 0.95 NG/DL (ref 0.93–1.7)
TRIGL SERPL-MCNC: 139 MG/DL (ref 0–149)
TSH SERPL DL<=0.005 MIU/L-ACNC: 1.19 UIU/ML (ref 0.38–5.33)
WBC # BLD AUTO: 7.6 K/UL (ref 4.8–10.8)

## 2020-05-16 PROCEDURE — 85025 COMPLETE CBC W/AUTO DIFF WBC: CPT

## 2020-05-16 PROCEDURE — 80053 COMPREHEN METABOLIC PANEL: CPT

## 2020-05-16 PROCEDURE — 83036 HEMOGLOBIN GLYCOSYLATED A1C: CPT

## 2020-05-16 PROCEDURE — 83001 ASSAY OF GONADOTROPIN (FSH): CPT

## 2020-05-16 PROCEDURE — 84443 ASSAY THYROID STIM HORMONE: CPT

## 2020-05-16 PROCEDURE — 36415 COLL VENOUS BLD VENIPUNCTURE: CPT

## 2020-05-16 PROCEDURE — 80061 LIPID PANEL: CPT

## 2020-05-16 PROCEDURE — 83002 ASSAY OF GONADOTROPIN (LH): CPT

## 2020-05-16 PROCEDURE — 84439 ASSAY OF FREE THYROXINE: CPT

## 2020-05-18 ENCOUNTER — HOSPITAL ENCOUNTER (OUTPATIENT)
Dept: LAB | Facility: MEDICAL CENTER | Age: 30
End: 2020-05-18
Attending: OBSTETRICS & GYNECOLOGY
Payer: COMMERCIAL

## 2020-05-18 PROCEDURE — 87491 CHLMYD TRACH DNA AMP PROBE: CPT

## 2020-05-18 PROCEDURE — 87591 N.GONORRHOEAE DNA AMP PROB: CPT

## 2020-05-18 PROCEDURE — 88175 CYTOPATH C/V AUTO FLUID REDO: CPT

## 2020-05-19 LAB
AMBIGUOUS DTTM AMBI4: NORMAL
C TRACH DNA GENITAL QL NAA+PROBE: NEGATIVE
CYTOLOGY REG CYTOL: NORMAL
N GONORRHOEA DNA GENITAL QL NAA+PROBE: NEGATIVE
SPECIMEN SOURCE: NORMAL

## 2020-05-29 ENCOUNTER — OFFICE VISIT (OUTPATIENT)
Dept: ENDOCRINOLOGY | Facility: MEDICAL CENTER | Age: 30
End: 2020-05-29
Payer: COMMERCIAL

## 2020-05-29 VITALS
BODY MASS INDEX: 41.32 KG/M2 | HEIGHT: 64 IN | HEART RATE: 73 BPM | WEIGHT: 242 LBS | SYSTOLIC BLOOD PRESSURE: 118 MMHG | OXYGEN SATURATION: 99 % | DIASTOLIC BLOOD PRESSURE: 68 MMHG

## 2020-05-29 DIAGNOSIS — E03.9 HYPOTHYROIDISM, UNSPECIFIED TYPE: ICD-10-CM

## 2020-05-29 DIAGNOSIS — E55.9 VITAMIN D DEFICIENCY: ICD-10-CM

## 2020-05-29 DIAGNOSIS — R53.83 FATIGUE, UNSPECIFIED TYPE: ICD-10-CM

## 2020-05-29 DIAGNOSIS — E28.2 PCOS (POLYCYSTIC OVARIAN SYNDROME): ICD-10-CM

## 2020-05-29 DIAGNOSIS — R79.89 PATHOLOGIC HIGH SERUM PROLACTIN: ICD-10-CM

## 2020-05-29 DIAGNOSIS — E53.8 VITAMIN B 12 DEFICIENCY: ICD-10-CM

## 2020-05-29 DIAGNOSIS — E24.0 CUSHING'S DISEASE (HCC): ICD-10-CM

## 2020-05-29 DIAGNOSIS — Z79.899 HIGH RISK MEDICATION USE: ICD-10-CM

## 2020-05-29 DIAGNOSIS — L68.0 HIRSUTISM: ICD-10-CM

## 2020-05-29 PROCEDURE — 99205 OFFICE O/P NEW HI 60 MIN: CPT | Performed by: INTERNAL MEDICINE

## 2020-05-29 RX ORDER — METFORMIN HYDROCHLORIDE 500 MG/1
500 TABLET, EXTENDED RELEASE ORAL 2 TIMES DAILY
Qty: 180 TAB | Refills: 3 | Status: SHIPPED | OUTPATIENT
Start: 2020-05-29 | End: 2021-09-14

## 2020-05-29 RX ORDER — PHENTERMINE HYDROCHLORIDE 15 MG/1
15 CAPSULE ORAL EVERY MORNING
Qty: 15 CAP | Refills: 0 | Status: SHIPPED | OUTPATIENT
Start: 2020-05-29 | End: 2020-06-13

## 2020-05-29 RX ORDER — SPIRONOLACTONE 100 MG/1
100 TABLET, FILM COATED ORAL 2 TIMES DAILY
Qty: 180 TAB | Refills: 3 | Status: SHIPPED | OUTPATIENT
Start: 2020-05-29 | End: 2021-09-14

## 2020-05-29 RX ORDER — PHENTERMINE HYDROCHLORIDE 37.5 MG/1
37.5 CAPSULE ORAL EVERY MORNING
Qty: 30 CAP | Refills: 3 | Status: SHIPPED | OUTPATIENT
Start: 2020-05-29 | End: 2020-09-26

## 2020-05-29 ASSESSMENT — FIBROSIS 4 INDEX: FIB4 SCORE: 0.46

## 2020-06-02 NOTE — PROGRESS NOTES
New Patient Consult Note  Primary care physician: Pcp Pt States None    Reason for consult: PCOS, obesity, hirsutism .    HPI:  Dee Schreiber is a 29 y.o. old patient who comes in today for evaluation of PCOS, obesity and hirsutism.    PCOS: has irregular menstrual cycles and facial hirsutism and excessive hair on other parts of body.     ROS:  Constitutional: weight gain, fatigue,No weight loss  Cardiac: No palpitations or racing heart  Resp: No shortness of breath  Neuro: No numbness or tinging in feet  Endo: facial hirsutism,No heat or cold intolerance, no polyuria or polydipsia  All other systems were reviewed and were negative.    Past Medical History:  There are no active problems to display for this patient.      Past Surgical History:  Past Surgical History:   Procedure Laterality Date   • PB LAP,CHOLECYSTECTOMY  2011   • CHOLECYSTECTOMY  2011   • GYN SURGERY      D+C       Allergies:  Nkda [no known drug allergy]    Social History:  Social History     Socioeconomic History   • Marital status:      Spouse name: Not on file   • Number of children: Not on file   • Years of education: Not on file   • Highest education level: Not on file   Occupational History   • Not on file   Social Needs   • Financial resource strain: Not on file   • Food insecurity     Worry: Not on file     Inability: Not on file   • Transportation needs     Medical: Not on file     Non-medical: Not on file   Tobacco Use   • Smoking status: Former Smoker     Packs/day: 0.00   • Smokeless tobacco: Never Used   Substance and Sexual Activity   • Alcohol use: Yes     Frequency: Monthly or less     Drinks per session: 1 or 2   • Drug use: No   • Sexual activity: Never     Comment: pt unsre pt is breast/bottle feeding    Lifestyle   • Physical activity     Days per week: Not on file     Minutes per session: Not on file   • Stress: Not on file   Relationships   • Social connections     Talks on phone: Not on file     Gets  "together: Not on file     Attends Church service: Not on file     Active member of club or organization: Not on file     Attends meetings of clubs or organizations: Not on file     Relationship status: Not on file   • Intimate partner violence     Fear of current or ex partner: Not on file     Emotionally abused: Not on file     Physically abused: Not on file     Forced sexual activity: Not on file   Other Topics Concern   • Not on file   Social History Narrative   • Not on file       Family History:  Family History   Problem Relation Age of Onset   • Cancer Maternal Aunt         breast       Medications:    Current Outpatient Medications:   •  phentermine 15 MG capsule, Take 1 Cap by mouth every morning for 15 days., Disp: 15 Cap, Rfl: 0  •  phentermine 37.5 MG capsule, Take 1 Cap by mouth every morning for 120 days., Disp: 30 Cap, Rfl: 3  •  spironolactone (ALDACTONE) 100 MG Tab, Take 1 Tab by mouth 2 times a day., Disp: 180 Tab, Rfl: 3  •  metFORMIN ER (GLUCOPHAGE XR) 500 MG TABLET SR 24 HR, Take 1 Tab by mouth 2 times a day. Generic ER metformin is fine., Disp: 180 Tab, Rfl: 3  •  therapeutic multivitamin-minerals (THERAGRAN-M) Tab, Take 1 Tab by mouth every day., Disp: , Rfl:     Labs: Reviewed    Physical Examination:  Vital signs: /68 (BP Location: Left arm, Patient Position: Sitting, BP Cuff Size: Adult)   Pulse 73   Ht 1.626 m (5' 4\")   Wt 109.8 kg (242 lb)   SpO2 99%   BMI 41.54 kg/m²  Body mass index is 41.54 kg/m².  General: No apparent distress, cooperative  Eyes: No scleral icterus or discharge  ENMT: Normal on external inspection of nose, lips, normal thyroid exam  Neck: No abnormal masses on inspection  Resp: Normal effort, clear to auscultation bilaterally   CVS: Regular rate and rhythm, S1 S2 normal, no murmur   Extremities: No edema  Abdomen: abdominal obesity present  Neuro: Alert and oriented  Skin: No rash  Psych: Normal mood and affect, intact memory and able to make informed " decisions    Assessment and Plan:    1. PCOS (polycystic ovarian syndrome)  start  - spironolactone (ALDACTONE) 100 MG Tab; Take 1 Tab by mouth 2 times a day.  Dispense: 180 Tab; Refill: 3  start   metFORMIN ER (GLUCOPHAGE XR) 500 MG TABLET SR 24 HR; Take 1 Tab by mouth 2 times a day. Generic ER metformin is fine.  Dispense: 180 Tab; Refill: 3  - Basic Metabolic Panel; Future    Side effects and benefits discussed. To use two methods of contraception; cannot get pregnant on spironolactone due to adverse effects on baby.     2. Fatigue, unspecified type  Rule out Vitamin D , b 12  Deficiency and hypothyroidism as the contributory factor for fatigue.   - CBC WITHOUT DIFFERENTIAL; Future    3.Hirsutism  Spironolactone will help and also weight loss will help.   - CBC WITHOUT DIFFERENTIAL; Future    4. BMI 40.0-44.9, adult (HCC)  Add phentermine as adjunct to diet and exercise measures for weight loss. Side effects and benefits discussed; discussed teratogenic effects of phentermine.  - phentermine 15 MG capsule; Take 1 Cap by mouth every morning for 15 days.  Dispense: 15 Cap; Refill: 0  - phentermine 37.5 MG capsule; Take 1 Cap by mouth every morning for 120 days.  Dispense: 30 Cap; Refill: 3      Return in about 3 months (around 8/29/2020).    Total face to face time spent with patient equals 60 minutes. 35/60 minutes were spent on counseling the patient about the pathophysiology of pituitary-thyroid axis, pituitary-adrenal axis, pituitary-gonadal axis,  side effects and benefits of thyroid hormone,  Vit D and Vit B12 replacement if needed. Counseled on teratogenic effects of spironolactone and phentermine and advised to use at least two methods of contraception.     Thank you for allowing me to participate in the care of this patient.    Jg Duque M.D.  06/02/20    CC:   Pcp Pt States None    This note was created using voice recognition software (Dragon). The accuracy of the dictation is limited by the  abilities of the software. I have reviewed the note prior to signing, however some errors in grammar and context are still possible. If you have any questions related to this note please do not hesitate to contact our office.

## 2020-09-02 ENCOUNTER — HOSPITAL ENCOUNTER (OUTPATIENT)
Facility: MEDICAL CENTER | Age: 30
End: 2020-09-02
Attending: SURGERY
Payer: COMMERCIAL

## 2020-09-02 LAB
APPEARANCE UR: ABNORMAL
BACTERIA #/AREA URNS HPF: ABNORMAL /HPF
BILIRUB UR QL STRIP.AUTO: NEGATIVE
COLOR UR: YELLOW
EPI CELLS #/AREA URNS HPF: ABNORMAL /HPF
GLUCOSE UR STRIP.AUTO-MCNC: NEGATIVE MG/DL
KETONES UR STRIP.AUTO-MCNC: NEGATIVE MG/DL
LEUKOCYTE ESTERASE UR QL STRIP.AUTO: ABNORMAL
MICRO URNS: ABNORMAL
NITRITE UR QL STRIP.AUTO: NEGATIVE
PH UR STRIP.AUTO: 6 [PH] (ref 5–8)
PROT UR QL STRIP: NEGATIVE MG/DL
RBC # URNS HPF: ABNORMAL /HPF
RBC UR QL AUTO: NEGATIVE
SP GR UR STRIP.AUTO: 1.02
UROBILINOGEN UR STRIP.AUTO-MCNC: 0.2 MG/DL
WBC #/AREA URNS HPF: ABNORMAL /HPF

## 2020-09-02 PROCEDURE — 81001 URINALYSIS AUTO W/SCOPE: CPT

## 2020-09-02 PROCEDURE — 87086 URINE CULTURE/COLONY COUNT: CPT

## 2020-09-05 LAB
BACTERIA UR CULT: NORMAL
SIGNIFICANT IND 70042: NORMAL
SITE SITE: NORMAL
SOURCE SOURCE: NORMAL

## 2020-11-12 ENCOUNTER — HOSPITAL ENCOUNTER (OUTPATIENT)
Facility: MEDICAL CENTER | Age: 30
End: 2020-11-12
Attending: SURGERY
Payer: COMMERCIAL

## 2020-11-12 LAB
COVID ORDER STATUS COVID19: NORMAL
SARS-COV-2 RNA RESP QL NAA+PROBE: NOTDETECTED
SPECIMEN SOURCE: NORMAL

## 2020-11-12 PROCEDURE — U0003 INFECTIOUS AGENT DETECTION BY NUCLEIC ACID (DNA OR RNA); SEVERE ACUTE RESPIRATORY SYNDROME CORONAVIRUS 2 (SARS-COV-2) (CORONAVIRUS DISEASE [COVID-19]), AMPLIFIED PROBE TECHNIQUE, MAKING USE OF HIGH THROUGHPUT TECHNOLOGIES AS DESCRIBED BY CMS-2020-01-R: HCPCS

## 2020-11-14 ENCOUNTER — HOSPITAL ENCOUNTER (EMERGENCY)
Facility: MEDICAL CENTER | Age: 30
End: 2020-11-14
Attending: EMERGENCY MEDICINE
Payer: COMMERCIAL

## 2020-11-14 VITALS
DIASTOLIC BLOOD PRESSURE: 91 MMHG | HEIGHT: 64 IN | HEART RATE: 98 BPM | SYSTOLIC BLOOD PRESSURE: 129 MMHG | RESPIRATION RATE: 16 BRPM | TEMPERATURE: 100 F | WEIGHT: 205 LBS | OXYGEN SATURATION: 98 % | BODY MASS INDEX: 35 KG/M2

## 2020-11-14 DIAGNOSIS — B34.9 VIRAL SYNDROME: ICD-10-CM

## 2020-11-14 PROCEDURE — 700102 HCHG RX REV CODE 250 W/ 637 OVERRIDE(OP): Performed by: EMERGENCY MEDICINE

## 2020-11-14 PROCEDURE — A9270 NON-COVERED ITEM OR SERVICE: HCPCS | Performed by: EMERGENCY MEDICINE

## 2020-11-14 PROCEDURE — 99282 EMERGENCY DEPT VISIT SF MDM: CPT

## 2020-11-14 RX ORDER — ACETAMINOPHEN 325 MG/1
650 TABLET ORAL ONCE
Status: COMPLETED | OUTPATIENT
Start: 2020-11-14 | End: 2020-11-14

## 2020-11-14 RX ADMIN — ACETAMINOPHEN 650 MG: 325 TABLET, FILM COATED ORAL at 20:32

## 2020-11-14 ASSESSMENT — FIBROSIS 4 INDEX: FIB4 SCORE: 0.48

## 2020-11-15 NOTE — ED TRIAGE NOTES
"Dee Schreiber   30 y.o. female   Chief Complaint   Patient presents with   • Chills     off and on all day, no thermometer at home, 100.4F oral here   • Body Aches     all day, recent COVID test last friday at work, was told that she is negative      Pt amb to triage with steady gait for above complaint.      Pt is alert and oriented, speaking in full sentences, follows commands and responds appropriately to questions. Resp are even and unlabored.     /94   Pulse (!) 110   Temp 38 °C (100.4 °F) (Oral)   Resp 17   Ht 1.626 m (5' 4\")   Wt 93 kg (205 lb)   SpO2 97%   BMI 35.19 kg/m²       "

## 2020-11-15 NOTE — ED PROVIDER NOTES
ED Provider Note    CHIEF COMPLAINT  Chief Complaint   Patient presents with   • Chills     off and on all day, no thermometer at home, 100.4F oral here   • Body Aches     all day, recent COVID test last friday at work, was told that she is negative       HPI  Dee Schreiber is a 30 y.o. female who presents with myalgias, chills, and fevers.  The patient states has been sick since yesterday.  She was not feeling well in the morning and called into work and went and got a Covid test.  Covid test was negative per the patient.  She states she has generalized malaise as well.  She has not had any vomiting or diarrhea.  She does not have any increased work of breathing.    REVIEW OF SYSTEMS  See HPI for further details. All other systems are negative.     PAST MEDICAL HISTORY  Past Medical History:   Diagnosis Date   • PCOS (polycystic ovarian syndrome)    • UTI (lower urinary tract infection)        FAMILY HISTORY  [unfilled]    SOCIAL HISTORY  Social History     Socioeconomic History   • Marital status:      Spouse name: Not on file   • Number of children: Not on file   • Years of education: Not on file   • Highest education level: Not on file   Occupational History   • Not on file   Social Needs   • Financial resource strain: Not on file   • Food insecurity     Worry: Not on file     Inability: Not on file   • Transportation needs     Medical: Not on file     Non-medical: Not on file   Tobacco Use   • Smoking status: Former Smoker     Packs/day: 0.00   • Smokeless tobacco: Never Used   Substance and Sexual Activity   • Alcohol use: Yes     Frequency: Monthly or less     Drinks per session: 1 or 2   • Drug use: No   • Sexual activity: Never     Comment: pt unsre pt is breast/bottle feeding    Lifestyle   • Physical activity     Days per week: Not on file     Minutes per session: Not on file   • Stress: Not on file   Relationships   • Social connections     Talks on phone: Not on file     Gets together:  "Not on file     Attends [employer] service: Not on file     Active member of club or organization: Not on file     Attends meetings of clubs or organizations: Not on file     Relationship status: Not on file   • Intimate partner violence     Fear of current or ex partner: Not on file     Emotionally abused: Not on file     Physically abused: Not on file     Forced sexual activity: Not on file   Other Topics Concern   • Not on file   Social History Narrative   • Not on file       SURGICAL HISTORY  Past Surgical History:   Procedure Laterality Date   • PB LAP,CHOLECYSTECTOMY  2011   • CHOLECYSTECTOMY  2011   • GYN SURGERY      D+C       CURRENT MEDICATIONS  Home Medications     Reviewed by Devon Taylor R.N. (Registered Nurse) on 11/14/20 at 2003  Med List Status: Partial   Medication Last Dose Status   metFORMIN ER (GLUCOPHAGE XR) 500 MG TABLET SR 24 HR  Active   spironolactone (ALDACTONE) 100 MG Tab  Active   therapeutic multivitamin-minerals (THERAGRAN-M) Tab  Active                ALLERGIES  Allergies   Allergen Reactions   • Nkda [No Known Drug Allergy]        PHYSICAL EXAM  VITAL SIGNS: /94   Pulse (!) 110   Temp 38 °C (100.4 °F) (Oral)   Resp 17   Ht 1.626 m (5' 4\")   Wt 93 kg (205 lb)   SpO2 97%   BMI 35.19 kg/m²       Constitutional: Patient appears ill but nontoxic  HENT: Normocephalic, Atraumatic, Bilateral external ears normal, Oropharynx moist, No oral exudates, Nose swollen turbinates with rhinorrhea.   Eyes: PERRLA, EOMI, Conjunctiva normal, No discharge.   Neck: Normal range of motion, No tenderness, Supple, No stridor.   Lymphatic: No lymphadenopathy noted.   Cardiovascular: Slightly tachycardic heart rate, Normal rhythm, No murmurs, No rubs, No gallops.   Thorax & Lungs: Normal breath sounds, No respiratory distress, No wheezing, No chest tenderness.   Abdomen: Bowel sounds normal, Soft, No tenderness, No masses, No pulsatile masses.   Skin: Warm, Dry, No erythema, No rash. "   Back: No tenderness, No CVA tenderness.   Extremities: Intact distal pulses, No edema, No tenderness, No cyanosis, No clubbing.    Neurologic: Alert & oriented x 3, Normal motor function, Normal sensory function, No focal deficits noted.   Psychiatric: Affect normal, Judgment normal, Mood normal.     COURSE & MEDICAL DECISION MAKING  Pertinent Labs & Imaging studies reviewed. (See chart for details)  This is a 30-year-old female who presents with signs and symptoms consistent with a viral process.  Her symptoms are suspicious for COVID-19 despite her recent negative test.  This could also be from another type of virus such as influenza or adenovirus.  She does not appear toxic.  She is slightly tachycardic but I suspect this is from her fever as well as mild dehydration.  She has not had any vomiting.  She will receive Tylenol prior to discharge and she will receive instructions to return for increased work of breathing, persistent vomiting, or extreme weakness.  Clinically do not appreciate any evidence of a focal bacterial infection.    FINAL IMPRESSION  1.  Fever  2.  Suspect viral illness    Disposition  The patient will be discharged in stable condition         Electronically signed by: Dick Rivera M.D., 11/14/2020 8:25 PM

## 2021-01-15 ENCOUNTER — APPOINTMENT (OUTPATIENT)
Dept: RADIOLOGY | Facility: MEDICAL CENTER | Age: 31
End: 2021-01-15
Attending: EMERGENCY MEDICINE
Payer: COMMERCIAL

## 2021-01-15 ENCOUNTER — HOSPITAL ENCOUNTER (EMERGENCY)
Facility: MEDICAL CENTER | Age: 31
End: 2021-01-15
Attending: EMERGENCY MEDICINE
Payer: COMMERCIAL

## 2021-01-15 ENCOUNTER — APPOINTMENT (OUTPATIENT)
Dept: LAB | Facility: MEDICAL CENTER | Age: 31
End: 2021-01-15
Payer: COMMERCIAL

## 2021-01-15 VITALS
HEIGHT: 64 IN | OXYGEN SATURATION: 97 % | WEIGHT: 214.29 LBS | TEMPERATURE: 98.1 F | BODY MASS INDEX: 36.58 KG/M2 | RESPIRATION RATE: 20 BRPM | DIASTOLIC BLOOD PRESSURE: 55 MMHG | HEART RATE: 78 BPM | SYSTOLIC BLOOD PRESSURE: 92 MMHG

## 2021-01-15 DIAGNOSIS — O26.899 PELVIC PAIN DURING PREGNANCY: ICD-10-CM

## 2021-01-15 DIAGNOSIS — O41.8X10 SUBCHORIONIC HEMORRHAGE OF PLACENTA IN FIRST TRIMESTER, SINGLE OR UNSPECIFIED FETUS: ICD-10-CM

## 2021-01-15 DIAGNOSIS — Z3A.01 LESS THAN 8 WEEKS GESTATION OF PREGNANCY: ICD-10-CM

## 2021-01-15 DIAGNOSIS — O46.8X1 SUBCHORIONIC HEMORRHAGE OF PLACENTA IN FIRST TRIMESTER, SINGLE OR UNSPECIFIED FETUS: ICD-10-CM

## 2021-01-15 DIAGNOSIS — R10.2 PELVIC PAIN DURING PREGNANCY: ICD-10-CM

## 2021-01-15 LAB
ALBUMIN SERPL BCP-MCNC: 4.5 G/DL (ref 3.2–4.9)
ALBUMIN/GLOB SERPL: 1.7 G/DL
ALP SERPL-CCNC: 72 U/L (ref 30–99)
ALT SERPL-CCNC: 16 U/L (ref 2–50)
ANION GAP SERPL CALC-SCNC: 10 MMOL/L (ref 7–16)
APPEARANCE UR: CLEAR
AST SERPL-CCNC: 12 U/L (ref 12–45)
B-HCG SERPL-ACNC: 929 MIU/ML (ref 0–5)
BASOPHILS # BLD AUTO: 0.4 % (ref 0–1.8)
BASOPHILS # BLD: 0.05 K/UL (ref 0–0.12)
BILIRUB SERPL-MCNC: 0.3 MG/DL (ref 0.1–1.5)
BILIRUB UR QL STRIP.AUTO: NEGATIVE
BUN SERPL-MCNC: 9 MG/DL (ref 8–22)
CALCIUM SERPL-MCNC: 9.3 MG/DL (ref 8.5–10.5)
CHLORIDE SERPL-SCNC: 102 MMOL/L (ref 96–112)
CO2 SERPL-SCNC: 24 MMOL/L (ref 20–33)
COLOR UR: YELLOW
CREAT SERPL-MCNC: 0.64 MG/DL (ref 0.5–1.4)
EOSINOPHIL # BLD AUTO: 0.08 K/UL (ref 0–0.51)
EOSINOPHIL NFR BLD: 0.6 % (ref 0–6.9)
ERYTHROCYTE [DISTWIDTH] IN BLOOD BY AUTOMATED COUNT: 42.9 FL (ref 35.9–50)
GLOBULIN SER CALC-MCNC: 2.7 G/DL (ref 1.9–3.5)
GLUCOSE SERPL-MCNC: 79 MG/DL (ref 65–99)
GLUCOSE UR STRIP.AUTO-MCNC: NEGATIVE MG/DL
HCT VFR BLD AUTO: 41.5 % (ref 37–47)
HGB BLD-MCNC: 14.1 G/DL (ref 12–16)
IMM GRANULOCYTES # BLD AUTO: 0.08 K/UL (ref 0–0.11)
IMM GRANULOCYTES NFR BLD AUTO: 0.6 % (ref 0–0.9)
KETONES UR STRIP.AUTO-MCNC: NEGATIVE MG/DL
LEUKOCYTE ESTERASE UR QL STRIP.AUTO: NEGATIVE
LIPASE SERPL-CCNC: 20 U/L (ref 11–82)
LYMPHOCYTES # BLD AUTO: 3.8 K/UL (ref 1–4.8)
LYMPHOCYTES NFR BLD: 30.3 % (ref 22–41)
MCH RBC QN AUTO: 32 PG (ref 27–33)
MCHC RBC AUTO-ENTMCNC: 34 G/DL (ref 33.6–35)
MCV RBC AUTO: 94.1 FL (ref 81.4–97.8)
MICRO URNS: NORMAL
MONOCYTES # BLD AUTO: 1 K/UL (ref 0–0.85)
MONOCYTES NFR BLD AUTO: 8 % (ref 0–13.4)
NEUTROPHILS # BLD AUTO: 7.53 K/UL (ref 2–7.15)
NEUTROPHILS NFR BLD: 60.1 % (ref 44–72)
NITRITE UR QL STRIP.AUTO: NEGATIVE
NRBC # BLD AUTO: 0 K/UL
NRBC BLD-RTO: 0 /100 WBC
NUMBER OF RH DOSES IND 8505RD: NORMAL
PH UR STRIP.AUTO: 6 [PH] (ref 5–8)
PLATELET # BLD AUTO: 309 K/UL (ref 164–446)
PMV BLD AUTO: 9.3 FL (ref 9–12.9)
POTASSIUM SERPL-SCNC: 3.5 MMOL/L (ref 3.6–5.5)
PROT SERPL-MCNC: 7.2 G/DL (ref 6–8.2)
PROT UR QL STRIP: NEGATIVE MG/DL
RBC # BLD AUTO: 4.41 M/UL (ref 4.2–5.4)
RBC UR QL AUTO: NEGATIVE
RH BLD: NORMAL
SODIUM SERPL-SCNC: 136 MMOL/L (ref 135–145)
SP GR UR STRIP.AUTO: 1.01
UROBILINOGEN UR STRIP.AUTO-MCNC: 0.2 MG/DL
WBC # BLD AUTO: 12.5 K/UL (ref 4.8–10.8)

## 2021-01-15 PROCEDURE — 80053 COMPREHEN METABOLIC PANEL: CPT

## 2021-01-15 PROCEDURE — 84702 CHORIONIC GONADOTROPIN TEST: CPT

## 2021-01-15 PROCEDURE — 86901 BLOOD TYPING SEROLOGIC RH(D): CPT

## 2021-01-15 PROCEDURE — 85025 COMPLETE CBC W/AUTO DIFF WBC: CPT

## 2021-01-15 PROCEDURE — 83690 ASSAY OF LIPASE: CPT

## 2021-01-15 PROCEDURE — 99284 EMERGENCY DEPT VISIT MOD MDM: CPT

## 2021-01-15 PROCEDURE — 76801 OB US < 14 WKS SINGLE FETUS: CPT

## 2021-01-15 PROCEDURE — 81003 URINALYSIS AUTO W/O SCOPE: CPT

## 2021-01-15 ASSESSMENT — FIBROSIS 4 INDEX: FIB4 SCORE: 0.48

## 2021-01-15 ASSESSMENT — PAIN DESCRIPTION - PAIN TYPE
TYPE: ACUTE PAIN
TYPE: ACUTE PAIN

## 2021-01-16 NOTE — DISCHARGE INSTRUCTIONS
Your ultrasound shows a very early pregnancy, the measurements show a pregnancy of 4 weeks and 6 days with an estimated date of delivery on September 18, 2021.  There is a small subchorionic hemorrhage, this may cause some cramping and bleeding.  The subchorionic hemorrhage increases the risk of miscarriage, however many of these pregnancies progress perfectly normally.  Please call the [facility] women's center above to schedule a follow-up appointment for complete recheck.  If your pain does not improve over the next 48 hours please return to the emergency department for complete recheck.  Return immediately if you develop any new or worsening symptoms, this includes worsening pain, vaginal bleeding soaking more than 2 pads per hour, lightheadedness, fevers, nausea or vomiting, or if you have any further concerns.

## 2021-01-16 NOTE — ED NOTES
Patient d/c off unit in stable condition, education provided using teach back method. All questions and concerns addressed at this time.

## 2021-01-16 NOTE — ED NOTES
Rounded on pt: pt and spouse lying together on gurney. Denies pain, needs at this time. Updated to POC, apologies provided for wait time.

## 2021-01-16 NOTE — ED NOTES
Assumed patient care, bedside report received. Pt resting on gurney in NAD with SO at bedside. Amb to rr with steady gait. Updated to POC, needs met.

## 2021-01-16 NOTE — ED TRIAGE NOTES
"Chief Complaint   Patient presents with   • Abdominal Pain     Patient found out she was pregnant yesterday. Unsure of how far along but estimating \"a couple weeks to a month\". Patient experiencing lower left groin pain and cramping. Denies any vaginal bleeding.    • Low Back Pain     Patient has been having low back pain for one day as well.      /68   Pulse 87   Temp 36.7 °C (98.1 °F) (Temporal)   Resp 14   Ht 1.626 m (5' 4\")   Wt 97.2 kg (214 lb 4.6 oz)   LMP  (LMP Unknown)   SpO2 97%   BMI 36.78 kg/m²     Patient arrived to ED for the above complaint. Patient placed in lobby. Awaiting room placement.   "

## 2021-01-16 NOTE — ED PROVIDER NOTES
ED Provider Note    Chief Complaint:   Pelvic pain in early pregnancy.    HPI:  Dee Schreiber is a 30 y.o. female,  at 6 weeks and 2 days estimated gestational age by last menstrual period (LMP 2020) who presents for evaluation of pelvic pain in early pregnancy.  She took a home pregnancy test yesterday that was positive.  This morning she developed sharp pain localized to the left side of the pelvis, with associated abdominal cramping.  She reports some cramping pain that radiates towards the back.  She has not had any associated vaginal bleeding, she has had no associated vaginal discharge.  She has not had any associated fevers, no excessive nausea or vomiting.  She has not yet scheduled a prenatal visit, does not have a confirmed intrauterine pregnancy by ultrasound.  She has no lightheadedness, no shortness of breath.  She is unable to identify any exacerbating or alleviating factors.    Review of Systems:  See HPI for pertinent positives and negatives. All other systems negative.    Past Medical History:   has a past medical history of PCOS (polycystic ovarian syndrome) and UTI (lower urinary tract infection).    Social History:  Social History     Tobacco Use   • Smoking status: Former Smoker     Packs/day: 0.00   • Smokeless tobacco: Never Used   Substance and Sexual Activity   • Alcohol use: Yes     Frequency: Monthly or less     Drinks per session: 1 or 2   • Drug use: No   • Sexual activity: Never     Comment: pt unsre pt is breast/bottle feeding        Surgical History:   has a past surgical history that includes lap,cholecystectomy (); gyn surgery; and cholecystectomy ().    Current Medications:  Home Medications     Reviewed by Michela Daigle R.N. (Registered Nurse) on 01/15/21 at 1708  Med List Status: Not Addressed   Medication Last Dose Status   metFORMIN ER (GLUCOPHAGE XR) 500 MG TABLET SR 24 HR  Active   spironolactone (ALDACTONE) 100 MG Tab  Active  "  therapeutic multivitamin-minerals (THERAGRAN-M) Tab  Active                Allergies:  Allergies   Allergen Reactions   • Nkda [No Known Drug Allergy]        Physical Exam:  Vital Signs: BP (!) 99/54   Pulse 75   Temp 36.7 °C (98.1 °F) (Temporal)   Resp 14   Ht 1.626 m (5' 4\")   Wt 97.2 kg (214 lb 4.6 oz)   LMP  (LMP Unknown)   SpO2 96%   BMI 36.78 kg/m²   Constitutional: Alert, no acute distress  HENT: Normocephalic, mask in place  Eyes: Pupils equal and reactive, normal conjunctiva  Neck: Supple, normal range of motion, no stridor  Cardiovascular: Extremities are warm and well perfused  Pulmonary: No respiratory distress, normal work of breathing  Abdomen: Soft, non-distended, non-tender to palpation, no peritoneal signs, no significant left lower quadrant tenderness to palpation  Skin: Warm, dry, no rashes or lesions  Musculoskeletal: Normal range of motion in all extremities, no swelling or deformity noted  Neurologic: Alert, oriented, normal speech, normal motor function  Psychiatric: Normal and appropriate mood and affect    Medical records reviewed for continuity of care.  No recent visits for similar symptoms.    Labs:  Labs Reviewed   CBC WITH DIFFERENTIAL - Abnormal; Notable for the following components:       Result Value    WBC 12.5 (*)     Neutrophils (Absolute) 7.53 (*)     Monos (Absolute) 1.00 (*)     All other components within normal limits    Narrative:     Print Consent?->No   COMP METABOLIC PANEL - Abnormal; Notable for the following components:    Potassium 3.5 (*)     All other components within normal limits    Narrative:     Print Consent?->No   HCG QUANTITATIVE - Abnormal; Notable for the following components:    Bhcg 929.0 (*)     All other components within normal limits    Narrative:     Print Consent?->No   LIPASE    Narrative:     Print Consent?->No   URINALYSIS,CULTURE IF INDICATED    Narrative:     Indication for culture:->Pregnant women: fever and/or  asymptomatic " screening   RH TYPE FOR RHOGAM FROM E.D.    Narrative:     Print Consent?->No   ESTIMATED GFR    Narrative:     Print Consent?->No       Radiology:  US-OB 1ST TRIMESTER WITH TRANSVAGINAL (COMBO)   Final Result         1.  Early intrauterine pregnancy, no fetal pole identified, cannot assess for viability.   2.  Gestational sac is seen in the lower uterine segment suggesting a low implantation   3.  Small subchorionic hemorrhage   4.  Nabothian cyst           ED Medications Administered:  Medications - No data to display    Differential diagnosis:  subchorionic hemorrhage, ectopic pregnancy, threatened miscarriage, inevitable miscarriage, normal pregnancy, urinary tract infection    MDM:  Patient presents with left-sided pelvic pain that began this morning in the setting of a recent positive pregnancy test.  On arrival to the emergency department she is not having any active vaginal bleeding, heart rate is normal, she has no hypotension, no evidence of brisk hemorrhage or hemorrhagic shock.  She is afebrile, less concerning for infectious etiology.  She has no risk factors for heterotopic pregnancy.    On laboratory evaluation, white blood count is slightly elevated to 12.5, she has no left shift.  No bands resulted at this time.  Quantitative beta-hCG is 929, she is Rh+, no indication for RhoGam.  CMP is entirely within normal limits, lipase is within normal limits.  Urinalysis is negative for evidence of infection.    Patient has her ultrasound demonstrates early intrauterine pregnancy, no fetal pole identified, unable to assess for viability.  Small subchorionic hemorrhage noted.  Gestational sac shape is within normal limits.  Sonographic age of 4 weeks 6 days with estimated date of delivery September 18, 2021.    Plan at this time is for discharge home.  As it is Friday evening, with an upcoming holiday weekend, patient will not be able to schedule close follow-up with OB/GYN.  She is counseled to return to the  emergency department over the next 3 days if she develops any new or worsening symptoms, or if her symptoms do not improve.  If her symptoms improve, she can follow-up with the [facility] [place] on an outpatient basis, she is counseled to call at the opening of business hours. Return precautions were discussed with the patient, and provided in written form with the patient's discharge instructions.       Personal protective equipment including N95 surgical respirator, goggles, and gloves were used during this encounter.       Disposition:  Discharged home in stable condition    Final Impression:  1. Less than 8 weeks gestation of pregnancy    2. Pelvic pain during pregnancy    3. Subchorionic hemorrhage of placenta in first trimester, single or unspecified fetus        Electronically signed by: Sarah Arriola MD, 1/15/2021 9:48 PM

## 2021-02-21 ENCOUNTER — HOSPITAL ENCOUNTER (OUTPATIENT)
Facility: MEDICAL CENTER | Age: 31
End: 2021-02-21
Attending: NURSE PRACTITIONER
Payer: COMMERCIAL

## 2021-02-21 ENCOUNTER — OFFICE VISIT (OUTPATIENT)
Dept: URGENT CARE | Facility: PHYSICIAN GROUP | Age: 31
End: 2021-02-21
Payer: COMMERCIAL

## 2021-02-21 VITALS
TEMPERATURE: 98.4 F | OXYGEN SATURATION: 100 % | RESPIRATION RATE: 16 BRPM | WEIGHT: 204 LBS | HEIGHT: 64 IN | BODY MASS INDEX: 34.83 KG/M2 | HEART RATE: 74 BPM

## 2021-02-21 DIAGNOSIS — J02.9 SORE THROAT: ICD-10-CM

## 2021-02-21 DIAGNOSIS — J03.90 EXUDATIVE TONSILLITIS: ICD-10-CM

## 2021-02-21 LAB
INT CON NEG: NEGATIVE
INT CON POS: POSITIVE
S PYO AG THROAT QL: NORMAL

## 2021-02-21 PROCEDURE — 87070 CULTURE OTHR SPECIMN AEROBIC: CPT

## 2021-02-21 PROCEDURE — 87880 STREP A ASSAY W/OPTIC: CPT | Performed by: NURSE PRACTITIONER

## 2021-02-21 PROCEDURE — 99213 OFFICE O/P EST LOW 20 MIN: CPT | Performed by: NURSE PRACTITIONER

## 2021-02-21 RX ORDER — NORETHINDRONE ACETATE AND ETHINYL ESTRADIOL, ETHINYL ESTRADIOL AND FERROUS FUMARATE 1MG-10(24)
KIT ORAL
COMMUNITY
End: 2022-05-02

## 2021-02-21 RX ORDER — AMOXICILLIN 500 MG/1
1000 CAPSULE ORAL DAILY
Qty: 20 CAPSULE | Refills: 0 | Status: SHIPPED | OUTPATIENT
Start: 2021-02-21 | End: 2021-03-03

## 2021-02-21 RX ORDER — DIPHENHYDRAMINE HYDROCHLORIDE AND LIDOCAINE HYDROCHLORIDE AND ALUMINUM HYDROXIDE AND MAGNESIUM HYDRO
5 KIT EVERY 6 HOURS PRN
Qty: 100 ML | Refills: 0 | Status: SHIPPED | OUTPATIENT
Start: 2021-02-21 | End: 2021-02-26

## 2021-02-21 RX ORDER — PHENTERMINE HYDROCHLORIDE 37.5 MG/1
37.5 CAPSULE ORAL EVERY MORNING
COMMUNITY
End: 2021-09-14

## 2021-02-21 ASSESSMENT — ENCOUNTER SYMPTOMS
CONSTITUTIONAL NEGATIVE: 1
SHORTNESS OF BREATH: 0
COUGH: 0
FEVER: 0
SORE THROAT: 1
RESPIRATORY NEGATIVE: 1
SWOLLEN GLANDS: 1

## 2021-02-21 ASSESSMENT — VISUAL ACUITY: OU: 1

## 2021-02-21 ASSESSMENT — FIBROSIS 4 INDEX: FIB4 SCORE: 0.3

## 2021-02-22 DIAGNOSIS — J03.90 EXUDATIVE TONSILLITIS: ICD-10-CM

## 2021-02-22 NOTE — PROGRESS NOTES
Subjective:     Dee Schreiber is a 30 y.o. female who presents for Sore Throat (sore throat x1 day)       Pharyngitis   This is a new problem. The current episode started yesterday. The problem has been gradually worsening. There has been no fever. Associated symptoms include swollen glands. Pertinent negatives include no coughing or shortness of breath.     Patient was screened prior to rooming and denied COVID-19 diagnosis or contact with a person who has been diagnosed or is suspected to have COVID-19. During this visit, appropriate PPE was worn, hand hygiene was performed, and the patient and any visitors were masked.     PMH:  has a past medical history of PCOS (polycystic ovarian syndrome) and UTI (lower urinary tract infection).    MEDS:   Current Outpatient Medications:   •  phentermine 37.5 MG capsule, Take 37.5 mg by mouth every morning., Disp: , Rfl:   •  Norethin-Eth Estrad-Fe Biphas (LO LOESTRIN FE) 1 MG-10 MCG / 10 MCG Tab, Take  by mouth., Disp: , Rfl:   •  amoxicillin (AMOXIL) 500 MG Cap, Take 2 Capsules by mouth every day for 10 days., Disp: 20 capsule, Rfl: 0  •  DPH-Lido-AlHydr-MgHydr-Simeth (MAGIC MOUTHWASH BLM) Suspension, Take 5 mL by mouth every 6 hours as needed for up to 5 days. Swish well, then spit out or swallow., Disp: 100 mL, Rfl: 0  •  spironolactone (ALDACTONE) 100 MG Tab, Take 1 Tab by mouth 2 times a day. (Patient not taking: Reported on 2/21/2021), Disp: 180 Tab, Rfl: 3  •  metFORMIN ER (GLUCOPHAGE XR) 500 MG TABLET SR 24 HR, Take 1 Tab by mouth 2 times a day. Generic ER metformin is fine. (Patient not taking: Reported on 2/21/2021), Disp: 180 Tab, Rfl: 3  •  therapeutic multivitamin-minerals (THERAGRAN-M) Tab, Take 1 Tab by mouth every day., Disp: , Rfl:     ALLERGIES:   Allergies   Allergen Reactions   • Nkda [No Known Drug Allergy]      SURGHX:   Past Surgical History:   Procedure Laterality Date   • PB LAP,CHOLECYSTECTOMY  2011   • CHOLECYSTECTOMY  2011   • GYN  "SURGERY      D+C     SOCHX:  reports that she has quit smoking. She smoked 0.00 packs per day. She has never used smokeless tobacco. She reports current alcohol use. She reports that she does not use drugs.     FH: Reviewed with patient, not pertinent to this visit.    Review of Systems   Constitutional: Negative.  Negative for fever.   HENT: Positive for sore throat.    Respiratory: Negative.  Negative for cough and shortness of breath.    All other systems reviewed and are negative.    Additional details per HPI.      Objective:     Pulse 74   Temp 36.9 °C (98.4 °F) (Temporal)   Resp 16   Ht 1.626 m (5' 4\")   Wt 92.5 kg (204 lb)   LMP  (LMP Unknown)   SpO2 100%   BMI 35.02 kg/m²     Physical Exam  Vitals reviewed.   Constitutional:       General: She is not in acute distress.     Appearance: She is well-developed. She is not ill-appearing or toxic-appearing.   HENT:      Head: Normocephalic.      Right Ear: External ear normal.      Left Ear: External ear normal.      Nose: Nose normal.      Mouth/Throat:      Mouth: Mucous membranes are moist.      Pharynx: Uvula midline. Pharyngeal swelling and posterior oropharyngeal erythema present.      Tonsils: Tonsillar exudate present. 2+ on the right. 2+ on the left.   Eyes:      General: Vision grossly intact.      Extraocular Movements: Extraocular movements intact.      Conjunctiva/sclera: Conjunctivae normal.   Cardiovascular:      Rate and Rhythm: Normal rate.   Pulmonary:      Effort: Pulmonary effort is normal. No respiratory distress.   Musculoskeletal:         General: No deformity. Normal range of motion.      Cervical back: Normal range of motion.   Skin:     General: Skin is warm and dry.      Coloration: Skin is not pale.   Neurological:      Mental Status: She is alert and oriented to person, place, and time.      Sensory: No sensory deficit.      Motor: No weakness.      Coordination: Coordination normal.   Psychiatric:         Behavior: Behavior " normal. Behavior is cooperative.     Rapid Strep A swab: negative      Assessment/Plan:     1. Exudative tonsillitis  CULTURE THROAT    amoxicillin (AMOXIL) 500 MG Cap   2. Sore throat  DPH-Lido-AlHydr-MgHydr-Simeth (MAGIC MOUTHWASH BLM) Suspension    POCT Rapid Strep A     Rx as above. Throat culture pending.    Differential diagnosis, natural history, supportive care, rest, fluids, gargles, over-the-counter symptom management per 's instructions, acetaminophen, ibuprofen, Cepacol, close monitoring, and indications for immediate follow-up discussed.     Patient advised to: Return for 1) Symptoms that worsen/don't improve, or go to ER, 2) Follow up with primary care in 7-10 days.    All questions answered. Patient agrees with the plan of care.    Discharge summary provided.

## 2021-02-22 NOTE — PATIENT INSTRUCTIONS
Tonsillitis    Tonsillitis is an infection of the throat that causes the tonsils to become red, tender, and swollen. Tonsils are tissues in the back of your throat. Each tonsil has crevices (crypts). Tonsils normally work to protect the body from infection.  What are the causes?  Sudden (acute) tonsillitis may be caused by a virus or bacteria, including streptococcal bacteria. Long-lasting (chronic) tonsillitis occurs when the crypts of the tonsils become filled with pieces of food and bacteria, which makes it easy for the tonsils to become repeatedly infected.  Tonsillitis can be spread from person to person (is contagious). It may be spread by inhaling droplets that are released with coughing or sneezing. You may also come into contact with viruses or bacteria on surfaces, such as cups or utensils.  What are the signs or symptoms?  Symptoms of this condition include:  · A sore throat. This may include trouble swallowing.  · White patches on the tonsils.  · Swollen tonsils.  · Fever.  · Headache.  · Tiredness.  · Loss of appetite.  · Snoring during sleep when you did not snore before.  · Small, foul-smelling, yellowish-white pieces of material (tonsilloliths) that you occasionally cough up or spit out. These can cause you to have bad breath.  How is this diagnosed?  This condition is diagnosed with a physical exam. Diagnosis can be confirmed with the results of lab tests, including a throat culture.  How is this treated?  Treatment for this condition depends on the cause, but usually focuses on treating the symptoms associated with it. Treatment may include:  · Medicines to relieve pain and manage fever.  · Steroid medicines to reduce swelling.  · Antibiotic medicines if the condition is caused by bacteria.  If attacks of tonsillitis are severe and frequent, your health care provider may recommend surgery to remove the tonsils (tonsillectomy).  Follow these instructions at home:  Medicines  · Take over-the-counter  and prescription medicines only as told by your health care provider.  · If you were prescribed an antibiotic medicine, take it as told by your health care provider. Do not stop taking the antibiotic even if you start to feel better.  Eating and drinking  · Drink enough fluid to keep your urine clear or pale yellow.  · While your throat is sore, eat soft or liquid foods, such as sherbet, soups, or instant breakfast drinks.  · Drink warm liquids.  · Eat frozen ice pops.  General instructions  · Rest as much as possible and get plenty of sleep.  · Gargle with a salt-water mixture 3-4 times a day or as needed. To make a salt-water mixture, completely dissolve ½-1 tsp of salt in 1 cup of warm water.  · Wash your hands regularly with soap and water. If soap and water are not available, use hand .  · Do not share cups, bottles, or other utensils until your symptoms have gone away.  · Do not smoke. This can help your symptoms and prevent the infection from coming back. If you need help quitting, ask your health care provider.  · Keep all follow-up visits as told by your health care provider. This is important.  Contact a health care provider if:  · You notice large, tender lumps in your neck that were not there before.  · You have a fever that does not go away after 2-3 days.  · You develop a rash.  · You cough up a green, yellow-brown, or bloody substance.  · You cannot swallow liquids or food for 24 hours.  · Only one of your tonsils is swollen.  Get help right away if:  · You develop any new symptoms, such as vomiting, severe headache, stiff neck, chest pain, trouble breathing, or trouble swallowing.  · You have severe throat pain along with drooling or voice changes.  · You have severe pain that is not controlled with medicines.  · You cannot fully open your mouth.  · You develop redness, swelling, or severe pain anywhere in your neck.  Summary  · Tonsillitis is an infection of the throat that causes the  tonsils to become red, tender, and swollen.  · Tonsillitis may be caused by a virus or bacteria.  · Rest as much as possible. Get plenty of sleep.  This information is not intended to replace advice given to you by your health care provider. Make sure you discuss any questions you have with your health care provider.  Document Released: 09/27/2006 Document Revised: 11/30/2018 Document Reviewed: 01/23/2018  [employer] Patient Education © 2020 [employer]

## 2021-02-24 ENCOUNTER — OFFICE VISIT (OUTPATIENT)
Dept: URGENT CARE | Facility: PHYSICIAN GROUP | Age: 31
End: 2021-02-24
Payer: COMMERCIAL

## 2021-02-24 VITALS
SYSTOLIC BLOOD PRESSURE: 114 MMHG | OXYGEN SATURATION: 98 % | HEIGHT: 64 IN | WEIGHT: 204 LBS | HEART RATE: 97 BPM | DIASTOLIC BLOOD PRESSURE: 64 MMHG | BODY MASS INDEX: 34.83 KG/M2 | RESPIRATION RATE: 14 BRPM | TEMPERATURE: 97.9 F

## 2021-02-24 DIAGNOSIS — J03.90 TONSILLITIS: ICD-10-CM

## 2021-02-24 LAB
BACTERIA SPEC RESP CULT: NORMAL
HETEROPH AB SER QL LA: NEGATIVE
INT CON NEG: NEGATIVE
INT CON POS: POSITIVE
SIGNIFICANT IND 70042: NORMAL
SITE SITE: NORMAL
SOURCE SOURCE: NORMAL

## 2021-02-24 PROCEDURE — 99214 OFFICE O/P EST MOD 30 MIN: CPT | Performed by: PHYSICIAN ASSISTANT

## 2021-02-24 PROCEDURE — 86308 HETEROPHILE ANTIBODY SCREEN: CPT | Performed by: PHYSICIAN ASSISTANT

## 2021-02-24 RX ORDER — METHYLPREDNISOLONE 4 MG/1
TABLET ORAL
Qty: 1 EACH | Refills: 0 | Status: SHIPPED | OUTPATIENT
Start: 2021-02-24 | End: 2021-09-14

## 2021-02-24 ASSESSMENT — ENCOUNTER SYMPTOMS
ABDOMINAL PAIN: 0
EYE DISCHARGE: 0
MUSCULOSKELETAL NEGATIVE: 1
DIARRHEA: 0
FEVER: 0
NAUSEA: 0
DIZZINESS: 0
NECK PAIN: 0
EYE REDNESS: 0
SHORTNESS OF BREATH: 0
COUGH: 0
CHILLS: 0
TROUBLE SWALLOWING: 1
SORE THROAT: 1
SWOLLEN GLANDS: 0
VOMITING: 0

## 2021-02-24 ASSESSMENT — PAIN SCALES - GENERAL: PAINLEVEL: NO PAIN

## 2021-02-24 ASSESSMENT — FIBROSIS 4 INDEX: FIB4 SCORE: 0.3

## 2021-02-24 NOTE — PROGRESS NOTES
Subjective:      Dee Schreiber is a 30 y.o. female who presents with Pharyngitis (sx started saturday night. Pt states she went to the urgent care at [place] on Sunday. Strep came back negative. Pt was given abx to help with the sore throat and it hasn't help. )            Pharyngitis   This is a new problem. The current episode started in the past 7 days (5 days). The problem has been unchanged. Neither side of throat is experiencing more pain than the other. There has been no fever. The pain is at a severity of 4/10. The pain is moderate. Associated symptoms include trouble swallowing. Pertinent negatives include no abdominal pain, congestion, coughing, diarrhea, ear pain, plugged ear sensation, neck pain, shortness of breath, swollen glands or vomiting. She has had no exposure to strep. Treatments tried: antibiotics. The treatment provided no relief.     Patient presents to urgent care reporting a a sore throat x 5 days. She was seen in urgent care 3 days ago and tested negative for strep. Throat culture from that time was also negative. She was started on a course of amoxicillin, which she has been taking as instructed without significant relief. She denies fevers, chills, body aches, cough, chest pain, or SOB. No known sick contacts.       Review of Systems   Constitutional: Negative for chills and fever.   HENT: Positive for sore throat and trouble swallowing. Negative for congestion and ear pain.    Eyes: Negative for discharge and redness.   Respiratory: Negative for cough and shortness of breath.    Cardiovascular: Negative for chest pain.   Gastrointestinal: Negative for abdominal pain, diarrhea, nausea and vomiting.   Genitourinary: Negative.    Musculoskeletal: Negative.  Negative for neck pain.   Skin: Negative for rash.   Neurological: Negative for dizziness.        Objective:     /64 (BP Location: Left arm, Patient Position: Sitting, BP Cuff Size: Adult)   Pulse 97   Temp 36.6  "°C (97.9 °F) (Temporal)   Resp 14   Ht 1.626 m (5' 4\")   Wt 92.5 kg (204 lb)   LMP 11/16/2020   SpO2 98%   BMI 35.02 kg/m²      Physical Exam  Vitals and nursing note reviewed.   Constitutional:       General: She is not in acute distress.     Appearance: Normal appearance. She is well-developed. She is not diaphoretic.   HENT:      Head: Normocephalic and atraumatic.      Right Ear: External ear normal.      Left Ear: External ear normal.      Mouth/Throat:      Mouth: Mucous membranes are moist.      Pharynx: Uvula midline. No pharyngeal swelling, oropharyngeal exudate, posterior oropharyngeal erythema or uvula swelling.      Tonsils: Tonsillar exudate present. No tonsillar abscesses. 3+ on the right. 3+ on the left.   Eyes:      Conjunctiva/sclera: Conjunctivae normal.   Cardiovascular:      Rate and Rhythm: Normal rate and regular rhythm.      Heart sounds: Normal heart sounds. No murmur.   Pulmonary:      Effort: Pulmonary effort is normal.      Breath sounds: Normal breath sounds. No wheezing or rales.   Musculoskeletal:         General: Normal range of motion.      Cervical back: Normal range of motion.   Lymphadenopathy:      Cervical: No cervical adenopathy.   Skin:     General: Skin is warm and dry.   Neurological:      Mental Status: She is alert and oriented to person, place, and time.   Psychiatric:         Behavior: Behavior normal.          PMH:  has a past medical history of PCOS (polycystic ovarian syndrome) and UTI (lower urinary tract infection).  MEDS:   Current Outpatient Medications:   •  methylPREDNISolone (MEDROL DOSEPAK) 4 MG Tablet Therapy Pack, Take as directed, Disp: 1 Each, Rfl: 0  •  phentermine 37.5 MG capsule, Take 37.5 mg by mouth every morning., Disp: , Rfl:   •  Norethin-Eth Estrad-Fe Biphas (LO LOESTRIN FE) 1 MG-10 MCG / 10 MCG Tab, Take  by mouth., Disp: , Rfl:   •  amoxicillin (AMOXIL) 500 MG Cap, Take 2 Capsules by mouth every day for 10 days., Disp: 20 capsule, Rfl: 0  • "  DPH-Lido-AlHydr-MgHydr-Simeth (MAGIC MOUTHWASH BLM) Suspension, Take 5 mL by mouth every 6 hours as needed for up to 5 days. Swish well, then spit out or swallow. (Patient not taking: Reported on 2/24/2021), Disp: 100 mL, Rfl: 0  •  spironolactone (ALDACTONE) 100 MG Tab, Take 1 Tab by mouth 2 times a day. (Patient not taking: Reported on 2/21/2021), Disp: 180 Tab, Rfl: 3  •  metFORMIN ER (GLUCOPHAGE XR) 500 MG TABLET SR 24 HR, Take 1 Tab by mouth 2 times a day. Generic ER metformin is fine. (Patient not taking: Reported on 2/21/2021), Disp: 180 Tab, Rfl: 3  •  therapeutic multivitamin-minerals (THERAGRAN-M) Tab, Take 1 Tab by mouth every day., Disp: , Rfl:   ALLERGIES:   Allergies   Allergen Reactions   • Nkda [No Known Drug Allergy]      SURGHX:   Past Surgical History:   Procedure Laterality Date   • PB LAP,CHOLECYSTECTOMY  2011   • CHOLECYSTECTOMY  2011   • GYN SURGERY      D+C     SOCHX:  reports that she has quit smoking. She smoked 0.00 packs per day. She has never used smokeless tobacco. She reports current alcohol use. She reports that she does not use drugs.  FH: family history includes Cancer in her maternal aunt.       Assessment/Plan:        1. Tonsillitis    - POCT Mononucleosis (mono): NEGATIVE   - methylPREDNISolone (MEDROL DOSEPAK) 4 MG Tablet Therapy Pack; Take as directed  Dispense: 1 Each; Refill: 0      Advised patient symptoms are likely viral in etiology. Encouraged warm salt water garlges and magic mouthwash as needed for symptomatic relief. Stop taking current course of amoxicillin. Call or return to office if symptoms persist or worsen. The patient demonstrated a good understanding and agreed with the treatment plan.

## 2021-02-24 NOTE — LETTER
February 24, 2021         Patient: Dee Schreiber   YOB: 1990   Date of Visit: 2/24/2021           To Whom it May Concern:    Dee Schreiber was seen in my clinic on 2/24/2021.     If you have any questions or concerns, please don't hesitate to call.        Sincerely,           Michela Judd P.A.-C.  Electronically Signed

## 2021-04-20 ENCOUNTER — HOSPITAL ENCOUNTER (OUTPATIENT)
Facility: MEDICAL CENTER | Age: 31
End: 2021-04-20
Attending: SURGERY
Payer: COMMERCIAL

## 2021-04-20 LAB
AMBIGUOUS DTTM AMBI4: NORMAL
COVID ORDER STATUS COVID19: NORMAL
SARS-COV-2 RNA RESP QL NAA+PROBE: NOTDETECTED
SPECIMEN SOURCE: NORMAL

## 2021-04-20 PROCEDURE — U0003 INFECTIOUS AGENT DETECTION BY NUCLEIC ACID (DNA OR RNA); SEVERE ACUTE RESPIRATORY SYNDROME CORONAVIRUS 2 (SARS-COV-2) (CORONAVIRUS DISEASE [COVID-19]), AMPLIFIED PROBE TECHNIQUE, MAKING USE OF HIGH THROUGHPUT TECHNOLOGIES AS DESCRIBED BY CMS-2020-01-R: HCPCS

## 2021-04-20 PROCEDURE — U0005 INFEC AGEN DETEC AMPLI PROBE: HCPCS

## 2021-05-14 ENCOUNTER — HOSPITAL ENCOUNTER (OUTPATIENT)
Facility: MEDICAL CENTER | Age: 31
End: 2021-05-14
Attending: OBSTETRICS & GYNECOLOGY
Payer: COMMERCIAL

## 2021-05-14 PROCEDURE — 87591 N.GONORRHOEAE DNA AMP PROB: CPT

## 2021-05-14 PROCEDURE — 87624 HPV HI-RISK TYP POOLED RSLT: CPT

## 2021-05-14 PROCEDURE — 88175 CYTOPATH C/V AUTO FLUID REDO: CPT

## 2021-05-14 PROCEDURE — 87661 TRICHOMONAS VAGINALIS AMPLIF: CPT

## 2021-05-14 PROCEDURE — 87491 CHLMYD TRACH DNA AMP PROBE: CPT

## 2021-05-17 LAB
C TRACH DNA GENITAL QL NAA+PROBE: NEGATIVE
CYTOLOGY REG CYTOL: NORMAL
HPV HR 12 DNA CVX QL NAA+PROBE: NEGATIVE
HPV16 DNA SPEC QL NAA+PROBE: NEGATIVE
HPV18 DNA SPEC QL NAA+PROBE: NEGATIVE
N GONORRHOEA DNA GENITAL QL NAA+PROBE: NEGATIVE
SPECIMEN SOURCE: NORMAL
SPECIMEN SOURCE: NORMAL

## 2021-05-18 LAB
SPEC CONTAINER SPEC: NORMAL
SPECIMEN SOURCE: NORMAL
T VAGINALIS RRNA SPEC QL NAA+PROBE: NEGATIVE

## 2021-05-26 ENCOUNTER — HOSPITAL ENCOUNTER (OUTPATIENT)
Facility: MEDICAL CENTER | Age: 31
End: 2021-05-26
Attending: SURGERY
Payer: COMMERCIAL

## 2021-05-26 PROCEDURE — 88305 TISSUE EXAM BY PATHOLOGIST: CPT

## 2021-05-26 PROCEDURE — 88342 IMHCHEM/IMCYTCHM 1ST ANTB: CPT

## 2021-05-26 PROCEDURE — 88341 IMHCHEM/IMCYTCHM EA ADD ANTB: CPT | Mod: 91

## 2021-05-27 LAB — PATHOLOGY CONSULT NOTE: NORMAL

## 2021-07-24 ENCOUNTER — TELEMEDICINE (OUTPATIENT)
Dept: TELEHEALTH | Facility: TELEMEDICINE | Age: 31
End: 2021-07-24
Payer: COMMERCIAL

## 2021-07-24 DIAGNOSIS — R05.9 COUGH: ICD-10-CM

## 2021-07-24 PROCEDURE — 99213 OFFICE O/P EST LOW 20 MIN: CPT | Mod: 95 | Performed by: PHYSICIAN ASSISTANT

## 2021-07-24 RX ORDER — CODEINE PHOSPHATE AND GUAIFENESIN 10; 100 MG/5ML; MG/5ML
10 SOLUTION ORAL EVERY 6 HOURS PRN
Qty: 118 ML | Refills: 0 | Status: SHIPPED | OUTPATIENT
Start: 2021-07-24 | End: 2021-07-31

## 2021-07-24 RX ORDER — ALBUTEROL SULFATE 90 UG/1
2 AEROSOL, METERED RESPIRATORY (INHALATION) EVERY 4 HOURS PRN
Qty: 8.5 G | Refills: 0 | Status: SHIPPED | OUTPATIENT
Start: 2021-07-24 | End: 2022-07-05

## 2021-07-24 NOTE — PROGRESS NOTES
Telemedicine Visit: Established Patient     This evaluation was conducted via Zoom, using secure and encrypted videoconferencing technology.  The patient was physical located at Home in [place], [place] and the physician was located in [facility].  The patient was presented by self, at home.  The patient's identity was confirmed and verbal consent for the telemedicine encounter was obtained.      Subjective:   HPI:    Dee Schreiber is a 30 y.o. female presenting for evaluation and management of cough for 3 days.  Productive cough without SOB or fever.      ROS   Constitutional: No fever, fatigue, chills or malaise.  HENT: No headache, otalgia, sinus congestion, or sore throat.  Eyes: No change in vision, eye pain, drainage, or redness.  Cardiovascular: No palpitations    Pulmonary/Chest: Cough  No SOB, or chest pain.  Musculoskeletal: No myalgia   Neurological: No loss of consciousness  Skin: No rash or itching  Psychiatric: No anxiety or insomnia       Allergies   Allergen Reactions   • Nkda [No Known Drug Allergy]        Current medicines (including changes today)  Current Outpatient Medications   Medication Sig Dispense Refill   • guaifenesin-codeine (CHERATUSSIN AC) Solution oral solution Take 10 mL by mouth every 6 hours as needed for Cough for up to 7 days. 118 mL 0   • albuterol 108 (90 Base) MCG/ACT Aero Soln inhalation aerosol Inhale 2 Puffs every four hours as needed for Shortness of Breath. 8.5 g 0   • methylPREDNISolone (MEDROL DOSEPAK) 4 MG Tablet Therapy Pack Take as directed 1 Each 0   • phentermine 37.5 MG capsule Take 37.5 mg by mouth every morning.     • Norethin-Eth Estrad-Fe Biphas (LO LOESTRIN FE) 1 MG-10 MCG / 10 MCG Tab Take  by mouth.     • spironolactone (ALDACTONE) 100 MG Tab Take 1 Tab by mouth 2 times a day. (Patient not taking: Reported on 2/21/2021) 180 Tab 3   • metFORMIN ER (GLUCOPHAGE XR) 500 MG TABLET SR 24 HR Take 1 Tab by mouth 2 times a day. Generic ER metformin is  fine. (Patient not taking: Reported on 2/21/2021) 180 Tab 3   • therapeutic multivitamin-minerals (THERAGRAN-M) Tab Take 1 Tab by mouth every day.       No current facility-administered medications for this visit.       There are no problems to display for this patient.      Family History   Problem Relation Age of Onset   • Cancer Maternal Aunt         breast       She  has a past medical history of PCOS (polycystic ovarian syndrome) and UTI (lower urinary tract infection).  She  has a past surgical history that includes pr lap,cholecystectomy (2011); gyn surgery; and cholecystectomy (2011).    Medications, Allergies, and current problem list reviewed today in Epic       Objective:   Last Menstrual Period  (LMP Unknown)     Physical Exam:  Constitutional: Alert, no distress, well-groomed.  Skin: No rashes in visible areas.  Eye: Round. Conjunctiva clear, lids normal. No icterus.   ENMT: Lips pink without lesions, good dentition, moist mucous membranes. Phonation normal.    Neck: No masses, no thyromegaly. Moves freely without pain.  CV: Pulse as reported by patient  Respiratory: Unlabored respiratory effort, no cough or audible wheeze  Psych: Alert and oriented x3, normal affect and mood.       Assessment and Plan:   The following treatment plan was discussed:     1. Cough  - guaifenesin-codeine (CHERATUSSIN AC) Solution oral solution; Take 10 mL by mouth every 6 hours as needed for Cough for up to 7 days.  Dispense: 118 mL; Refill: 0  - albuterol 108 (90 Base) MCG/ACT Aero Soln inhalation aerosol; Inhale 2 Puffs every four hours as needed for Shortness of Breath.  Dispense: 8.5 g; Refill: 0        Differential diagnosis, natural history, supportive care discussed. Follow-up with primary care provider within 7-10 days, emergency room precautions discussed.  Patient and/or family appears understanding of information.  Handout and review of patients diagnosis and treatment was discussed extensively.

## 2021-08-27 ENCOUNTER — TELEPHONE (OUTPATIENT)
Dept: SCHEDULING | Facility: IMAGING CENTER | Age: 31
End: 2021-08-27

## 2021-09-07 ENCOUNTER — HOSPITAL ENCOUNTER (OUTPATIENT)
Dept: LAB | Facility: MEDICAL CENTER | Age: 31
End: 2021-09-07
Attending: SURGERY
Payer: COMMERCIAL

## 2021-09-07 PROCEDURE — 86769 SARS-COV-2 COVID-19 ANTIBODY: CPT

## 2021-09-07 PROCEDURE — 36415 COLL VENOUS BLD VENIPUNCTURE: CPT

## 2021-09-08 SDOH — ECONOMIC STABILITY: HOUSING INSECURITY: IN THE LAST 12 MONTHS, HOW MANY PLACES HAVE YOU LIVED?: 2

## 2021-09-08 SDOH — ECONOMIC STABILITY: FOOD INSECURITY: WITHIN THE PAST 12 MONTHS, THE FOOD YOU BOUGHT JUST DIDN'T LAST AND YOU DIDN'T HAVE MONEY TO GET MORE.: NEVER TRUE

## 2021-09-08 SDOH — ECONOMIC STABILITY: FOOD INSECURITY: WITHIN THE PAST 12 MONTHS, YOU WORRIED THAT YOUR FOOD WOULD RUN OUT BEFORE YOU GOT MONEY TO BUY MORE.: NEVER TRUE

## 2021-09-08 SDOH — ECONOMIC STABILITY: TRANSPORTATION INSECURITY
IN THE PAST 12 MONTHS, HAS THE LACK OF TRANSPORTATION KEPT YOU FROM MEDICAL APPOINTMENTS OR FROM GETTING MEDICATIONS?: NO

## 2021-09-08 SDOH — ECONOMIC STABILITY: HOUSING INSECURITY
IN THE LAST 12 MONTHS, WAS THERE A TIME WHEN YOU DID NOT HAVE A STEADY PLACE TO SLEEP OR SLEPT IN A SHELTER (INCLUDING NOW)?: NO

## 2021-09-08 SDOH — ECONOMIC STABILITY: INCOME INSECURITY: IN THE LAST 12 MONTHS, WAS THERE A TIME WHEN YOU WERE NOT ABLE TO PAY THE MORTGAGE OR RENT ON TIME?: NO

## 2021-09-08 SDOH — ECONOMIC STABILITY: TRANSPORTATION INSECURITY
IN THE PAST 12 MONTHS, HAS LACK OF RELIABLE TRANSPORTATION KEPT YOU FROM MEDICAL APPOINTMENTS, MEETINGS, WORK OR FROM GETTING THINGS NEEDED FOR DAILY LIVING?: NO

## 2021-09-08 SDOH — ECONOMIC STABILITY: INCOME INSECURITY: HOW HARD IS IT FOR YOU TO PAY FOR THE VERY BASICS LIKE FOOD, HOUSING, MEDICAL CARE, AND HEATING?: NOT VERY HARD

## 2021-09-08 SDOH — HEALTH STABILITY: PHYSICAL HEALTH: ON AVERAGE, HOW MANY DAYS PER WEEK DO YOU ENGAGE IN MODERATE TO STRENUOUS EXERCISE (LIKE A BRISK WALK)?: 4 DAYS

## 2021-09-08 SDOH — HEALTH STABILITY: MENTAL HEALTH
STRESS IS WHEN SOMEONE FEELS TENSE, NERVOUS, ANXIOUS, OR CAN'T SLEEP AT NIGHT BECAUSE THEIR MIND IS TROUBLED. HOW STRESSED ARE YOU?: ONLY A LITTLE

## 2021-09-08 SDOH — ECONOMIC STABILITY: TRANSPORTATION INSECURITY
IN THE PAST 12 MONTHS, HAS LACK OF TRANSPORTATION KEPT YOU FROM MEETINGS, WORK, OR FROM GETTING THINGS NEEDED FOR DAILY LIVING?: NO

## 2021-09-08 SDOH — HEALTH STABILITY: PHYSICAL HEALTH: ON AVERAGE, HOW MANY MINUTES DO YOU ENGAGE IN EXERCISE AT THIS LEVEL?: 60 MIN

## 2021-09-08 ASSESSMENT — SOCIAL DETERMINANTS OF HEALTH (SDOH)
HOW OFTEN DO YOU ATTEND CHURCH OR RELIGIOUS SERVICES?: 1 TO 4 TIMES PER YEAR
IN A TYPICAL WEEK, HOW MANY TIMES DO YOU TALK ON THE PHONE WITH FAMILY, FRIENDS, OR NEIGHBORS?: MORE THAN THREE TIMES A WEEK
HOW OFTEN DO YOU GET TOGETHER WITH FRIENDS OR RELATIVES?: ONCE A WEEK
HOW OFTEN DO YOU ATTENT MEETINGS OF THE CLUB OR ORGANIZATION YOU BELONG TO?: NEVER
HOW MANY DRINKS CONTAINING ALCOHOL DO YOU HAVE ON A TYPICAL DAY WHEN YOU ARE DRINKING: 1 OR 2
IN A TYPICAL WEEK, HOW MANY TIMES DO YOU TALK ON THE PHONE WITH FAMILY, FRIENDS, OR NEIGHBORS?: MORE THAN THREE TIMES A WEEK
HOW HARD IS IT FOR YOU TO PAY FOR THE VERY BASICS LIKE FOOD, HOUSING, MEDICAL CARE, AND HEATING?: NOT VERY HARD
DO YOU BELONG TO ANY CLUBS OR ORGANIZATIONS SUCH AS CHURCH GROUPS UNIONS, FRATERNAL OR ATHLETIC GROUPS, OR SCHOOL GROUPS?: NO
HOW OFTEN DO YOU ATTENT MEETINGS OF THE CLUB OR ORGANIZATION YOU BELONG TO?: NEVER
HOW OFTEN DO YOU GET TOGETHER WITH FRIENDS OR RELATIVES?: ONCE A WEEK
HOW OFTEN DO YOU ATTEND CHURCH OR RELIGIOUS SERVICES?: 1 TO 4 TIMES PER YEAR
WITHIN THE PAST 12 MONTHS, YOU WORRIED THAT YOUR FOOD WOULD RUN OUT BEFORE YOU GOT THE MONEY TO BUY MORE: NEVER TRUE
DO YOU BELONG TO ANY CLUBS OR ORGANIZATIONS SUCH AS CHURCH GROUPS UNIONS, FRATERNAL OR ATHLETIC GROUPS, OR SCHOOL GROUPS?: NO
HOW OFTEN DO YOU HAVE A DRINK CONTAINING ALCOHOL: MONTHLY OR LESS
HOW OFTEN DO YOU HAVE SIX OR MORE DRINKS ON ONE OCCASION: NEVER

## 2021-09-08 ASSESSMENT — LIFESTYLE VARIABLES
HOW OFTEN DO YOU HAVE SIX OR MORE DRINKS ON ONE OCCASION: NEVER
HOW MANY STANDARD DRINKS CONTAINING ALCOHOL DO YOU HAVE ON A TYPICAL DAY: 1 OR 2
HOW OFTEN DO YOU HAVE A DRINK CONTAINING ALCOHOL: MONTHLY OR LESS

## 2021-09-09 LAB
SARS-COV-2 IGG SERPL IA-ACNC: <1 IV
SARS-COV-2 IGG SERPL QL IA: NEGATIVE

## 2021-09-13 ENCOUNTER — HOSPITAL ENCOUNTER (OUTPATIENT)
Facility: MEDICAL CENTER | Age: 31
End: 2021-09-13
Attending: SURGERY
Payer: COMMERCIAL

## 2021-09-13 LAB — COVID ORDER STATUS COVID19: NORMAL

## 2021-09-13 PROCEDURE — U0003 INFECTIOUS AGENT DETECTION BY NUCLEIC ACID (DNA OR RNA); SEVERE ACUTE RESPIRATORY SYNDROME CORONAVIRUS 2 (SARS-COV-2) (CORONAVIRUS DISEASE [COVID-19]), AMPLIFIED PROBE TECHNIQUE, MAKING USE OF HIGH THROUGHPUT TECHNOLOGIES AS DESCRIBED BY CMS-2020-01-R: HCPCS

## 2021-09-13 PROCEDURE — U0005 INFEC AGEN DETEC AMPLI PROBE: HCPCS

## 2021-09-14 ENCOUNTER — OFFICE VISIT (OUTPATIENT)
Dept: MEDICAL GROUP | Facility: MEDICAL CENTER | Age: 31
End: 2021-09-14
Payer: COMMERCIAL

## 2021-09-14 VITALS
RESPIRATION RATE: 16 BRPM | HEIGHT: 64 IN | TEMPERATURE: 97.2 F | BODY MASS INDEX: 38.93 KG/M2 | OXYGEN SATURATION: 97 % | SYSTOLIC BLOOD PRESSURE: 126 MMHG | WEIGHT: 228 LBS | HEART RATE: 72 BPM | DIASTOLIC BLOOD PRESSURE: 72 MMHG

## 2021-09-14 DIAGNOSIS — M25.541 ARTHRALGIA OF BOTH HANDS: ICD-10-CM

## 2021-09-14 DIAGNOSIS — G89.29 CHRONIC PAIN OF LEFT KNEE: ICD-10-CM

## 2021-09-14 DIAGNOSIS — M25.562 CHRONIC PAIN OF LEFT KNEE: ICD-10-CM

## 2021-09-14 DIAGNOSIS — M25.542 ARTHRALGIA OF BOTH HANDS: ICD-10-CM

## 2021-09-14 LAB
SARS-COV-2 RNA RESP QL NAA+PROBE: NOTDETECTED
SPECIMEN SOURCE: NORMAL

## 2021-09-14 PROCEDURE — 99214 OFFICE O/P EST MOD 30 MIN: CPT | Performed by: STUDENT IN AN ORGANIZED HEALTH CARE EDUCATION/TRAINING PROGRAM

## 2021-09-14 ASSESSMENT — FIBROSIS 4 INDEX: FIB4 SCORE: 0.31

## 2021-09-14 ASSESSMENT — PATIENT HEALTH QUESTIONNAIRE - PHQ9
SUM OF ALL RESPONSES TO PHQ QUESTIONS 1-9: 5
CLINICAL INTERPRETATION OF PHQ2 SCORE: 2
5. POOR APPETITE OR OVEREATING: 1 - SEVERAL DAYS

## 2021-09-14 NOTE — PROGRESS NOTES
Subjective:     CC:  Diagnoses of Arthralgia of both hands and Chronic pain of left knee were pertinent to this visit.    HISTORY OF THE PRESENT ILLNESS: Patient is a 31 y.o. female. This pleasant patient is here today to establish care and discuss arthritis, joint pains.  No prior PCP for several years.    Joint pain  Patient states severe joint pain for several years.  Patient states that she previously saw a rheumatologist Dr. Rollins but was unable to follow-up.  Patient had labs in 2013 that showed elevated rheumatoid factor but negative BUSHRA.   Patient states that she previously was exercising and dropped her weight down to 195 but due to his extreme knee pain had to stop.  Patient states she has always had problems with her left knee and has previously had an MRI.  MRI has showed joint effusion and cyst.  Patient has never followed up on this and has never been seen by orthopedics.    Patient states that she frequently works with her hands and they often have numbness, tingling and cramp up.  Patient states that she feels this throughout her entire hand.  On physical exam patient does have positive Tinel's test and Phalen's test.    GYN  Patient does continue to follow with Dr. Gardner for GYN.  Patient up-to-date on Paps.    PCOS  Patient previously seen by endocrinology for PCOS.  On review of treatment from 2020, patient was placed on spironolactone and Metformin.  Patient is no longer on these medications.  Patient previously placed on phentermine for weight loss.    Health Maintenance: Completed    ROS:   Gen: no fevers/chills, no changes in weight  Eyes: no changes in vision  ENT: no sore throat, no hearing loss, no bloody nose  Pulm: no sob, no cough  CV: no chest pain, no palpitations  GI: no nausea/vomiting, no diarrhea  : no dysuria  MSk: no myalgias  Skin: no rash  Neuro: no headaches, no numbness/tingling  Heme/Lymph: no easy bruising      Objective:     Exam: /72 (BP Location: Right arm,  "Patient Position: Sitting, BP Cuff Size: Adult)   Pulse 72   Temp 36.2 °C (97.2 °F) (Temporal)   Resp 16   Ht 1.626 m (5' 4\")   Wt 103 kg (228 lb)   SpO2 97%  Body mass index is 39.14 kg/m².    General: Normal appearing. No distress.  HEENT: Normocephalic. Eyes conjunctiva clear lids without ptosis, pupils equal and reactive to light accommodation, ears normal shape and contour, canals are clear bilaterally, tympanic membranes are benign  Pulmonary: Clear to ausculation.  Normal effort. No rales, ronchi, or wheezing.  Cardiovascular: Regular rate and rhythm without murmur.   Abdomen: Soft, nontender, nondistended.   Neurologic: Grossly nonfocal  Lymph: No cervical or supraclavicular lymph nodes are palpable  Skin: Warm and dry.  No obvious lesions.  Musculoskeletal: Normal gait. No extremity cyanosis, clubbing, or edema.  Psych: Normal mood and affect. Alert and oriented x3. Judgment and insight is normal.      Assessment & Plan:   31 y.o. female with the following -    1. Arthralgia of both hands  Patient states mother with history of rheumatoid arthritis.  Patient continues to have arthralgias of both hands.  Patient labs from 2013 showed elevated rheumatoid factor, negative BUSHRA, elevated CRP.  Will complete joint survey to further evaluate for RA.  Patient previously seeing Dr. Rollins.  Patient requesting referral back to rheumatology due to chronic pains and limitations at her job.  Patient on physical exam does not have any significant swelling or irritation of the joints.  Patient does have a positive Tinel's test and Phalen's test.  Discussed with patient that this pain may be secondary to carpal tunnel and not arthritis.  Patient encouraged to continue monitoring symptoms.  - REFERRAL TO RHEUMATOLOGY  - DX-JOINT SURVEY-HANDS SINGLE VIEW; Future    2. Chronic pain of left knee  Patient continues to express concern over left knee pain.  Patient previously had MRI of left knee in 2016.  MRI showed intact " ligaments and menisci with minimal joint effusion and popliteal cyst.  Patient continues to have difficulty with his knee.  Patient has never been further evaluated by orthopedics.  Referral placed to orthopedics for further evaluation.  Patient encouraged to wrap knee when exercising for added support.   - REFERRAL TO ORTHOPEDICS        Return in about 1 year (around 9/14/2022), or if symptoms worsen or fail to improve.    Please note that this dictation was created using voice recognition software. I have made every reasonable attempt to correct obvious errors, but I expect that there are errors of grammar and possibly content that I did not discover before finalizing the note.

## 2021-09-14 NOTE — LETTER
[facility]  Candice Segura P.A.-C.  [address]  [place] [address]  Fax: [phone]   Authorization for Release/Disclosure of   Protected Health Information   Name: EMILE PALMER : 1990 SSN: [id number]   Address: [address] [place] [address] [facility] [id number]  [place] [address] Phone:    [phone] (home) [phone] (work)   I authorize the entity listed below to release/disclose the PHI below to:   [facility]/Candice Segura P.A.-C. and Candice Segura P.A.-C.   Provider or Entity Name:     Address   City, State, Zip   Phone:      Fax:     Reason for request: continuity of care   Information to be released:    [  ] LAST COLONOSCOPY,  including any PATH REPORT and follow-up  [  ] LAST FIT/COLOGUARD RESULT [  ] LAST DEXA  [  ] LAST MAMMOGRAM  [  ] LAST PAP  [  ] LAST LABS [  ] RETINA EXAM REPORT  [  ] IMMUNIZATION RECORDS  [  ] Release all info      [  ] Check here and initial the line next to each item to release ALL health information INCLUDING  _____ Care and treatment for drug and / or alcohol abuse  _____ HIV testing, infection status, or AIDS  _____ Genetic Testing    DATES OF SERVICE OR TIME PERIOD TO BE DISCLOSED: _____________  I understand and acknowledge that:  * This Authorization may be revoked at any time by you in writing, except if your health information has already been used or disclosed.  * Your health information that will be used or disclosed as a result of you signing this authorization could be re-disclosed by the recipient. If this occurs, your re-disclosed health information may no longer be protected by State or Federal laws.  * You may refuse to sign this Authorization. Your refusal will not affect your ability to obtain treatment.  * This Authorization becomes effective upon signing and will  on (date) __________.      If no date is indicated, this Authorization will  one (1) year from the signature date.    Name: Emile Ribera  Abdoul    Signature:   Date:     9/14/2021       PLEASE FAX REQUESTED RECORDS BACK TO: [phone]

## 2021-09-23 DIAGNOSIS — M25.542 ARTHRALGIA OF BOTH HANDS: ICD-10-CM

## 2021-09-23 DIAGNOSIS — M25.541 ARTHRALGIA OF BOTH HANDS: ICD-10-CM

## 2021-09-30 ENCOUNTER — HOSPITAL ENCOUNTER (OUTPATIENT)
Dept: LAB | Facility: MEDICAL CENTER | Age: 31
End: 2021-09-30
Attending: STUDENT IN AN ORGANIZED HEALTH CARE EDUCATION/TRAINING PROGRAM
Payer: COMMERCIAL

## 2021-09-30 ENCOUNTER — HOSPITAL ENCOUNTER (OUTPATIENT)
Dept: RADIOLOGY | Facility: MEDICAL CENTER | Age: 31
End: 2021-09-30
Attending: STUDENT IN AN ORGANIZED HEALTH CARE EDUCATION/TRAINING PROGRAM
Payer: COMMERCIAL

## 2021-09-30 DIAGNOSIS — M25.541 ARTHRALGIA OF BOTH HANDS: ICD-10-CM

## 2021-09-30 DIAGNOSIS — M25.542 ARTHRALGIA OF BOTH HANDS: ICD-10-CM

## 2021-09-30 LAB — RHEUMATOID FACT SER IA-ACNC: 14 IU/ML (ref 0–14)

## 2021-09-30 PROCEDURE — 86431 RHEUMATOID FACTOR QUANT: CPT

## 2021-09-30 PROCEDURE — 86200 CCP ANTIBODY: CPT

## 2021-09-30 PROCEDURE — 77077 JOINT SURVEY SINGLE VIEW: CPT

## 2021-09-30 PROCEDURE — 36415 COLL VENOUS BLD VENIPUNCTURE: CPT

## 2021-10-03 LAB — CCP IGG SERPL-ACNC: 3 UNITS (ref 0–19)

## 2021-10-19 ENCOUNTER — HOSPITAL ENCOUNTER (OUTPATIENT)
Facility: MEDICAL CENTER | Age: 31
End: 2021-10-19
Attending: SURGERY
Payer: COMMERCIAL

## 2021-10-19 LAB
COVID ORDER STATUS COVID19: NORMAL
SARS-COV-2 RNA RESP QL NAA+PROBE: DETECTED
SPECIMEN SOURCE: ABNORMAL

## 2021-10-19 PROCEDURE — U0005 INFEC AGEN DETEC AMPLI PROBE: HCPCS

## 2021-10-19 PROCEDURE — U0003 INFECTIOUS AGENT DETECTION BY NUCLEIC ACID (DNA OR RNA); SEVERE ACUTE RESPIRATORY SYNDROME CORONAVIRUS 2 (SARS-COV-2) (CORONAVIRUS DISEASE [COVID-19]), AMPLIFIED PROBE TECHNIQUE, MAKING USE OF HIGH THROUGHPUT TECHNOLOGIES AS DESCRIBED BY CMS-2020-01-R: HCPCS

## 2021-10-20 LAB
AMBIGUOUS DTTM AMBI4: NORMAL
SIGNIFICANT IND 70042: NORMAL
SITE SITE: NORMAL
SOURCE SOURCE: NORMAL

## 2021-10-23 ENCOUNTER — APPOINTMENT (OUTPATIENT)
Dept: TELEHEALTH | Facility: TELEMEDICINE | Age: 31
End: 2021-10-23
Payer: COMMERCIAL

## 2022-01-02 ENCOUNTER — HOSPITAL ENCOUNTER (EMERGENCY)
Facility: MEDICAL CENTER | Age: 32
End: 2022-01-02
Attending: EMERGENCY MEDICINE
Payer: COMMERCIAL

## 2022-01-02 VITALS
BODY MASS INDEX: 37.49 KG/M2 | TEMPERATURE: 97.9 F | OXYGEN SATURATION: 94 % | RESPIRATION RATE: 20 BRPM | SYSTOLIC BLOOD PRESSURE: 126 MMHG | WEIGHT: 225 LBS | DIASTOLIC BLOOD PRESSURE: 77 MMHG | HEIGHT: 65 IN | HEART RATE: 92 BPM

## 2022-01-02 DIAGNOSIS — J06.9 UPPER RESPIRATORY TRACT INFECTION, UNSPECIFIED TYPE: ICD-10-CM

## 2022-01-02 DIAGNOSIS — E66.09 CLASS 1 OBESITY DUE TO EXCESS CALORIES WITHOUT SERIOUS COMORBIDITY IN ADULT, UNSPECIFIED BMI: ICD-10-CM

## 2022-01-02 DIAGNOSIS — Z20.822 SUSPECTED COVID-19 VIRUS INFECTION: ICD-10-CM

## 2022-01-02 LAB
SARS-COV-2 RNA RESP QL NAA+PROBE: DETECTED
SPECIMEN SOURCE: ABNORMAL

## 2022-01-02 PROCEDURE — U0003 INFECTIOUS AGENT DETECTION BY NUCLEIC ACID (DNA OR RNA); SEVERE ACUTE RESPIRATORY SYNDROME CORONAVIRUS 2 (SARS-COV-2) (CORONAVIRUS DISEASE [COVID-19]), AMPLIFIED PROBE TECHNIQUE, MAKING USE OF HIGH THROUGHPUT TECHNOLOGIES AS DESCRIBED BY CMS-2020-01-R: HCPCS

## 2022-01-02 PROCEDURE — 99283 EMERGENCY DEPT VISIT LOW MDM: CPT

## 2022-01-02 PROCEDURE — 700102 HCHG RX REV CODE 250 W/ 637 OVERRIDE(OP): Performed by: EMERGENCY MEDICINE

## 2022-01-02 PROCEDURE — A9270 NON-COVERED ITEM OR SERVICE: HCPCS | Performed by: EMERGENCY MEDICINE

## 2022-01-02 PROCEDURE — U0005 INFEC AGEN DETEC AMPLI PROBE: HCPCS

## 2022-01-02 RX ORDER — ALBUTEROL SULFATE 90 UG/1
2 AEROSOL, METERED RESPIRATORY (INHALATION) EVERY 6 HOURS PRN
Qty: 8.5 G | Refills: 0 | Status: SHIPPED | OUTPATIENT
Start: 2022-01-02 | End: 2022-01-11

## 2022-01-02 RX ORDER — BENZONATATE 100 MG/1
100 CAPSULE ORAL 3 TIMES DAILY PRN
Qty: 30 CAPSULE | Refills: 0 | Status: SHIPPED | OUTPATIENT
Start: 2022-01-02 | End: 2022-07-05

## 2022-01-02 RX ORDER — ACETAMINOPHEN 325 MG/1
650 TABLET ORAL ONCE
Status: COMPLETED | OUTPATIENT
Start: 2022-01-02 | End: 2022-01-02

## 2022-01-02 RX ORDER — DEXAMETHASONE 2 MG/1
6 TABLET ORAL DAILY
Qty: 30 TABLET | Refills: 0 | Status: SHIPPED | OUTPATIENT
Start: 2022-01-02 | End: 2022-01-12

## 2022-01-02 RX ADMIN — ACETAMINOPHEN 650 MG: 325 TABLET ORAL at 12:14

## 2022-01-02 ASSESSMENT — FIBROSIS 4 INDEX: FIB4 SCORE: 0.31

## 2022-01-02 NOTE — DISCHARGE INSTRUCTIONS
Please take Decadron daily for 10 days if Covid positive.  If your test result comes back negative tomorrow please stop Decadron and continue albuterol and Tessalon Perles as needed    Return if in functional decline and feeling quite weak

## 2022-01-02 NOTE — ED TRIAGE NOTES
Chief Complaint   Patient presents with   • Sore Throat   • Fever   • Body Aches     Pt ambulated to triage with 3days of bodyaches,fever ,sorethroat and starting of cough.   Pt was vaccinated covid=19 in October.

## 2022-01-02 NOTE — Clinical Note
Dee Cornejo was seen and treated in our emergency department on 1/2/2022.  She may return to work on 01/04/2022.  May return on January 4 if Covid test is negative and upper respiratory symptoms have improved.  If Covid positive please let your employer now and quarantine per CDC guidelines     If you have any questions or concerns, please don't hesitate to call.      Lm Parker M.D.

## 2022-01-02 NOTE — ED NOTES
Patient discharged in stable condition per orders. Patient verbalized understanding of all discharge instructions. All belongings accounted for. Ambulatory for discharge with steady gait.

## 2022-01-02 NOTE — ED PROVIDER NOTES
ED Provider Note    Scribed for Lm Parker M.D. by Ramakrishna Pérez. 1/2/2022  11:54 AM    Primary care provider: Candice Segura P.A.-C.  Means of arrival: Walk-in  History obtained from: Patient  History limited by: None    CHIEF COMPLAINT  Chief Complaint   Patient presents with   • Sore Throat   • Fever   • Body Aches       HPI  Dee Cornejo is a 31 y.o. female who presents to the Emergency Department for a sore throat onset 3 days ago. She has associated headaches, body aches, fevers, and coughs. She says her coughs started today. Denies vomiting. 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 She notes her son has been sick for 4-5 days. Her  was sick with COVID about a month ago but she felt fine. She received her 2nd dose of COVID vaccine in October.    REVIEW OF SYSTEMS  Pertinent negatives include no vomiting.    See HPI for further details.     PAST MEDICAL HISTORY   has a past medical history of PCOS (polycystic ovarian syndrome) and UTI (lower urinary tract infection).    SURGICAL HISTORY   has a past surgical history that includes lap,cholecystectomy (2011); gyn surgery; and cholecystectomy (2011).    SOCIAL HISTORY  Social History     Tobacco Use   • Smoking status: Former Smoker     Packs/day: 0.00   • Smokeless tobacco: Never Used   • Tobacco comment: 1 yr   Vaping Use   • Vaping Use: Never used   Substance Use Topics   • Alcohol use: Yes     Comment: occasionally   • Drug use: Yes     Types: Marijuana      Social History     Substance and Sexual Activity   Drug Use Yes   • Types: Marijuana       FAMILY HISTORY  Family History   Problem Relation Age of Onset   • Cancer Maternal Aunt         stomach   • Arthritis Mother    • Heart Disease Maternal Grandfather    • Stroke Maternal Grandfather        CURRENT MEDICATIONS  Home Medications     Reviewed by Briana Ceballos R.N. (Registered Nurse) on 01/02/22 at 1117  Med List Status:  "Not Addressed   Medication Last Dose Status   albuterol 108 (90 Base) MCG/ACT Aero Soln inhalation aerosol  Active   Norethin-Eth Estrad-Fe Biphas (LO LOESTRIN FE) 1 MG-10 MCG / 10 MCG Tab  Active                ALLERGIES  Allergies   Allergen Reactions   • Nkda [No Known Drug Allergy]        PHYSICAL EXAM  VITAL SIGNS: /77   Pulse 92   Temp 36.6 °C (97.9 °F) (Temporal)   Resp 20   Ht 1.651 m (5' 5\")   Wt 102 kg (225 lb)   LMP  (LMP Unknown)   SpO2 94%   BMI 37.44 kg/m²   Constitutional: Well developed, Well nourished, No acute distress, Non-toxic appearance. Morbidly obese.  HENT: Normocephalic, Atraumatic, Bilateral external ears normal, Oropharynx is clear mucous membranes are moist. Mild erythema of throat, No oral exudates or nasal discharge.   Eyes: Pupils are equal round and reactive, EOMI, Conjunctiva normal, No discharge.   Neck: Normal range of motion, No tenderness, Supple, No stridor. No meningismus.  Lymphatic: No lymphadenopathy noted.   Cardiovascular: Regular rate and rhythm without murmur rub or gallop.  Thorax & Lungs: Clear breath sounds bilaterally without rhonchi or rales. Mild wheezing. There is no chest wall tenderness.   Abdomen: Soft non-tender non-distended. There is no rebound or guarding. No organomegaly is appreciated. Bowel sounds are normal.  Skin: Normal without rash.   Back: No CVA or spinal tenderness.   Extremities: Intact distal pulses, No edema, No tenderness, No cyanosis, No clubbing. Capillary refill is less than 2 seconds.  Musculoskeletal: Good range of motion in all major joints. No tenderness to palpation or major deformities noted.   Neurologic: Alert & oriented x 3, Normal motor function, Normal sensory function, No focal deficits noted. Reflexes are normal.  Psychiatric: Affect normal, Judgment normal, Mood normal. There is no suicidal ideation or patient reported hallucinations.       DIAGNOSTIC STUDIES / PROCEDURES    LABS  Labs Reviewed   SARS-COV-2, PCR " (IN-HOUSE)      All labs reviewed by me.      COURSE & MEDICAL DECISION MAKING  Nursing notes, GINNY PMSFHx reviewed in chart.    11:54 AM Patient seen and examined at bedside. Ordered for labs to evaluate. Given the patient's symptomatology, the likelihood of a viral illness is high. She understands that the immune system is built to clear this type of infection. She understands that antibiotics will not change the course of this type of infection and that the patient's immune system is well suited to find this type of infection. The mainstay of therapy for viral infections is copious fluids, rest, fever control and frequent hand washing to avoid spread of the illness. Cool mist humidifier in the patient's bedroom will keep his mucous membranes healthy. Return precautions and plan of care were discussed to which she understands and verbalizes agreement. The patient was given the opportunity to ask questions. She is ready for discharge at this time.    Patient understands that she needs to quarantine until the results of her coronavirus test are known.  If she is positive she will continue Decadron therapy as well as other medications given for as needed relief.  If she is negative she should not return to work until her symptoms have improved and at the earliest on January 4    The patient will return for new or worsening symptoms and is stable at the time of discharge.    DISPOSITION:  Patient will be discharged home in stable condition.  She will return if she is in functional decline and feeling much weaker    FINAL IMPRESSION  1. Suspected COVID-19 virus infection    2. Upper respiratory tract infection, unspecified type    3. Class 1 obesity due to excess calories without serious comorbidity in adult, unspecified BMI          Ramakrishna HEDRICK (Scribshaina), am scribing for, and in the presence of, Lm Parker M.D..    Electronically signed by: Ramakrishna Pérez (Princess), 1/2/2022    Lm HEDRICK M.D. personally  performed the services described in this documentation, as scribed by Ramakrishna Pérez in my presence, and it is both accurate and complete.    The note accurately reflects work and decisions made by me.  Lm Parker M.D.  1/2/2022  12:09 PM    E

## 2022-01-10 ENCOUNTER — HOSPITAL ENCOUNTER (OUTPATIENT)
Facility: MEDICAL CENTER | Age: 32
End: 2022-01-10
Attending: SURGERY
Payer: COMMERCIAL

## 2022-01-10 PROCEDURE — U0005 INFEC AGEN DETEC AMPLI PROBE: HCPCS

## 2022-01-10 PROCEDURE — U0003 INFECTIOUS AGENT DETECTION BY NUCLEIC ACID (DNA OR RNA); SEVERE ACUTE RESPIRATORY SYNDROME CORONAVIRUS 2 (SARS-COV-2) (CORONAVIRUS DISEASE [COVID-19]), AMPLIFIED PROBE TECHNIQUE, MAKING USE OF HIGH THROUGHPUT TECHNOLOGIES AS DESCRIBED BY CMS-2020-01-R: HCPCS

## 2022-01-11 ENCOUNTER — TELEMEDICINE (OUTPATIENT)
Dept: MEDICAL GROUP | Facility: MEDICAL CENTER | Age: 32
End: 2022-01-11
Payer: COMMERCIAL

## 2022-01-11 VITALS — OXYGEN SATURATION: 97 % | BODY MASS INDEX: 38.76 KG/M2 | WEIGHT: 227 LBS | HEART RATE: 88 BPM | HEIGHT: 64 IN

## 2022-01-11 DIAGNOSIS — U07.1 COVID-19: ICD-10-CM

## 2022-01-11 DIAGNOSIS — G47.01 INSOMNIA DUE TO MEDICAL CONDITION: ICD-10-CM

## 2022-01-11 PROCEDURE — 99213 OFFICE O/P EST LOW 20 MIN: CPT | Mod: CS,95 | Performed by: FAMILY MEDICINE

## 2022-01-11 RX ORDER — ZOLPIDEM TARTRATE 5 MG/1
5 TABLET ORAL NIGHTLY PRN
Qty: 14 TABLET | Refills: 0 | Status: SHIPPED | OUTPATIENT
Start: 2022-01-11 | End: 2022-01-25

## 2022-01-11 RX ORDER — SUMATRIPTAN 25 MG/1
25 TABLET, FILM COATED ORAL
Qty: 10 TABLET | Refills: 0 | Status: SHIPPED | OUTPATIENT
Start: 2022-01-11 | End: 2022-07-05

## 2022-01-11 RX ORDER — ASCORBIC ACID 100 MG
TABLET,CHEWABLE ORAL
COMMUNITY
Start: 2021-10-19 | End: 2022-05-02

## 2022-01-11 RX ORDER — ACETAMINOPHEN 500 MG
TABLET ORAL
Status: ON HOLD | COMMUNITY
Start: 2021-10-19 | End: 2022-05-24

## 2022-01-11 ASSESSMENT — PATIENT HEALTH QUESTIONNAIRE - PHQ9
CLINICAL INTERPRETATION OF PHQ2 SCORE: 1
SUM OF ALL RESPONSES TO PHQ QUESTIONS 1-9: 5
5. POOR APPETITE OR OVEREATING: 0 - NOT AT ALL

## 2022-01-11 ASSESSMENT — FIBROSIS 4 INDEX: FIB4 SCORE: 0.31

## 2022-01-11 NOTE — ASSESSMENT & PLAN NOTE
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  We will do a trial of Ambien.  Discussed potential side effects patient to use as needed for nighttime sleep and not to mix with other sedating medications.  PDMP reviewed no recent fills.

## 2022-01-11 NOTE — PATIENT INSTRUCTIONS
SLEEP HYGIENE CHECKLIST:    -Avoid naps.  Napping during the day can disturb the normal pattern of sleep and wakefulness.  -Avoid stimulants, such as caffeine and nicotine, and alcohol as bedtime approaches.  While alcohol is well known to speed the onset of sleep, the process of the body metabolizing the alcohol can cause arousal, thus disrupting sleep.  -Exercise.  All forms of exercise help to ensure sound sleep.  Vigorous activity should be conducted in the morning or late afternoon, while a relaxing exercise, like yoga, can be done before bed to help initiate a restful night sleep.  -Avoid foods too close to bedtime-particularly large meals and chocolate (which contains caffeine).  And try not to make any significant change to your diet.  For example, if you are struggling with sleep problem, is not a good time to start experimenting with spicy dishes.  -Soak up some natural light.  This is particularly important for older people who may not venture outside as frequently as children in younger adults.  Light exposure helps maintain a healthy sleep-wake cycle.  -Establish a regular bedtime routine.  Try to avoid emotionally upsetting conversations and activities before going to sleep.  -Associated bed with sleep.  Is not a good idea to watch television, use her computer or phone, listen to the radio, or read while in bed.  -Ensure a pleasant, relaxing sleep environment.  The bed should be comfortable, in the room should not be too hot, cold, or bright.    Check out www.sleepfoundation.org        General Headache Without Cause  A headache is pain or discomfort that is felt around the head or neck area. There are many causes and types of headaches. In some cases, the cause may not be found.  Follow these instructions at home:  Watch your condition for any changes. Let your doctor know about them. Take these steps to help with your condition:  Managing pain         · Take over-the-counter and prescription medicines  only as told by your doctor.  · Lie down in a dark, quiet room when you have a headache.  · If told, put ice on your head and neck area:  ? Put ice in a plastic bag.  ? Place a towel between your skin and the bag.  ? Leave the ice on for 20 minutes, 2-3 times per day.  · If told, put heat on the affected area. Use the heat source that your doctor recommends, such as a moist heat pack or a heating pad.  ? Place a towel between your skin and the heat source.  ? Leave the heat on for 20-30 minutes.  ? Remove the heat if your skin turns bright red. This is very important if you are unable to feel pain, heat, or cold. You may have a greater risk of getting burned.  · Keep lights dim if bright lights bother you or make your headaches worse.  Eating and drinking  · Eat meals on a regular schedule.  · If you drink alcohol:  ? Limit how much you use to:  § 0-1 drink a day for women.  § 0-2 drinks a day for men.  ? Be aware of how much alcohol is in your drink. In the [place] one drink equals one 12 oz bottle of beer (355 mL), one 5 oz glass of wine (148 mL), or one 1½ oz glass of hard liquor (44 mL).  · Stop drinking caffeine, or reduce how much caffeine you drink.  General instructions    · Keep a journal to find out if certain things bring on headaches. For example, write down:  ? What you eat and drink.  ? How much sleep you get.  ? Any change to your diet or medicines.  · Get a massage or try other ways to relax.  · Limit stress.  · Sit up straight. Do not tighten (tense) your muscles.  · Do not use any products that contain nicotine or tobacco. This includes cigarettes, e-cigarettes, and chewing tobacco. If you need help quitting, ask your doctor.  · Exercise regularly as told by your doctor.  · Get enough sleep. This often means 7-9 hours of sleep each night.  · Keep all follow-up visits as told by your doctor. This is important.  Contact a doctor if:  · Your symptoms are not helped by medicine.  · You have a headache  that feels different than the other headaches.  · You feel sick to your stomach (nauseous) or you throw up (vomit).  · You have a fever.  Get help right away if:  · Your headache gets very bad quickly.  · Your headache gets worse after a lot of physical activity.  · You keep throwing up.  · You have a stiff neck.  · You have trouble seeing.  · You have trouble speaking.  · You have pain in the eye or ear.  · Your muscles are weak or you lose muscle control.  · You lose your balance or have trouble walking.  · You feel like you will pass out (faint) or you pass out.  · You are mixed up (confused).  · You have a seizure.  Summary  · A headache is pain or discomfort that is felt around the head or neck area.  · There are many causes and types of headaches. In some cases, the cause may not be found.  · Keep a journal to help find out what causes your headaches. Watch your condition for any changes. Let your doctor know about them.  · Contact a doctor if you have a headache that is different from usual, or if your headache is not helped by medicine.  · Get help right away if your headache gets very bad, you throw up, you have trouble seeing, you lose your balance, or you have a seizure.  This information is not intended to replace advice given to you by your health care provider. Make sure you discuss any questions you have with your health care provider.  Document Released: 09/26/2009 Document Revised: 07/08/2019 Document Reviewed: 07/08/2019  Elsevier Patient Education © 2020 [employer] Inc[employer]

## 2022-01-11 NOTE — PROGRESS NOTES
Virtual Visit: Established Patient   This visit was conducted via Zoom using secure and encrypted videoconferencing technology.   The patient was in a private location in the state of [place].    The patient's identity was confirmed and verbal consent was obtained for this virtual visit.    Subjective:   CC:   Chief Complaint   Patient presents with   • Body Aches     symptoms causing pt to not get sleep. Tested positive for covid on 1/2   • Headache   • Sleep Problem     Dee Cornejo is a 31 y.o. female presenting for evaluation and management of:    Covid-19 with Headache and Insomnia:  On 1/2 tested positive for Covid in ER, she has had two shot mRNA series but was not due for booster yet. This is her second time getting Covid.  Tested positive again yesterday, was hoping to go back to work  Still having body aches, sore throat and insomnia  Has one more day of decadron and is using inhaler nightly  Has bad headache as well. Pounding headache behind left eye, denies photophobia and phonophobia. Taking APAP and IBP every 4-6 hours and not helping.    Waking up every couple of hours, hard time falling asleep.  She has not had issues with sleep in the past.  It started with the COVID infection.  She is having a hard time functioning during the day and she has a 4-year-old child that she is taking care of at home plus 11-year-old daughter.    ROS   See HPI    Current medicines (including changes today)  Current Outpatient Medications   Medication Sig Dispense Refill   • Ascorbic Acid (VITAMIN C) 100 MG Chew Tab      • acetaminophen (TYLENOL) 500 MG Tab      • ELDERBERRY PO      • zolpidem (AMBIEN) 5 MG Tab Take 1 Tablet by mouth at bedtime as needed for Sleep for up to 14 days. 14 Tablet 0   • SUMAtriptan (IMITREX) 25 MG Tab tablet Take 1 Tablet by mouth one time as needed for Migraine for up to 1 dose. 10 Tablet 0   • dexamethasone (DECADRON) 2 MG tablet Take 3 Tablets by mouth every day for 10 days. 30  "Tablet 0   • benzonatate (TESSALON) 100 MG Cap Take 1 Capsule by mouth 3 times a day as needed. 30 Capsule 0   • albuterol 108 (90 Base) MCG/ACT Aero Soln inhalation aerosol Inhale 2 Puffs every four hours as needed for Shortness of Breath. 8.5 g 0   • Norethin-Eth Estrad-Fe Biphas (LO LOESTRIN FE) 1 MG-10 MCG / 10 MCG Tab Take  by mouth. (Patient not taking: Reported on 1/11/2022)       No current facility-administered medications for this visit.       Patient Active Problem List    Diagnosis Date Noted   • COVID-19 01/11/2022   • Insomnia due to medical condition 01/11/2022        Objective:   Pulse 88   Ht 1.626 m (5' 4\")   Wt 103 kg (227 lb)   LMP  (LMP Unknown)   SpO2 97%   BMI 38.96 kg/m²     Physical Exam:  Constitutional: Alert, no distress, well-groomed.  Skin: No rashes in visible areas.  Eye: Round. Conjunctiva clear, lids normal. No icterus.   ENMT: Lips pink without lesions, Phonation normal.  Neck: Moves freely without pain.  Respiratory: Unlabored respiratory effort, no cough or audible wheeze  Psych: Alert and oriented x3, normal affect and mood.     Assessment and Plan:   The following treatment plan was discussed:     1. COVID-19  Patient to continue hydrating and symptomatic care.  Discussed that she probably will be ready to go back to work till she is symptom-free.  - For headache will trial sumatriptan as this has some migraine type features and she is already using over-the-counter medications appropriately    2. 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  We will do a trial of Ambien.  Discussed potential side effects patient to use as needed for nighttime sleep and not to mix with other sedating medications.  PDMP reviewed no recent fills.  - zolpidem (AMBIEN) 5 MG Tab; Take 1 Tablet by mouth at bedtime as needed for Sleep for up to 14 days.  Dispense: 14 Tablet; Refill: 0    Other orders  - Ascorbic Acid (VITAMIN C) 100 MG Chew Tab  - acetaminophen (TYLENOL) 500 MG " Tab  - ELDERBERRY PO  - SUMAtriptan (IMITREX) 25 MG Tab tablet; Take 1 Tablet by mouth one time as needed for Migraine for up to 1 dose.  Dispense: 10 Tablet; Refill: 0      Follow-up: Return if symptoms worsen or fail to improve.

## 2022-01-11 NOTE — ASSESSMENT & PLAN NOTE
Patient to continue hydrating and symptomatic care.  Discussed that she probably will be ready to go back to work till she is symptom-free.  - For headache will trial sumatriptan as this has some migraine type features and she is already using over-the-counter medications appropriately

## 2022-04-02 ENCOUNTER — HOSPITAL ENCOUNTER (OUTPATIENT)
Dept: LAB | Facility: MEDICAL CENTER | Age: 32
End: 2022-04-02
Attending: CLINICAL NURSE SPECIALIST
Payer: COMMERCIAL

## 2022-04-02 LAB
25(OH)D3 SERPL-MCNC: 13 NG/ML (ref 30–100)
ALBUMIN SERPL BCP-MCNC: 4.5 G/DL (ref 3.2–4.9)
ALBUMIN/GLOB SERPL: 1.7 G/DL
ALP SERPL-CCNC: 65 U/L (ref 30–99)
ALT SERPL-CCNC: 28 U/L (ref 2–50)
ANION GAP SERPL CALC-SCNC: 10 MMOL/L (ref 7–16)
AST SERPL-CCNC: 24 U/L (ref 12–45)
BASOPHILS # BLD AUTO: 0.6 % (ref 0–1.8)
BASOPHILS # BLD: 0.04 K/UL (ref 0–0.12)
BILIRUB SERPL-MCNC: 0.6 MG/DL (ref 0.1–1.5)
BUN SERPL-MCNC: 10 MG/DL (ref 8–22)
CALCIUM SERPL-MCNC: 9.5 MG/DL (ref 8.5–10.5)
CHLORIDE SERPL-SCNC: 103 MMOL/L (ref 96–112)
CHOLEST SERPL-MCNC: 234 MG/DL (ref 100–199)
CO2 SERPL-SCNC: 25 MMOL/L (ref 20–33)
CREAT SERPL-MCNC: 0.66 MG/DL (ref 0.5–1.4)
EOSINOPHIL # BLD AUTO: 0.09 K/UL (ref 0–0.51)
EOSINOPHIL NFR BLD: 1.2 % (ref 0–6.9)
ERYTHROCYTE [DISTWIDTH] IN BLOOD BY AUTOMATED COUNT: 41.5 FL (ref 35.9–50)
FERRITIN SERPL-MCNC: 46.1 NG/ML (ref 10–291)
FOLATE SERPL-MCNC: 15.1 NG/ML
GFR SERPLBLD CREATININE-BSD FMLA CKD-EPI: 119 ML/MIN/1.73 M 2
GLOBULIN SER CALC-MCNC: 2.7 G/DL (ref 1.9–3.5)
GLUCOSE SERPL-MCNC: 87 MG/DL (ref 65–99)
HCT VFR BLD AUTO: 43.2 % (ref 37–47)
HDLC SERPL-MCNC: 62 MG/DL
HGB BLD-MCNC: 14.9 G/DL (ref 12–16)
IMM GRANULOCYTES # BLD AUTO: 0.04 K/UL (ref 0–0.11)
IMM GRANULOCYTES NFR BLD AUTO: 0.6 % (ref 0–0.9)
IRON SATN MFR SERPL: 42 % (ref 15–55)
IRON SERPL-MCNC: 124 UG/DL (ref 40–170)
LDLC SERPL CALC-MCNC: 131 MG/DL
LYMPHOCYTES # BLD AUTO: 2.69 K/UL (ref 1–4.8)
LYMPHOCYTES NFR BLD: 37.2 % (ref 22–41)
MCH RBC QN AUTO: 32.4 PG (ref 27–33)
MCHC RBC AUTO-ENTMCNC: 34.5 G/DL (ref 33.6–35)
MCV RBC AUTO: 93.9 FL (ref 81.4–97.8)
MONOCYTES # BLD AUTO: 0.6 K/UL (ref 0–0.85)
MONOCYTES NFR BLD AUTO: 8.3 % (ref 0–13.4)
NEUTROPHILS # BLD AUTO: 3.78 K/UL (ref 2–7.15)
NEUTROPHILS NFR BLD: 52.1 % (ref 44–72)
NRBC # BLD AUTO: 0 K/UL
NRBC BLD-RTO: 0 /100 WBC
PLATELET # BLD AUTO: 294 K/UL (ref 164–446)
PMV BLD AUTO: 9.9 FL (ref 9–12.9)
POTASSIUM SERPL-SCNC: 3.8 MMOL/L (ref 3.6–5.5)
PROT SERPL-MCNC: 7.2 G/DL (ref 6–8.2)
RBC # BLD AUTO: 4.6 M/UL (ref 4.2–5.4)
SODIUM SERPL-SCNC: 138 MMOL/L (ref 135–145)
TIBC SERPL-MCNC: 292 UG/DL (ref 250–450)
TRIGL SERPL-MCNC: 203 MG/DL (ref 0–149)
UIBC SERPL-MCNC: 168 UG/DL (ref 110–370)
VIT B12 SERPL-MCNC: 636 PG/ML (ref 211–911)
WBC # BLD AUTO: 7.2 K/UL (ref 4.8–10.8)

## 2022-04-02 PROCEDURE — 82746 ASSAY OF FOLIC ACID SERUM: CPT

## 2022-04-02 PROCEDURE — 80053 COMPREHEN METABOLIC PANEL: CPT

## 2022-04-02 PROCEDURE — 82607 VITAMIN B-12: CPT

## 2022-04-02 PROCEDURE — 36415 COLL VENOUS BLD VENIPUNCTURE: CPT

## 2022-04-02 PROCEDURE — 82306 VITAMIN D 25 HYDROXY: CPT

## 2022-04-02 PROCEDURE — 83550 IRON BINDING TEST: CPT

## 2022-04-02 PROCEDURE — 84425 ASSAY OF VITAMIN B-1: CPT

## 2022-04-02 PROCEDURE — 82728 ASSAY OF FERRITIN: CPT

## 2022-04-02 PROCEDURE — 85025 COMPLETE CBC W/AUTO DIFF WBC: CPT

## 2022-04-02 PROCEDURE — 83540 ASSAY OF IRON: CPT

## 2022-04-02 PROCEDURE — 80061 LIPID PANEL: CPT

## 2022-04-04 ENCOUNTER — HOSPITAL ENCOUNTER (OUTPATIENT)
Dept: RADIOLOGY | Facility: MEDICAL CENTER | Age: 32
End: 2022-04-04
Attending: SURGERY
Payer: COMMERCIAL

## 2022-04-04 DIAGNOSIS — K21.9 GASTROESOPHAGEAL REFLUX DISEASE, UNSPECIFIED WHETHER ESOPHAGITIS PRESENT: ICD-10-CM

## 2022-04-04 PROCEDURE — 74240 X-RAY XM UPR GI TRC 1CNTRST: CPT

## 2022-04-09 LAB — VIT B1 BLD-MCNC: 112 NMOL/L (ref 70–180)

## 2022-04-19 ENCOUNTER — HOSPITAL ENCOUNTER (EMERGENCY)
Facility: MEDICAL CENTER | Age: 32
End: 2022-04-19
Attending: EMERGENCY MEDICINE
Payer: COMMERCIAL

## 2022-04-19 ENCOUNTER — APPOINTMENT (OUTPATIENT)
Dept: RADIOLOGY | Facility: MEDICAL CENTER | Age: 32
End: 2022-04-19
Attending: EMERGENCY MEDICINE
Payer: COMMERCIAL

## 2022-04-19 ENCOUNTER — APPOINTMENT (OUTPATIENT)
Dept: RADIOLOGY | Facility: MEDICAL CENTER | Age: 32
End: 2022-04-19
Payer: COMMERCIAL

## 2022-04-19 VITALS
OXYGEN SATURATION: 94 % | HEART RATE: 93 BPM | WEIGHT: 240 LBS | HEIGHT: 64 IN | BODY MASS INDEX: 40.97 KG/M2 | DIASTOLIC BLOOD PRESSURE: 55 MMHG | TEMPERATURE: 97.8 F | SYSTOLIC BLOOD PRESSURE: 102 MMHG | RESPIRATION RATE: 18 BRPM
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
## 2022-04-19 DIAGNOSIS — M25.512 ACUTE PAIN OF LEFT SHOULDER: ICD-10-CM

## 2022-04-19 DIAGNOSIS — S09.90XA CLOSED HEAD INJURY, INITIAL ENCOUNTER: ICD-10-CM

## 2022-04-19 DIAGNOSIS — M25.562 ACUTE PAIN OF LEFT KNEE: ICD-10-CM

## 2022-04-19 PROCEDURE — 72125 CT NECK SPINE W/O DYE: CPT

## 2022-04-19 PROCEDURE — 71045 X-RAY EXAM CHEST 1 VIEW: CPT

## 2022-04-19 PROCEDURE — 700101 HCHG RX REV CODE 250: Performed by: EMERGENCY MEDICINE

## 2022-04-19 PROCEDURE — 700111 HCHG RX REV CODE 636 W/ 250 OVERRIDE (IP): Performed by: EMERGENCY MEDICINE

## 2022-04-19 PROCEDURE — 700102 HCHG RX REV CODE 250 W/ 637 OVERRIDE(OP): Performed by: EMERGENCY MEDICINE

## 2022-04-19 PROCEDURE — 70450 CT HEAD/BRAIN W/O DYE: CPT

## 2022-04-19 PROCEDURE — 73060 X-RAY EXAM OF HUMERUS: CPT | Mod: LT

## 2022-04-19 PROCEDURE — 96372 THER/PROPH/DIAG INJ SC/IM: CPT

## 2022-04-19 PROCEDURE — A9270 NON-COVERED ITEM OR SERVICE: HCPCS | Performed by: EMERGENCY MEDICINE

## 2022-04-19 PROCEDURE — 99285 EMERGENCY DEPT VISIT HI MDM: CPT

## 2022-04-19 PROCEDURE — 73560 X-RAY EXAM OF KNEE 1 OR 2: CPT | Mod: LT

## 2022-04-19 PROCEDURE — 305948 HCHG GREEN TRAUMA ACT PRE-NOTIFY NO CC

## 2022-04-19 RX ORDER — OXYCODONE HYDROCHLORIDE 5 MG/1
5 TABLET ORAL ONCE
Status: COMPLETED | OUTPATIENT
Start: 2022-04-19 | End: 2022-04-19

## 2022-04-19 RX ORDER — OXYCODONE HYDROCHLORIDE 5 MG/1
5 TABLET ORAL EVERY 4 HOURS PRN
Qty: 10 TABLET | Refills: 0 | Status: SHIPPED | OUTPATIENT
Start: 2022-04-19 | End: 2022-04-22

## 2022-04-19 RX ORDER — CYCLOBENZAPRINE HCL 5 MG
5-10 TABLET ORAL 3 TIMES DAILY PRN
Qty: 30 TABLET | Refills: 0 | Status: SHIPPED | OUTPATIENT
Start: 2022-04-19 | End: 2022-07-05

## 2022-04-19 RX ORDER — KETOROLAC TROMETHAMINE 30 MG/ML
60 INJECTION, SOLUTION INTRAMUSCULAR; INTRAVENOUS ONCE
Status: COMPLETED | OUTPATIENT
Start: 2022-04-19 | End: 2022-04-19

## 2022-04-19 RX ORDER — PROPARACAINE HYDROCHLORIDE 5 MG/ML
1 SOLUTION/ DROPS OPHTHALMIC ONCE
Status: COMPLETED | OUTPATIENT
Start: 2022-04-19 | End: 2022-04-19

## 2022-04-19 RX ADMIN — FLUORESCEIN SODIUM 1 MG: 1 STRIP OPHTHALMIC at 21:00

## 2022-04-19 RX ADMIN — OXYCODONE 5 MG: 5 TABLET ORAL at 19:58

## 2022-04-19 RX ADMIN — KETOROLAC TROMETHAMINE 60 MG: 30 INJECTION, SOLUTION INTRAMUSCULAR at 19:58

## 2022-04-19 RX ADMIN — PROPARACAINE HYDROCHLORIDE 1 DROP: 5 SOLUTION/ DROPS OPHTHALMIC at 21:00

## 2022-04-19 ASSESSMENT — PAIN DESCRIPTION - PAIN TYPE: TYPE: ACUTE PAIN
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
## 2022-05-02 ENCOUNTER — PRE-ADMISSION TESTING (OUTPATIENT)
Dept: ADMISSIONS | Facility: MEDICAL CENTER | Age: 32
DRG: 621 | End: 2022-05-02
Attending: SURGERY
Payer: COMMERCIAL

## 2022-05-02 VITALS — BODY MASS INDEX: 39.78 KG/M2 | WEIGHT: 233 LBS | HEIGHT: 64 IN

## 2022-05-02 ASSESSMENT — FIBROSIS 4 INDEX: FIB4 SCORE: 0.49
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
## 2022-05-16 ENCOUNTER — PRE-ADMISSION TESTING (OUTPATIENT)
Dept: ADMISSIONS | Facility: MEDICAL CENTER | Age: 32
DRG: 621 | End: 2022-05-16
Attending: SURGERY
Payer: COMMERCIAL

## 2022-05-16 DIAGNOSIS — Z01.812 PRE-OPERATIVE LABORATORY EXAMINATION: ICD-10-CM

## 2022-05-16 LAB
ANION GAP SERPL CALC-SCNC: 9 MMOL/L (ref 7–16)
BUN SERPL-MCNC: 15 MG/DL (ref 8–22)
CALCIUM SERPL-MCNC: 9.5 MG/DL (ref 8.5–10.5)
CHLORIDE SERPL-SCNC: 103 MMOL/L (ref 96–112)
CO2 SERPL-SCNC: 26 MMOL/L (ref 20–33)
CREAT SERPL-MCNC: 0.61 MG/DL (ref 0.5–1.4)
ERYTHROCYTE [DISTWIDTH] IN BLOOD BY AUTOMATED COUNT: 40.1 FL (ref 35.9–50)
GFR SERPLBLD CREATININE-BSD FMLA CKD-EPI: 121 ML/MIN/1.73 M 2
GLUCOSE SERPL-MCNC: 95 MG/DL (ref 65–99)
HCT VFR BLD AUTO: 42.7 % (ref 37–47)
HGB BLD-MCNC: 15.1 G/DL (ref 12–16)
MCH RBC QN AUTO: 32.4 PG (ref 27–33)
MCHC RBC AUTO-ENTMCNC: 35.4 G/DL (ref 33.6–35)
MCV RBC AUTO: 91.6 FL (ref 81.4–97.8)
PLATELET # BLD AUTO: 328 K/UL (ref 164–446)
PMV BLD AUTO: 9.3 FL (ref 9–12.9)
POTASSIUM SERPL-SCNC: 4.1 MMOL/L (ref 3.6–5.5)
RBC # BLD AUTO: 4.66 M/UL (ref 4.2–5.4)
SODIUM SERPL-SCNC: 138 MMOL/L (ref 135–145)
WBC # BLD AUTO: 8.7 K/UL (ref 4.8–10.8)

## 2022-05-16 PROCEDURE — 85027 COMPLETE CBC AUTOMATED: CPT

## 2022-05-16 PROCEDURE — 36415 COLL VENOUS BLD VENIPUNCTURE: CPT

## 2022-05-16 PROCEDURE — 80048 BASIC METABOLIC PNL TOTAL CA: CPT

## 2022-05-24 ENCOUNTER — ANESTHESIA (OUTPATIENT)
Dept: SURGERY | Facility: MEDICAL CENTER | Age: 32
DRG: 621 | End: 2022-05-24
Payer: COMMERCIAL

## 2022-05-24 ENCOUNTER — HOSPITAL ENCOUNTER (INPATIENT)
Facility: MEDICAL CENTER | Age: 32
LOS: 1 days | DRG: 621 | End: 2022-05-25
Attending: SURGERY | Admitting: SURGERY
Payer: COMMERCIAL

## 2022-05-24 ENCOUNTER — ANESTHESIA EVENT (OUTPATIENT)
Dept: SURGERY | Facility: MEDICAL CENTER | Age: 32
DRG: 621 | End: 2022-05-24
Payer: COMMERCIAL

## 2022-05-24 DIAGNOSIS — G89.18 POSTOPERATIVE PAIN: ICD-10-CM

## 2022-05-24 PROBLEM — E66.01 MORBID OBESITY (HCC): Status: ACTIVE | Noted: 2022-05-24

## 2022-05-24 LAB
HCG UR QL: NEGATIVE
PATHOLOGY CONSULT NOTE: NORMAL

## 2022-05-24 PROCEDURE — 501570 HCHG TROCAR, SEPARATOR: Performed by: SURGERY

## 2022-05-24 PROCEDURE — 0DB64Z3 EXCISION OF STOMACH, PERCUTANEOUS ENDOSCOPIC APPROACH, VERTICAL: ICD-10-PCS | Performed by: SURGERY

## 2022-05-24 PROCEDURE — 700102 HCHG RX REV CODE 250 W/ 637 OVERRIDE(OP): Performed by: SURGERY

## 2022-05-24 PROCEDURE — 160035 HCHG PACU - 1ST 60 MINS PHASE I: Performed by: SURGERY

## 2022-05-24 PROCEDURE — 700105 HCHG RX REV CODE 258: Performed by: SURGERY

## 2022-05-24 PROCEDURE — 160009 HCHG ANES TIME/MIN: Performed by: SURGERY

## 2022-05-24 PROCEDURE — 160048 HCHG OR STATISTICAL LEVEL 1-5: Performed by: SURGERY

## 2022-05-24 PROCEDURE — 700105 HCHG RX REV CODE 258: Performed by: OBSTETRICS & GYNECOLOGY

## 2022-05-24 PROCEDURE — 00797 ANES IPER UPR ABD GSTR PX MO: CPT | Performed by: ANESTHESIOLOGY

## 2022-05-24 PROCEDURE — RXMED WILLOW AMBULATORY MEDICATION CHARGE: Performed by: SURGERY

## 2022-05-24 PROCEDURE — 501838 HCHG SUTURE GENERAL: Performed by: SURGERY

## 2022-05-24 PROCEDURE — 501571 HCHG TROCAR, SEPARATOR 12X100: Performed by: SURGERY

## 2022-05-24 PROCEDURE — 81025 URINE PREGNANCY TEST: CPT

## 2022-05-24 PROCEDURE — 700105 HCHG RX REV CODE 258: Performed by: ANESTHESIOLOGY

## 2022-05-24 PROCEDURE — 700111 HCHG RX REV CODE 636 W/ 250 OVERRIDE (IP): Performed by: ANESTHESIOLOGY

## 2022-05-24 PROCEDURE — 501664 HCHG TUBING, FILTER STRYKER: Performed by: SURGERY

## 2022-05-24 PROCEDURE — 88307 TISSUE EXAM BY PATHOLOGIST: CPT

## 2022-05-24 PROCEDURE — 700101 HCHG RX REV CODE 250: Performed by: ANESTHESIOLOGY

## 2022-05-24 PROCEDURE — A9270 NON-COVERED ITEM OR SERVICE: HCPCS | Performed by: SURGERY

## 2022-05-24 PROCEDURE — 700101 HCHG RX REV CODE 250: Performed by: SURGERY

## 2022-05-24 PROCEDURE — 160029 HCHG SURGERY MINUTES - 1ST 30 MINS LEVEL 4: Performed by: SURGERY

## 2022-05-24 PROCEDURE — 770001 HCHG ROOM/CARE - MED/SURG/GYN PRIV*

## 2022-05-24 PROCEDURE — 88312 SPECIAL STAINS GROUP 1: CPT

## 2022-05-24 PROCEDURE — 700111 HCHG RX REV CODE 636 W/ 250 OVERRIDE (IP): Performed by: SURGERY

## 2022-05-24 PROCEDURE — 700111 HCHG RX REV CODE 636 W/ 250 OVERRIDE (IP): Performed by: OBSTETRICS & GYNECOLOGY

## 2022-05-24 PROCEDURE — 160002 HCHG RECOVERY MINUTES (STAT): Performed by: SURGERY

## 2022-05-24 PROCEDURE — 502570 HCHG PACK, GASTRIC BANDING: Performed by: SURGERY

## 2022-05-24 PROCEDURE — 500002 HCHG ADHESIVE, DERMABOND: Performed by: SURGERY

## 2022-05-24 PROCEDURE — 160036 HCHG PACU - EA ADDL 30 MINS PHASE I: Performed by: SURGERY

## 2022-05-24 PROCEDURE — 160041 HCHG SURGERY MINUTES - EA ADDL 1 MIN LEVEL 4: Performed by: SURGERY

## 2022-05-24 RX ORDER — ACETAMINOPHEN 500 MG
1000 TABLET ORAL EVERY 6 HOURS
Status: DISCONTINUED | OUTPATIENT
Start: 2022-05-25 | End: 2022-05-25 | Stop reason: HOSPADM

## 2022-05-24 RX ORDER — ONDANSETRON 2 MG/ML
4 INJECTION INTRAMUSCULAR; INTRAVENOUS EVERY 4 HOURS PRN
Status: DISCONTINUED | OUTPATIENT
Start: 2022-05-24 | End: 2022-05-25 | Stop reason: HOSPADM

## 2022-05-24 RX ORDER — ALBUTEROL SULFATE 90 UG/1
2 AEROSOL, METERED RESPIRATORY (INHALATION) EVERY 4 HOURS PRN
Status: DISCONTINUED | OUTPATIENT
Start: 2022-05-24 | End: 2022-05-25 | Stop reason: HOSPADM

## 2022-05-24 RX ORDER — MIDAZOLAM HYDROCHLORIDE 1 MG/ML
INJECTION INTRAMUSCULAR; INTRAVENOUS PRN
Status: DISCONTINUED | OUTPATIENT
Start: 2022-05-24 | End: 2022-05-24 | Stop reason: SURG

## 2022-05-24 RX ORDER — HYDROMORPHONE HYDROCHLORIDE 1 MG/ML
0.1 INJECTION, SOLUTION INTRAMUSCULAR; INTRAVENOUS; SUBCUTANEOUS
Status: DISCONTINUED | OUTPATIENT
Start: 2022-05-24 | End: 2022-05-24 | Stop reason: HOSPADM

## 2022-05-24 RX ORDER — ROCURONIUM BROMIDE 10 MG/ML
INJECTION, SOLUTION INTRAVENOUS PRN
Status: DISCONTINUED | OUTPATIENT
Start: 2022-05-24 | End: 2022-05-24 | Stop reason: SURG

## 2022-05-24 RX ORDER — ACETAMINOPHEN 325 MG/1
650 TABLET ORAL EVERY 6 HOURS
Qty: 60 TABLET | Refills: 0 | Status: SHIPPED | OUTPATIENT
Start: 2022-05-24 | End: 2022-08-01

## 2022-05-24 RX ORDER — ACETAMINOPHEN 10 MG/ML
1 INJECTION, SOLUTION INTRAVENOUS ONCE
Status: COMPLETED | OUTPATIENT
Start: 2022-05-24 | End: 2022-05-24

## 2022-05-24 RX ORDER — SODIUM CHLORIDE, SODIUM LACTATE, POTASSIUM CHLORIDE, CALCIUM CHLORIDE 600; 310; 30; 20 MG/100ML; MG/100ML; MG/100ML; MG/100ML
INJECTION, SOLUTION INTRAVENOUS CONTINUOUS
Status: DISCONTINUED | OUTPATIENT
Start: 2022-05-24 | End: 2022-05-24 | Stop reason: HOSPADM

## 2022-05-24 RX ORDER — AMPICILLIN 1 G/1
2 INJECTION, POWDER, FOR SOLUTION INTRAMUSCULAR; INTRAVENOUS EVERY 6 HOURS
Status: DISCONTINUED | OUTPATIENT
Start: 2022-05-24 | End: 2022-05-24

## 2022-05-24 RX ORDER — HALOPERIDOL 5 MG/ML
1 INJECTION INTRAMUSCULAR EVERY 6 HOURS PRN
Status: DISCONTINUED | OUTPATIENT
Start: 2022-05-24 | End: 2022-05-25 | Stop reason: HOSPADM

## 2022-05-24 RX ORDER — PROMETHAZINE HYDROCHLORIDE 25 MG/1
25 SUPPOSITORY RECTAL EVERY 4 HOURS PRN
Status: DISCONTINUED | OUTPATIENT
Start: 2022-05-24 | End: 2022-05-25 | Stop reason: HOSPADM

## 2022-05-24 RX ORDER — HYDROMORPHONE HYDROCHLORIDE 1 MG/ML
0.4 INJECTION, SOLUTION INTRAMUSCULAR; INTRAVENOUS; SUBCUTANEOUS
Status: DISCONTINUED | OUTPATIENT
Start: 2022-05-24 | End: 2022-05-24 | Stop reason: HOSPADM

## 2022-05-24 RX ORDER — DEXTROSE MONOHYDRATE, SODIUM CHLORIDE, AND POTASSIUM CHLORIDE 50; 1.49; 4.5 G/1000ML; G/1000ML; G/1000ML
INJECTION, SOLUTION INTRAVENOUS CONTINUOUS
Status: DISCONTINUED | OUTPATIENT
Start: 2022-05-24 | End: 2022-05-25 | Stop reason: HOSPADM

## 2022-05-24 RX ORDER — SODIUM CHLORIDE, SODIUM LACTATE, POTASSIUM CHLORIDE, CALCIUM CHLORIDE 600; 310; 30; 20 MG/100ML; MG/100ML; MG/100ML; MG/100ML
INJECTION, SOLUTION INTRAVENOUS
Status: DISCONTINUED | OUTPATIENT
Start: 2022-05-24 | End: 2022-05-24 | Stop reason: SURG

## 2022-05-24 RX ORDER — ONDANSETRON 2 MG/ML
4 INJECTION INTRAMUSCULAR; INTRAVENOUS
Status: COMPLETED | OUTPATIENT
Start: 2022-05-24 | End: 2022-05-24

## 2022-05-24 RX ORDER — OXYCODONE HCL 5 MG/5 ML
5 SOLUTION, ORAL ORAL
Status: COMPLETED | OUTPATIENT
Start: 2022-05-24 | End: 2022-05-24

## 2022-05-24 RX ORDER — ENOXAPARIN SODIUM 100 MG/ML
30 INJECTION SUBCUTANEOUS EVERY 12 HOURS
Status: DISCONTINUED | OUTPATIENT
Start: 2022-05-24 | End: 2022-05-25 | Stop reason: HOSPADM

## 2022-05-24 RX ORDER — OXYCODONE HCL 5 MG/5 ML
10 SOLUTION, ORAL ORAL
Status: COMPLETED | OUTPATIENT
Start: 2022-05-24 | End: 2022-05-24

## 2022-05-24 RX ORDER — HYDROMORPHONE HYDROCHLORIDE 1 MG/ML
0.5 INJECTION, SOLUTION INTRAMUSCULAR; INTRAVENOUS; SUBCUTANEOUS
Status: DISCONTINUED | OUTPATIENT
Start: 2022-05-24 | End: 2022-05-25 | Stop reason: HOSPADM

## 2022-05-24 RX ORDER — DIPHENHYDRAMINE HYDROCHLORIDE 50 MG/ML
12.5 INJECTION INTRAMUSCULAR; INTRAVENOUS
Status: DISCONTINUED | OUTPATIENT
Start: 2022-05-24 | End: 2022-05-24 | Stop reason: HOSPADM

## 2022-05-24 RX ORDER — DIPHENHYDRAMINE HYDROCHLORIDE 50 MG/ML
25 INJECTION INTRAMUSCULAR; INTRAVENOUS EVERY 6 HOURS PRN
Status: DISCONTINUED | OUTPATIENT
Start: 2022-05-24 | End: 2022-05-25 | Stop reason: HOSPADM

## 2022-05-24 RX ORDER — CELECOXIB 100 MG/1
100 CAPSULE ORAL 2 TIMES DAILY
Qty: 20 CAPSULE | Refills: 0 | Status: SHIPPED | OUTPATIENT
Start: 2022-05-24 | End: 2022-07-05

## 2022-05-24 RX ORDER — BUPIVACAINE HYDROCHLORIDE AND EPINEPHRINE 5; 5 MG/ML; UG/ML
INJECTION, SOLUTION EPIDURAL; INTRACAUDAL; PERINEURAL
Status: DISCONTINUED | OUTPATIENT
Start: 2022-05-24 | End: 2022-05-24 | Stop reason: HOSPADM

## 2022-05-24 RX ORDER — HYDROMORPHONE HYDROCHLORIDE 1 MG/ML
0.2 INJECTION, SOLUTION INTRAMUSCULAR; INTRAVENOUS; SUBCUTANEOUS
Status: DISCONTINUED | OUTPATIENT
Start: 2022-05-24 | End: 2022-05-24 | Stop reason: HOSPADM

## 2022-05-24 RX ORDER — POLYETHYLENE GLYCOL 3350 17 G/17G
17 POWDER ORAL DAILY
Qty: 510 G | Refills: 0 | Status: SHIPPED | OUTPATIENT
Start: 2022-05-24 | End: 2022-09-01

## 2022-05-24 RX ORDER — SUMATRIPTAN 25 MG/1
25 TABLET, FILM COATED ORAL
Status: DISCONTINUED | OUTPATIENT
Start: 2022-05-24 | End: 2022-05-25 | Stop reason: HOSPADM

## 2022-05-24 RX ORDER — ACETAMINOPHEN 10 MG/ML
1000 INJECTION, SOLUTION INTRAVENOUS EVERY 6 HOURS
Status: COMPLETED | OUTPATIENT
Start: 2022-05-24 | End: 2022-05-25

## 2022-05-24 RX ORDER — OXYCODONE HCL 5 MG/5 ML
5 SOLUTION, ORAL ORAL EVERY 4 HOURS PRN
Qty: 100 ML | Refills: 0 | Status: SHIPPED | OUTPATIENT
Start: 2022-05-24 | End: 2022-05-29

## 2022-05-24 RX ORDER — MEPERIDINE HYDROCHLORIDE 25 MG/ML
12.5 INJECTION INTRAMUSCULAR; INTRAVENOUS; SUBCUTANEOUS
Status: DISCONTINUED | OUTPATIENT
Start: 2022-05-24 | End: 2022-05-24 | Stop reason: HOSPADM

## 2022-05-24 RX ORDER — SCOLOPAMINE TRANSDERMAL SYSTEM 1 MG/1
1 PATCH, EXTENDED RELEASE TRANSDERMAL
Status: DISCONTINUED | OUTPATIENT
Start: 2022-05-24 | End: 2022-05-25 | Stop reason: HOSPADM

## 2022-05-24 RX ORDER — SODIUM CHLORIDE, SODIUM LACTATE, POTASSIUM CHLORIDE, CALCIUM CHLORIDE 600; 310; 30; 20 MG/100ML; MG/100ML; MG/100ML; MG/100ML
INJECTION, SOLUTION INTRAVENOUS CONTINUOUS
Status: ACTIVE | OUTPATIENT
Start: 2022-05-24 | End: 2022-05-24

## 2022-05-24 RX ORDER — KETAMINE HYDROCHLORIDE 50 MG/ML
INJECTION, SOLUTION INTRAMUSCULAR; INTRAVENOUS PRN
Status: DISCONTINUED | OUTPATIENT
Start: 2022-05-24 | End: 2022-05-24 | Stop reason: HOSPADM

## 2022-05-24 RX ORDER — ONDANSETRON 2 MG/ML
INJECTION INTRAMUSCULAR; INTRAVENOUS PRN
Status: DISCONTINUED | OUTPATIENT
Start: 2022-05-24 | End: 2022-05-24 | Stop reason: SURG

## 2022-05-24 RX ORDER — ACETAMINOPHEN 500 MG
1000 TABLET ORAL EVERY 6 HOURS
Status: DISCONTINUED | OUTPATIENT
Start: 2022-05-29 | End: 2022-05-25 | Stop reason: HOSPADM

## 2022-05-24 RX ORDER — ONDANSETRON 4 MG/1
4 TABLET, ORALLY DISINTEGRATING ORAL EVERY 6 HOURS PRN
Qty: 18 TABLET | Refills: 0 | Status: SHIPPED | OUTPATIENT
Start: 2022-05-24 | End: 2022-07-05

## 2022-05-24 RX ORDER — DIPHENHYDRAMINE HCL 25 MG
25 TABLET ORAL EVERY 6 HOURS PRN
Status: DISCONTINUED | OUTPATIENT
Start: 2022-05-24 | End: 2022-05-25 | Stop reason: HOSPADM

## 2022-05-24 RX ORDER — OMEPRAZOLE 20 MG/1
20 CAPSULE, DELAYED RELEASE ORAL DAILY
Qty: 30 CAPSULE | Refills: 11 | Status: SHIPPED | OUTPATIENT
Start: 2022-05-24 | End: 2022-07-05

## 2022-05-24 RX ORDER — OXYCODONE HCL 5 MG/5 ML
5 SOLUTION, ORAL ORAL
Status: DISCONTINUED | OUTPATIENT
Start: 2022-05-24 | End: 2022-05-25 | Stop reason: HOSPADM

## 2022-05-24 RX ORDER — HALOPERIDOL 5 MG/ML
1 INJECTION INTRAMUSCULAR
Status: COMPLETED | OUTPATIENT
Start: 2022-05-24 | End: 2022-05-24

## 2022-05-24 RX ORDER — SODIUM CHLORIDE, SODIUM LACTATE, POTASSIUM CHLORIDE, AND CALCIUM CHLORIDE .6; .31; .03; .02 G/100ML; G/100ML; G/100ML; G/100ML
500 INJECTION, SOLUTION INTRAVENOUS
Status: DISCONTINUED | OUTPATIENT
Start: 2022-05-24 | End: 2022-05-25 | Stop reason: HOSPADM

## 2022-05-24 RX ORDER — CEFAZOLIN SODIUM 1 G/3ML
INJECTION, POWDER, FOR SOLUTION INTRAMUSCULAR; INTRAVENOUS PRN
Status: DISCONTINUED | OUTPATIENT
Start: 2022-05-24 | End: 2022-05-24 | Stop reason: SURG

## 2022-05-24 RX ORDER — OXYCODONE HCL 5 MG/5 ML
10 SOLUTION, ORAL ORAL
Status: DISCONTINUED | OUTPATIENT
Start: 2022-05-24 | End: 2022-05-25 | Stop reason: HOSPADM

## 2022-05-24 RX ORDER — DIPHENHYDRAMINE HYDROCHLORIDE 50 MG/ML
12.5 INJECTION INTRAMUSCULAR; INTRAVENOUS EVERY 6 HOURS PRN
Status: DISCONTINUED | OUTPATIENT
Start: 2022-05-24 | End: 2022-05-25 | Stop reason: HOSPADM

## 2022-05-24 RX ADMIN — HYDROMORPHONE HYDROCHLORIDE 0.2 MG: 1 INJECTION, SOLUTION INTRAMUSCULAR; INTRAVENOUS; SUBCUTANEOUS at 15:30

## 2022-05-24 RX ADMIN — KETAMINE HYDROCHLORIDE 100 MG: 50 INJECTION INTRAMUSCULAR; INTRAVENOUS at 12:17

## 2022-05-24 RX ADMIN — ACETAMINOPHEN 1 G: 10 INJECTION, SOLUTION INTRAVENOUS at 10:02

## 2022-05-24 RX ADMIN — HYDROMORPHONE HYDROCHLORIDE 0.2 MG: 1 INJECTION, SOLUTION INTRAMUSCULAR; INTRAVENOUS; SUBCUTANEOUS at 14:55

## 2022-05-24 RX ADMIN — FENTANYL CITRATE 50 MCG: 50 INJECTION, SOLUTION INTRAMUSCULAR; INTRAVENOUS at 14:00

## 2022-05-24 RX ADMIN — ROCURONIUM BROMIDE 50 MG: 10 INJECTION, SOLUTION INTRAVENOUS at 12:06

## 2022-05-24 RX ADMIN — HALOPERIDOL LACTATE 1 MG: 5 INJECTION, SOLUTION INTRAMUSCULAR at 13:45

## 2022-05-24 RX ADMIN — SODIUM CHLORIDE, POTASSIUM CHLORIDE, SODIUM LACTATE AND CALCIUM CHLORIDE: 600; 310; 30; 20 INJECTION, SOLUTION INTRAVENOUS at 10:01

## 2022-05-24 RX ADMIN — FENTANYL CITRATE 50 MCG: 50 INJECTION, SOLUTION INTRAMUSCULAR; INTRAVENOUS at 12:17

## 2022-05-24 RX ADMIN — POTASSIUM CHLORIDE, DEXTROSE MONOHYDRATE AND SODIUM CHLORIDE: 150; 5; 450 INJECTION, SOLUTION INTRAVENOUS at 17:14

## 2022-05-24 RX ADMIN — CEFAZOLIN 2 G: 330 INJECTION, POWDER, FOR SOLUTION INTRAMUSCULAR; INTRAVENOUS at 12:06

## 2022-05-24 RX ADMIN — MEPERIDINE HYDROCHLORIDE 12.5 MG: 25 INJECTION INTRAMUSCULAR; INTRAVENOUS; SUBCUTANEOUS at 14:02

## 2022-05-24 RX ADMIN — PROPOFOL 200 MG: 10 INJECTION, EMULSION INTRAVENOUS at 12:06

## 2022-05-24 RX ADMIN — AMPICILLIN SODIUM 2000 MG: 2 INJECTION, POWDER, FOR SOLUTION INTRAMUSCULAR; INTRAVENOUS at 18:10

## 2022-05-24 RX ADMIN — ONDANSETRON 4 MG: 2 INJECTION INTRAMUSCULAR; INTRAVENOUS at 13:23

## 2022-05-24 RX ADMIN — MIDAZOLAM HYDROCHLORIDE 2 MG: 1 INJECTION, SOLUTION INTRAMUSCULAR; INTRAVENOUS at 11:59

## 2022-05-24 RX ADMIN — SODIUM CHLORIDE, POTASSIUM CHLORIDE, SODIUM LACTATE AND CALCIUM CHLORIDE: 600; 310; 30; 20 INJECTION, SOLUTION INTRAVENOUS at 12:01

## 2022-05-24 RX ADMIN — FENTANYL CITRATE 50 MCG: 50 INJECTION, SOLUTION INTRAMUSCULAR; INTRAVENOUS at 12:49

## 2022-05-24 RX ADMIN — SUGAMMADEX 200 MG: 100 INJECTION, SOLUTION INTRAVENOUS at 13:00

## 2022-05-24 RX ADMIN — HALOPERIDOL LACTATE 1 MG: 5 INJECTION, SOLUTION INTRAMUSCULAR at 14:15

## 2022-05-24 RX ADMIN — FENTANYL CITRATE 50 MCG: 50 INJECTION, SOLUTION INTRAMUSCULAR; INTRAVENOUS at 13:23

## 2022-05-24 RX ADMIN — ACETAMINOPHEN 1000 MG: 10 INJECTION, SOLUTION INTRAVENOUS at 17:14

## 2022-05-24 RX ADMIN — SCOPALAMINE 1 PATCH: 1 PATCH, EXTENDED RELEASE TRANSDERMAL at 10:01

## 2022-05-24 RX ADMIN — ONDANSETRON 4 MG: 2 INJECTION INTRAMUSCULAR; INTRAVENOUS at 12:06

## 2022-05-24 RX ADMIN — ENOXAPARIN SODIUM 30 MG: 30 INJECTION SUBCUTANEOUS at 17:14

## 2022-05-24 RX ADMIN — DIPHENHYDRAMINE HYDROCHLORIDE 12.5 MG: 50 INJECTION INTRAMUSCULAR; INTRAVENOUS at 15:40

## 2022-05-24 RX ADMIN — ONDANSETRON 4 MG: 2 INJECTION INTRAMUSCULAR; INTRAVENOUS at 18:15

## 2022-05-24 ASSESSMENT — PAIN DESCRIPTION - PAIN TYPE
TYPE: SURGICAL PAIN

## 2022-05-24 ASSESSMENT — COGNITIVE AND FUNCTIONAL STATUS - GENERAL
SUGGESTED CMS G CODE MODIFIER MOBILITY: CH
DAILY ACTIVITIY SCORE: 24
MOBILITY SCORE: 24
SUGGESTED CMS G CODE MODIFIER DAILY ACTIVITY: CH

## 2022-05-24 ASSESSMENT — LIFESTYLE VARIABLES
TOTAL SCORE: 0
HAVE PEOPLE ANNOYED YOU BY CRITICIZING YOUR DRINKING: NO
HOW MANY TIMES IN THE PAST YEAR HAVE YOU HAD 5 OR MORE DRINKS IN A DAY: 0
ON A TYPICAL DAY WHEN YOU DRINK ALCOHOL HOW MANY DRINKS DO YOU HAVE: 0
ALCOHOL_USE: NO
TOTAL SCORE: 0
TOTAL SCORE: 0
AVERAGE NUMBER OF DAYS PER WEEK YOU HAVE A DRINK CONTAINING ALCOHOL: 0
DOES PATIENT WANT TO STOP DRINKING: NO
HAVE YOU EVER FELT YOU SHOULD CUT DOWN ON YOUR DRINKING: NO
EVER HAD A DRINK FIRST THING IN THE MORNING TO STEADY YOUR NERVES TO GET RID OF A HANGOVER: NO
EVER FELT BAD OR GUILTY ABOUT YOUR DRINKING: NO
CONSUMPTION TOTAL: NEGATIVE

## 2022-05-24 ASSESSMENT — PATIENT HEALTH QUESTIONNAIRE - PHQ9
SUM OF ALL RESPONSES TO PHQ9 QUESTIONS 1 AND 2: 0
2. FEELING DOWN, DEPRESSED, IRRITABLE, OR HOPELESS: NOT AT ALL
1. LITTLE INTEREST OR PLEASURE IN DOING THINGS: NOT AT ALL

## 2022-05-24 ASSESSMENT — FIBROSIS 4 INDEX: FIB4 SCORE: 0.44

## 2022-05-24 NOTE — PROGRESS NOTES
Report received.  Assessment complete.  A&O x 4. Patient calls appropriately.  Patient up with SB assist.   Patient has 8/10 pain with movement. Pt states pain is improving now that she is lying in bed.  Patient reporting Nausea, previously medicated, scant amount of bloody emesis present on napkin.  + void, - flatus, - BM.  Patient denies SOB.  SCD's on.  Review plan with of care with patient. Call light and personal belongings with in reach. Hourly rounding in place. All needs met at this time.

## 2022-05-24 NOTE — OR NURSING
1318: Pt arrived from OR, handoff received from anesthesiologist and RN. Five lap sites to abdomen with demabond, C/D/I-ASHLEIGH. Patient asleep, OPA in place. VSS.     1330: Patient awakened to voice, following commands, moving all extremitates, OPA removed. Medicated for pain and nausea per MAR.     1400: Patient's s/o updated on status.     1500: Phase 1 completed, awaiting room assignment. Patient belongings retrieved from locker ( one belongings bag, 1 pink purse).     1530: Report given to T4 RNDee.

## 2022-05-24 NOTE — ANESTHESIA POSTPROCEDURE EVALUATION
Patient: Dee Cornejo    Procedure Summary     Date: 05/24/22 Room / Location: [facility] 08 / SURGERY [facility]    Anesthesia Start: 1201 Anesthesia Stop: 1317    Procedure: GASTRECTOMY, SLEEVE, LAPAROSCOPIC (N/A Abdomen) Diagnosis: (MORBID OBESITY)    Surgeons: Roderick Enriquez M.D. Responsible Provider: Tobey Gansert, M.D.    Anesthesia Type: general ASA Status: 1          Final Anesthesia Type: general  Last vitals  BP   Blood Pressure: 123/59    Temp   36.6 °C (97.8 °F)    Pulse   (!) 101   Resp   15    SpO2   99 %      Anesthesia Post Evaluation    Patient location during evaluation: PACU  Patient participation: complete - patient participated  Level of consciousness: awake and alert    Airway patency: patent  Anesthetic complications: no  Cardiovascular status: hemodynamically stable  Respiratory status: acceptable  Hydration status: euvolemic    PONV: none          No complications documented.     Nurse Pain Score: 8 (NPRS)         Detail Level: Detailed
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
## 2022-05-24 NOTE — ANESTHESIA PROCEDURE NOTES
Airway    Date/Time: 5/24/2022 12:06 PM  Performed by: Tobey Gansert, M.D.  Authorized by: Tobey Gansert, M.D.     Location:  OR  Urgency:  Elective  Indications for Airway Management:  Anesthesia      Spontaneous Ventilation: absent    Sedation Level:  Deep  Preoxygenated: Yes    Patient Position:  Sniffing  Final Airway Type:  Endotracheal airway  Final Endotracheal Airway:  ETT  Cuffed: Yes    Technique Used for Successful ETT Placement:  Direct laryngoscopy    Insertion Site:  Oral  Blade Type:  Deann  Laryngoscope Blade/Videolaryngoscope Blade Size:  3  ETT Size (mm):  7.0  Measured from:  Teeth  ETT to Teeth (cm):  22  Placement Verified by: auscultation and capnometry    Cormack-Lehane Classification:  Grade IIa - partial view of glottis  Number of Attempts at Approach:  1

## 2022-05-24 NOTE — ANESTHESIA TIME REPORT
Anesthesia Start and Stop Event Times     Date Time Event    5/24/2022 1156 Ready for Procedure     1201 Anesthesia Start     1317 Anesthesia Stop        Responsible Staff  05/24/22    Name Role Begin End    Tobey Gansert, M.D. Anesth 1201 1317        Overtime Reason:  no overtime (within assigned shift)    Comments:

## 2022-05-24 NOTE — DISCHARGE INSTRUCTIONS
Discharge Instructions    Discharged to home by car with relative. Discharged via wheelchair, hospital escort: Yes.  Special equipment needed: Not Applicable    Be sure to schedule a follow-up appointment with your primary care doctor or any specialists as instructed.     Discharge Plan:   Diet Plan: Discussed  Activity Level: Discussed  Confirmed Follow up Appointment: Patient to Call and Schedule Appointment  Confirmed Symptoms Management: Discussed  Medication Reconciliation Updated: Yes    I understand that a diet low in cholesterol, fat, and sodium is recommended for good health. Unless I have been given specific instructions below for another diet, I accept this instruction as my diet prescription.   Other diet: regular    Special Instructions: None    Is patient discharged on Warfarin / Coumadin?   No     Depression / Suicide Risk    As you are discharged from this [facility] facility, it is important to learn how to keep safe from harming yourself.    Recognize the warning signs:  Abrupt changes in personality, positive or negative- including increase in energy   Giving away possessions  Change in eating patterns- significant weight changes-  positive or negative  Change in sleeping patterns- unable to sleep or sleeping all the time   Unwillingness or inability to communicate  Depression  Unusual sadness, discouragement and loneliness  Talk of wanting to die  Neglect of personal appearance   Rebelliousness- reckless behavior  Withdrawal from people/activities they love  Confusion- inability to concentrate     If you or a loved one observes any of these behaviors or has concerns about self-harm, here's what you can do:  Talk about it- your feelings and reasons for harming yourself  Remove any means that you might use to hurt yourself (examples: pills, rope, extension cords, firearm)  Get professional help from the community (Mental Health, Substance Abuse, psychological counseling)  Do not be alone:Call  your Safe Contact- someone whom you trust who will be there for you.  Call your local CRISIS HOTLINE 401-9445 or 270-243-1238  Call your local Children's Mobile Crisis Response Team [place] (284) 996-7569 or www.[employer]  Call the toll free National Suicide Prevention Hotlines   National Suicide Prevention Lifeline 485-370-WIIY (5905)  [facility] Line Network 800-SUICIDE (570-0367)      D/C instructions:    DIET: Upon discharge from the hospital begin post-operative diet as discussed in your bariatric handbook    ACTIVITIES: After discharge from the hospital, you may resume full routine activities. However, there should be no heavy lifting (greater than 15 pounds) and no strenuous activities until after your follow-up visit. Otherwise, routine activities of daily living are acceptable.    DRIVING: If you drive, you may drive whenever you are off pain medications and are able to perform the activities needed to drive, i.e. turning, bending, twisting, etc.    BATHING: You may get the wound wet at any time after leaving the hospital. You may shower, but do not submerge in a bath for at least a week. Dressings may come off after 48 hours.    PAIN MEDICATION: You will be given a prescription for pain medication at discharge. Please take these as directed. It is important to remember not to take medications on an empty stomach as this may cause nausea.    CALL IF YOU HAVE: (1) Fevers to more than 1010 F, (2) Unusual chest or leg pain, (3) Drainage or fluid from incision that may be foul smelling, increased tenderness or soreness at the wound or the wound edges are no longer together, redness or swelling at the incision site. Please do not hesitate to call with any other questions.     APPOINTMENT: Contact our office at [phone] for a follow-up appointment in 1 week following your procedure.    If you have any additional questions, please do not hesitate to call the office.    Office address:  [address], [address] [place] [address]

## 2022-05-24 NOTE — ANESTHESIA PREPROCEDURE EVALUATION
Case: [id number] Date/Time: 05/24/22 1045    Procedures:       GASTRECTOMY, SLEEVE, LAPAROSCOPIC (N/A Abdomen)      REPAIR, HERNIA, HIATAL, LAPAROSCOPIC (N/A Abdomen)    Anesthesia type: General    Pre-op diagnosis: MORBID OBESITY    Location: [place] [address] / SURGERY [facility]    Surgeons: Roderick Enriquez M.D.          Relevant Problems   No relevant active problems       Physical Exam    Airway   Mallampati: II  TM distance: >3 FB  Neck ROM: full       Cardiovascular - normal exam  Rhythm: regular  Rate: normal  (-) murmur     Dental - normal exam           Pulmonary - normal exam  Breath sounds clear to auscultation     Abdominal    Neurological - normal exam                 Anesthesia Plan    ASA 1       Plan - general       Airway plan will be ETT          Induction: intravenous    Postoperative Plan: Postoperative administration of opioids is intended.    Pertinent diagnostic labs and testing reviewed    Informed Consent:    Anesthetic plan and risks discussed with patient.    Use of blood products discussed with: patient whom consented to blood products.

## 2022-05-24 NOTE — PROGRESS NOTES
4 Eyes Skin Assessment Completed by Dee, RN and Lester, RN.    Head WDL  Ears WDL  Nose WDL  Mouth WDL  Neck WDL  Breast/Chest WDL  Shoulder Blades WDL  Spine WDL  (R) Arm/Elbow/Hand WDL  (L) Arm/Elbow/Hand WDL  Abdomen Incision x5 lap sites with dermabond ASHLEIGH CDI  Groin WDL  Scrotum/Coccyx/Buttocks WDL  (R) Leg WDL  (L) Leg WDL  (R) Heel/Foot/Toe WDL  (L) Heel/Foot/Toe WDL          Devices In Places Blood Pressure Cuff, Pulse Ox, SCD's and Nasal Cannula      Interventions In Place Pillows    Possible Skin Injury No    Pictures Uploaded Into Epic N/A  Wound Consult Placed N/A  RN Wound Prevention Protocol Ordered No

## 2022-05-25 ENCOUNTER — PHARMACY VISIT (OUTPATIENT)
Dept: PHARMACY | Facility: MEDICAL CENTER | Age: 32
End: 2022-05-25
Payer: COMMERCIAL

## 2022-05-25 ENCOUNTER — PATIENT OUTREACH (OUTPATIENT)
Dept: SCHEDULING | Facility: IMAGING CENTER | Age: 32
End: 2022-05-25
Payer: COMMERCIAL

## 2022-05-25 VITALS
BODY MASS INDEX: 37.52 KG/M2 | RESPIRATION RATE: 17 BRPM | HEIGHT: 64 IN | DIASTOLIC BLOOD PRESSURE: 68 MMHG | TEMPERATURE: 99 F | HEART RATE: 74 BPM | SYSTOLIC BLOOD PRESSURE: 113 MMHG | OXYGEN SATURATION: 92 % | WEIGHT: 219.8 LBS

## 2022-05-25 LAB
ALBUMIN SERPL BCP-MCNC: 4.1 G/DL (ref 3.2–4.9)
ALBUMIN/GLOB SERPL: 1.5 G/DL
ALP SERPL-CCNC: 71 U/L (ref 30–99)
ALT SERPL-CCNC: 37 U/L (ref 2–50)
ANION GAP SERPL CALC-SCNC: 11 MMOL/L (ref 7–16)
AST SERPL-CCNC: 32 U/L (ref 12–45)
BILIRUB SERPL-MCNC: 0.8 MG/DL (ref 0.1–1.5)
BUN SERPL-MCNC: 5 MG/DL (ref 8–22)
CALCIUM SERPL-MCNC: 8.6 MG/DL (ref 8.5–10.5)
CHLORIDE SERPL-SCNC: 100 MMOL/L (ref 96–112)
CO2 SERPL-SCNC: 23 MMOL/L (ref 20–33)
CREAT SERPL-MCNC: 0.53 MG/DL (ref 0.5–1.4)
ERYTHROCYTE [DISTWIDTH] IN BLOOD BY AUTOMATED COUNT: 40.4 FL (ref 35.9–50)
GFR SERPLBLD CREATININE-BSD FMLA CKD-EPI: 125 ML/MIN/1.73 M 2
GLOBULIN SER CALC-MCNC: 2.7 G/DL (ref 1.9–3.5)
GLUCOSE SERPL-MCNC: 103 MG/DL (ref 65–99)
HCT VFR BLD AUTO: 42.1 % (ref 37–47)
HGB BLD-MCNC: 14.8 G/DL (ref 12–16)
MCH RBC QN AUTO: 32.5 PG (ref 27–33)
MCHC RBC AUTO-ENTMCNC: 35.2 G/DL (ref 33.6–35)
MCV RBC AUTO: 92.5 FL (ref 81.4–97.8)
PLATELET # BLD AUTO: 290 K/UL (ref 164–446)
PMV BLD AUTO: 9.2 FL (ref 9–12.9)
POTASSIUM SERPL-SCNC: 3.7 MMOL/L (ref 3.6–5.5)
PROT SERPL-MCNC: 6.8 G/DL (ref 6–8.2)
RBC # BLD AUTO: 4.55 M/UL (ref 4.2–5.4)
SODIUM SERPL-SCNC: 134 MMOL/L (ref 135–145)
WBC # BLD AUTO: 14.6 K/UL (ref 4.8–10.8)

## 2022-05-25 PROCEDURE — 700101 HCHG RX REV CODE 250: Performed by: SURGERY

## 2022-05-25 PROCEDURE — 80053 COMPREHEN METABOLIC PANEL: CPT

## 2022-05-25 PROCEDURE — 85027 COMPLETE CBC AUTOMATED: CPT

## 2022-05-25 PROCEDURE — 700111 HCHG RX REV CODE 636 W/ 250 OVERRIDE (IP): Performed by: OBSTETRICS & GYNECOLOGY

## 2022-05-25 PROCEDURE — 700105 HCHG RX REV CODE 258: Performed by: OBSTETRICS & GYNECOLOGY

## 2022-05-25 PROCEDURE — 36415 COLL VENOUS BLD VENIPUNCTURE: CPT

## 2022-05-25 PROCEDURE — 700102 HCHG RX REV CODE 250 W/ 637 OVERRIDE(OP): Performed by: SURGERY

## 2022-05-25 PROCEDURE — 700111 HCHG RX REV CODE 636 W/ 250 OVERRIDE (IP): Performed by: SURGERY

## 2022-05-25 PROCEDURE — A9270 NON-COVERED ITEM OR SERVICE: HCPCS | Performed by: SURGERY

## 2022-05-25 RX ADMIN — HALOPERIDOL LACTATE 1 MG: 5 INJECTION, SOLUTION INTRAMUSCULAR at 06:25

## 2022-05-25 RX ADMIN — AMPICILLIN SODIUM 2000 MG: 2 INJECTION, POWDER, FOR SOLUTION INTRAMUSCULAR; INTRAVENOUS at 12:02

## 2022-05-25 RX ADMIN — ACETAMINOPHEN 1000 MG: 10 INJECTION, SOLUTION INTRAVENOUS at 00:28

## 2022-05-25 RX ADMIN — ACETAMINOPHEN 1000 MG: 500 TABLET ORAL at 06:23

## 2022-05-25 RX ADMIN — ONDANSETRON 4 MG: 2 INJECTION INTRAMUSCULAR; INTRAVENOUS at 02:35

## 2022-05-25 RX ADMIN — AMPICILLIN SODIUM 2000 MG: 2 INJECTION, POWDER, FOR SOLUTION INTRAMUSCULAR; INTRAVENOUS at 06:10

## 2022-05-25 RX ADMIN — ENOXAPARIN SODIUM 30 MG: 30 INJECTION SUBCUTANEOUS at 06:25

## 2022-05-25 RX ADMIN — ONDANSETRON 4 MG: 2 INJECTION INTRAMUSCULAR; INTRAVENOUS at 15:13

## 2022-05-25 RX ADMIN — ACETAMINOPHEN 1000 MG: 500 TABLET ORAL at 15:13

## 2022-05-25 RX ADMIN — POTASSIUM CHLORIDE, DEXTROSE MONOHYDRATE AND SODIUM CHLORIDE: 150; 5; 450 INJECTION, SOLUTION INTRAVENOUS at 12:02

## 2022-05-25 RX ADMIN — ONDANSETRON 4 MG: 2 INJECTION INTRAMUSCULAR; INTRAVENOUS at 09:22

## 2022-05-25 RX ADMIN — AMPICILLIN SODIUM 2000 MG: 2 INJECTION, POWDER, FOR SOLUTION INTRAMUSCULAR; INTRAVENOUS at 00:17

## 2022-05-25 ASSESSMENT — PAIN DESCRIPTION - PAIN TYPE: TYPE: SURGICAL PAIN

## 2022-05-25 NOTE — PROGRESS NOTES
Patient discharged to home with family and staff escort. IV discontinued. Prescriptions given to patient, verbalized understanding, consent signed and in chart. Discharge instructions given.  Education provided, patient asked questions and verbalized understanding. Discharge paperwork signed and in chart. Patient is tolerating diet, stable when ambulating, and pain is well controlled. All belongings collected. No further questions or concerns at this time.

## 2022-05-25 NOTE — PROGRESS NOTES
Report received at bedside from day shift RN  Assumed care. Pt in bed A/O x4. VSS.   Responds appropriately.   Denies pain, SOB, denies chest pain  N/V present, scant bloody emesis, patient medicated per MAR, provided non pharmacological interventions for comfort  Patient tolerating bariatric clear diet  5 lap sites to abdomen, dermabond/ASHLEIGH c/d/i  1L O2 NC, adequate oxygenation noted  Patient ambulates SBA  Last BM PTA, +void, -flatus  Assessment complete.  Discussed POC, pt verbalizes understanding.   Explained importance of calling before getting OOB. Call light and belongings within reach.   Bed alarm off, pt low fall risk per chris arvizu. Bed in the lowest position, SCDs on  Treaded socks in place.   Hourly rounding in progress.   Will continue to monitor, all needs met.

## 2022-05-25 NOTE — PROGRESS NOTES
Received report from previous shift RN  Assessment complete.  A&O x 4. Patient calls appropriately.  Patient ambulates with no assist.   Patient has 3/10 pain. Pain managed with prescribed medications.  Nausea slightly improved today, medicated per MAR  Surgical lap sites x5 with dermabond ASHLEIGH.  + void, - flatus, PTA BM.  Patient denies SOB.  Patient okay to d/c later today if nausea under control and tolerates diet per MD.  Review plan with of care with patient. Call light and personal belongings with in reach. Hourly rounding in place. All needs met at this time.

## 2022-05-25 NOTE — DISCHARGE SUMMARY
Discharge Summary    CHIEF COMPLAINT ON ADMISSION  No chief complaint on file.      Reason for Admission  MORBID OBESITY     Admission Date  5/24/2022    CODE STATUS  Full Code    HPI & HOSPITAL COURSE  This is a 31 y.o. female here with morbid obesity status post laparoscopic sleeve gastrectomy. Patient is doing well on postop day #1 she is ambulating well, she is tolerating clear liquid diet and pain is controlled with oral medication    Therefore, she is discharged in good and stable condition to home with close outpatient follow-up.    The patient recovered much more quickly than anticipated on admission.    Discharge Date  05/25/22    FOLLOW UP ITEMS POST DISCHARGE  none    DISCHARGE DIAGNOSES  Principal Problem:    Morbid obesity (HCC) POA: Yes  Active Problems:    Postoperative pain POA: Unknown  Resolved Problems:    * No resolved hospital problems. *      FOLLOW UP  Future Appointments   Date Time Provider Department Center   6/6/2022  7:30 AM [facility]   6/6/2022  8:00 AM [facility]   6/10/2022  4:00 PM Vinay Vital M.D. [facility]   6/22/2022  7:00 AM Alex Jean Baptiste M.D. [facility]     Roderick Enriquez M.D.  [address] [id number] [phone]    Schedule an appointment as soon as possible for a visit in 2 week(s)        MEDICATIONS ON DISCHARGE     Medication List      START taking these medications      Instructions   celecoxib 100 MG Caps  Commonly known as: CELEBREX   Take 1 Capsule by mouth 2 times a day. Take scheduled for 3 days then as needed for pain after  Dose: 100 mg     omeprazole 20 MG delayed-release capsule  Commonly known as: PRILOSEC   Doctor's comments: Open capsule and mix in 10cc H2O  Take 1 Capsule by mouth every day. Open capsule and mix in 10cc H2O  Dose: 20 mg     ondansetron 4 MG Tbdp  Commonly known as: ZOFRAN ODT   Dissolve 1 Tablet by mouth every 6 hours as needed for Nausea.  Dose: 4 mg      oxyCODONE 5 MG/5ML solution  Commonly known as: ROXICODONE   Take 5 mL by mouth every four hours as needed for Severe Pain for up to 4 days.  Dose: 5 mg     PEG 3350 17 GM/SCOOP Powd   Mix and drink 17gm by mouth every day. While taking narcotics, hold if greater then 3 BMs a day  Dose: 17 g        CHANGE how you take these medications      Instructions   acetaminophen 325 MG Tabs  What changed:   · medication strength  · how much to take  · how to take this  · when to take this  Commonly known as: Tylenol   Take 2 Tablets by mouth every 6 hours. take scheduled for 3 days post-op then as needed after  Dose: 650 mg        CONTINUE taking these medications      Instructions   albuterol 108 (90 Base) MCG/ACT Aers inhalation aerosol   Inhale 2 Puffs every four hours as needed for Shortness of Breath.  Dose: 2 Puff     benzonatate 100 MG Caps  Commonly known as: TESSALON   Take 1 Capsule by mouth 3 times a day as needed.  Dose: 100 mg     cyclobenzaprine 5 mg tablet  Commonly known as: Flexeril   Take 1-2 Tablets by mouth 3 times a day as needed.  Dose: 5-10 mg     diazePAM 5 MG Tabs  Commonly known as: VALIUM      SUMAtriptan 25 MG Tabs tablet  Commonly known as: IMITREX   Take 1 Tablet by mouth one time as needed for Migraine for up to 1 dose.  Dose: 25 mg        STOP taking these medications    meloxicam 15 MG tablet  Commonly known as: MOBIC     naproxen 500 MG Tabs  Commonly known as: NAPROSYN     PRENATAL 1 PO            Allergies  Allergies   Allergen Reactions   • Nkda [No Known Drug Allergy]        DIET  Orders Placed This Encounter   Procedures   • Diet Order Diet: Clear Liquid; Miscellaneous modifications: (optional): Bariatric     Standing Status:   Standing     Number of Occurrences:   1     Order Specific Question:   Diet:     Answer:   Clear Liquid [10]     Order Specific Question:   Miscellaneous modifications: (optional)     Answer:   Bariatric [3]       ACTIVITY  As tolerated.  Weight bearing as  tolerated    CONSULTATIONS  none    PROCEDURES  5/24/2022 laparoscopic sleeve gastrectomy    LABORATORY  Lab Results   Component Value Date    SODIUM 134 (L) 05/25/2022    POTASSIUM 3.7 05/25/2022    CHLORIDE 100 05/25/2022    CO2 23 05/25/2022    GLUCOSE 103 (H) 05/25/2022    BUN 5 (L) 05/25/2022    CREATININE 0.53 05/25/2022    CREATININE 0.8 04/05/2009        Lab Results   Component Value Date    WBC 14.6 (H) 05/25/2022    HEMOGLOBIN 14.8 05/25/2022    HEMATOCRIT 42.1 05/25/2022    PLATELETCT 290 05/25/2022        Total time of the discharge process exceeds 20 minutes.

## 2022-05-25 NOTE — DISCHARGE PLANNING
Case Management Discharge Planning    Admission Date: 5/24/2022  GMLOS: 1.4  ALOS: 1    6-Clicks ADL Score: 24  6-Clicks Mobility Score: 24      Anticipated Discharge Dispo:    Home with close OP Follow Up    DME Needed: None    Action(s) Taken:   Pt is S/P Laparoscopic Sleeve Gastrectomy on 5/24/22.  Plan is to discharge Pt to home with close OP follow up        Escalations Completed: None    Medically Clear: Yes    Next Steps:   This RN CM to continue to assist Pt with discharge as needed    Barriers to Discharge: None    Is the patient up for discharge tomorrow: No        \

## 2022-05-25 NOTE — CARE PLAN
The patient is Stable - Low risk of patient condition declining or worsening    Shift Goals  Clinical Goals: Reduction of N/V and antibiotic treatment  Patient Goals: Reduction of N/V  Family Goals: Comfort/rest    Progress made toward(s) clinical / shift goals:    Problem: Pain - Standard  Goal: Alleviation of pain or a reduction in pain to the patient’s comfort goal  Outcome: Progressing     Problem: Knowledge Deficit - Standard  Goal: Patient and family/care givers will demonstrate understanding of plan of care, disease process/condition, diagnostic tests and medications  Outcome: Progressing       Patient is not progressing towards the following goals:

## 2022-05-25 NOTE — PROGRESS NOTES
"       S: Dee Cornejo  is a 31 y.o.  female admitted for morbid obesity status post laparoscopic sleeve gastrectomy.  Patient's pain is well controlled overnight she does have some nausea vomiting which is limited her p.o. intake.  She has been ambulating some in the room.      O:  /61   Pulse 81   Temp 37.1 °C (98.7 °F) (Temporal)   Resp 17   Ht 1.626 m (5' 4\")   Wt 99.7 kg (219 lb 12.8 oz)   SpO2 93%   Intake/Output                             05/23/22 0700 - 05/24/22 0659 (Not Admitted) 05/24/22 0700 - 05/25/22 0659 05/25/22 0700 - 05/26/22 0659     [id number] 6191-0875 Total [id number] 3727-5825 Total [id number] 9733-6060 Total                    Intake    P.O.  --  -- --  --  60 60  --  -- --    P.O. -- -- -- -- 60 60 -- -- --    I.V.  --  -- --  1000  -- 1000  --  -- --    Volume (mL) (Lactated Ringers) -- -- -- 1000 -- 1000 -- -- --    Total Intake -- -- -- 1000 60 1060 -- -- --       Output    Urine  --  -- --  500  0 500  --  -- --    Number of Times Voided -- -- -- -- 2 x 2 x -- -- --    Urine Void (mL) -- -- -- 500 0 500 -- -- --    Emesis  --  -- --  --  100 100  --  -- --    Emesis -- -- -- -- 100 100 -- -- --    Emesis - Number of Times -- -- -- -- 2 x 2 x -- -- --    Stool  --  -- --  --  0 0  --  -- --    Number of Times Stooled -- -- -- -- 0 x 0 x -- -- --    Measurable Stool (mL) -- -- -- -- 0 0 -- -- --    Blood  --  -- --  50  -- 50  --  -- --    Est. Blood Loss -- -- -- 50 -- 50 -- -- --    Total Output -- -- -- 550 100 650 -- -- --       Net I/O     -- -- -- 450 -40 410 -- -- --        Recent Labs     05/25/22 0139   SODIUM 134*   POTASSIUM 3.7   CHLORIDE 100   CO2 23   GLUCOSE 103*   BUN 5*   CREATININE 0.53   CALCIUM 8.6     Recent Labs     05/25/22 0139   WBC 14.6*   RBC 4.55   HEMOGLOBIN 14.8   HEMATOCRIT 42.1   MCV 92.5   MCH 32.5   MCHC 35.2*   RDW 40.4   PLATELETCT 290   MPV 9.2       Alert and Oriented x3, No Acute Distress  Normal Respiratory Effort  Abdomen soft, " appropriately tender  Incisions/Bandages clean/dry/intact  Extremities warm and well perfused    A/P:  Patient is doing well with nausea and vomiting limiting her p.o. intake.  Plan to see if she clears today.  Doing well if doing well this afternoon plan to discharge home if not we will hold overnight.    Roderick Enriquez MD  [facility]

## 2022-05-25 NOTE — DISCHARGE PLANNING
"[facility]/SCP TCN chart review completed. Collaborated with ELEONORA Fuentes prior to meeting with the pt. The most current review of medical record, knowledge of pt's PLOF and social support, LACE+ score of 13, 6 clicks scores of 24 mobility were considered.      Pt seen at bedside with family present. Introduced TCN program. Provided education regarding differences in post acute resources including IRF, SNF and HH. Discussed HTH/SCP plan benefits including [facility] Beds and [facility] transitional care services. Pt verbalizes understanding. Note that pt and family report current plan to dc to home with outpatient follow ups. She reports no concerns with transportation at time of dc or for follow ups. She has been ambulatory with no AD while here and reports no functional concerns with dc to home once medically cleared. Note per chart \"patient okay to d/c later today if nausea under control and tolerates diet per MD\". Given aforementioned no additional provider requests and no choice needed at this time. TCN will continue to follow and collaborate with discharge planning team as additional post acute needs arise. Thank you.   "

## 2022-06-04 ENCOUNTER — OFFICE VISIT (OUTPATIENT)
Dept: URGENT CARE | Facility: PHYSICIAN GROUP | Age: 32
End: 2022-06-04
Payer: COMMERCIAL

## 2022-06-04 VITALS
HEIGHT: 64 IN | DIASTOLIC BLOOD PRESSURE: 82 MMHG | HEART RATE: 88 BPM | OXYGEN SATURATION: 97 % | WEIGHT: 210 LBS | RESPIRATION RATE: 18 BRPM | TEMPERATURE: 98.6 F | SYSTOLIC BLOOD PRESSURE: 124 MMHG | BODY MASS INDEX: 35.85 KG/M2

## 2022-06-04 DIAGNOSIS — R52 BODY ACHES: ICD-10-CM

## 2022-06-04 DIAGNOSIS — R68.89 FLU-LIKE SYMPTOMS: ICD-10-CM

## 2022-06-04 LAB
FLUAV+FLUBV AG SPEC QL IA: NEGATIVE
INT CON NEG: NORMAL
INT CON POS: NORMAL

## 2022-06-04 PROCEDURE — 87804 INFLUENZA ASSAY W/OPTIC: CPT | Performed by: PHYSICIAN ASSISTANT

## 2022-06-04 PROCEDURE — 99213 OFFICE O/P EST LOW 20 MIN: CPT | Performed by: PHYSICIAN ASSISTANT

## 2022-06-04 RX ORDER — DEXAMETHASONE SODIUM PHOSPHATE 10 MG/ML
10 INJECTION INTRAMUSCULAR; INTRAVENOUS ONCE
Status: COMPLETED | OUTPATIENT
Start: 2022-06-04 | End: 2022-06-04

## 2022-06-04 RX ADMIN — DEXAMETHASONE SODIUM PHOSPHATE 10 MG: 10 INJECTION INTRAMUSCULAR; INTRAVENOUS at 17:27

## 2022-06-04 ASSESSMENT — ENCOUNTER SYMPTOMS
EYE DISCHARGE: 0
COUGH: 0
HEADACHES: 1
EYE REDNESS: 0
SORE THROAT: 1
DIARRHEA: 0
VOMITING: 0
NAUSEA: 1
MYALGIAS: 1
CHILLS: 1
FEVER: 0
RHINORRHEA: 1

## 2022-06-04 ASSESSMENT — FIBROSIS 4 INDEX: FIB4 SCORE: 0.58

## 2022-06-04 NOTE — PROGRESS NOTES
Subjective     Dee Cornejo is a 31 y.o. female who presents with Chills (Bodyaches x1day) and Sore Throat (Uncomortable. And causing cough. )        URI   This is a new problem. Episode onset: x 1 day ago. The problem has been unchanged. There has been no fever. Associated symptoms include congestion, headaches, nausea, rhinorrhea and a sore throat. Pertinent negatives include no coughing, diarrhea, ear pain or vomiting. She has tried nothing for the symptoms.     The patient states her mother and son are sick with similar symptoms.  The patient reports no known exposure to COVID-19 and/or influenza.  The patient states both her mother and son have been tested for COVID-19, which was negative.  The patient states she also took an at-home COVID-19 test, which was negative.  The patient is fully vaccinated against COVID-19.    The patient states she recently underwent gastric sleeve surgery x1 week ago.     PMH:  has a past medical history of COVID-19 (11/21, 1/1/22), Pain (05/02/2022), PCOS (polycystic ovarian syndrome) (05/02/2022), and UTI (lower urinary tract infection).  MEDS:   Current Outpatient Medications:   •  acetaminophen (TYLENOL) 325 MG Tab, Take 2 Tablets by mouth every 6 hours. take scheduled for 3 days post-op then as needed after, Disp: 60 Tablet, Rfl: 0  •  celecoxib (CELEBREX) 100 MG Cap, Take 1 Capsule by mouth 2 times a day. Take scheduled for 3 days then as needed for pain after, Disp: 20 Capsule, Rfl: 0  •  omeprazole (PRILOSEC) 20 MG delayed-release capsule, Take 1 Capsule by mouth every day. Open capsule and mix in 10cc H2O, Disp: 30 Capsule, Rfl: 11  •  ondansetron (ZOFRAN ODT) 4 MG TABLET DISPERSIBLE, Dissolve 1 Tablet by mouth every 6 hours as needed for Nausea. (Patient not taking: Reported on 6/4/2022), Disp: 18 Tablet, Rfl: 0  •  Polyethylene Glycol 3350 (PEG 3350) 17 GM/SCOOP Powder, Mix and drink 17gm by mouth every day. While taking narcotics, hold if greater then 3  BMs a day (Patient not taking: Reported on 6/4/2022), Disp: 510 g, Rfl: 0  •  diazePAM (VALIUM) 5 MG Tab, , Disp: , Rfl:   •  cyclobenzaprine (FLEXERIL) 5 mg tablet, Take 1-2 Tablets by mouth 3 times a day as needed. (Patient not taking: Reported on 6/4/2022), Disp: 30 Tablet, Rfl: 0  •  SUMAtriptan (IMITREX) 25 MG Tab tablet, Take 1 Tablet by mouth one time as needed for Migraine for up to 1 dose., Disp: 10 Tablet, Rfl: 0  •  benzonatate (TESSALON) 100 MG Cap, Take 1 Capsule by mouth 3 times a day as needed., Disp: 30 Capsule, Rfl: 0  •  albuterol 108 (90 Base) MCG/ACT Aero Soln inhalation aerosol, Inhale 2 Puffs every four hours as needed for Shortness of Breath., Disp: 8.5 g, Rfl: 0  ALLERGIES:   Allergies   Allergen Reactions   • Nkda [No Known Drug Allergy]      SURGHX:   Past Surgical History:   Procedure Laterality Date   • [place] LAP, RAJI RESTRICT PROC, LONGITUDINAL GAS* N/A 5/24/2022    Procedure: GASTRECTOMY, SLEEVE, LAPAROSCOPIC;  Surgeon: Roderick Enriquez M.D.;  Location: SURGERY [facility];  Service: General   • OTHER  2017    wisdom teeth removed   • [place] LAP,CHOLECYSTECTOMY  2011   • CHOLECYSTECTOMY  2011   • CHOLECYSTECTOMY       SOCHX:  reports that she quit smoking about 13 years ago. She smoked 0.00 packs per day. She has never used smokeless tobacco. She reports previous alcohol use. She reports current drug use. Drug: Marijuana.  FH: Family history was reviewed, no pertinent findings to report    Review of Systems   Constitutional: Positive for chills. Negative for fever.   HENT: Positive for congestion, rhinorrhea and sore throat. Negative for ear pain.    Eyes: Negative for discharge and redness.   Respiratory: Negative for cough.    Gastrointestinal: Positive for nausea. Negative for diarrhea and vomiting.   Musculoskeletal: Positive for myalgias.   Neurological: Positive for headaches.              Objective     /82   Pulse 88   Temp 37 °C (98.6 °F) (Tympanic)   Resp 18   Ht 1.626 m (5'  "4\")   Wt 95.3 kg (210 lb)   SpO2 97%   BMI 36.05 kg/m²      Physical Exam  Constitutional:       General: She is not in acute distress.     Appearance: Normal appearance. She is not ill-appearing.   HENT:      Head: Normocephalic and atraumatic.      Right Ear: External ear normal.      Left Ear: External ear normal.      Nose: Nose normal.      Mouth/Throat:      Mouth: Mucous membranes are moist.      Pharynx: Oropharynx is clear. No posterior oropharyngeal erythema.   Eyes:      Extraocular Movements: Extraocular movements intact.      Conjunctiva/sclera: Conjunctivae normal.   Cardiovascular:      Rate and Rhythm: Normal rate and regular rhythm.      Heart sounds: Normal heart sounds.   Pulmonary:      Effort: Pulmonary effort is normal. No respiratory distress.      Breath sounds: Normal breath sounds. No wheezing.   Musculoskeletal:         General: Normal range of motion.      Cervical back: Normal range of motion and neck supple.   Skin:     General: Skin is warm and dry.   Neurological:      Mental Status: She is alert and oriented to person, place, and time.               Progress:  POCT Influenza: NEGATIVE      Decadron 10 mg IM given in clinic    The patient declined additional viral testing today in clinic.       Assessment & Plan      1. Flu-like symptoms  - POCT Influenza A/B    2. Body aches  - dexamethasone (DECADRON) injection (check route below) 10 mg    The patient's presenting symptoms and physical examination consistent with flulike symptoms with associated body aches.  The patient's physical exam today in clinic was normal.  The patient appears in no acute distress.  The patient's vital signs are stable and within normal limits.  She is afebrile today in clinic.  Discussed likely viral etiology with the patient.  The patient's POCT influenza testing today in clinic was negative.  The patient declined additional viral testing at this time.  The patient was given Decadron 10 mg IM for her " current symptoms.  Advised patient to monitor worsening signs and or symptoms.  Recommend OTC medications and supportive care for symptomatic management.  Recommend patient follow-up with her PCP as needed.  Discussed return precautions with patient, and she verbalized understanding.    Differential diagnoses, supportive care, and indications for immediate follow-up discussed with patient.   Instructed to return to clinic or nearest emergency department for any change in condition, further concerns, or worsening of symptoms.    OTC Tylenol or Motrin for fever/discomfort.  OTC cough/cold medication for symptomatic relief  OTC Supportive Care for Congestion - saline nasal spray or neti pot  OTC Supportive Care for Sore Throat - warm salt water gargles, sore throat lozenges, warm lemon water, and/or tea.  Drink plenty of fluids  Follow-up with PCP  Return to clinic or go to the ED if symptoms worsen or fail to improve, or if the patient should develop worsening/increasing cough, congestion, ear pain, sore throat, shortness of breath, wheezing, chest pain, fever/chills, and/or any concerning symptoms.    Discussed plan with the patient, and she agrees to the above.     I personally reviewed prior external notes and test results pertinent to today's visit.  I have independently reviewed and interpreted all diagnostics ordered during this urgent care visit.       Please note that this dictation was created using voice recognition software. I have made every reasonable attempt to correct obvious errors, but I expect that there may be errors of grammar and possibly content that I did not discover before finalizing the note.     This note was electronically signed by Nori Ruiz PA-C

## 2022-06-18 ENCOUNTER — HOSPITAL ENCOUNTER (OUTPATIENT)
Dept: LAB | Facility: MEDICAL CENTER | Age: 32
End: 2022-06-18
Attending: CLINICAL NURSE SPECIALIST
Payer: COMMERCIAL

## 2022-06-18 LAB
25(OH)D3 SERPL-MCNC: 18 NG/ML (ref 30–100)
ALBUMIN SERPL BCP-MCNC: 4.6 G/DL (ref 3.2–4.9)
ALBUMIN/GLOB SERPL: 1.4 G/DL
ALP SERPL-CCNC: 77 U/L (ref 30–99)
ALT SERPL-CCNC: 34 U/L (ref 2–50)
ANION GAP SERPL CALC-SCNC: 17 MMOL/L (ref 7–16)
AST SERPL-CCNC: 28 U/L (ref 12–45)
BASOPHILS # BLD AUTO: 0.7 % (ref 0–1.8)
BASOPHILS # BLD: 0.05 K/UL (ref 0–0.12)
BILIRUB SERPL-MCNC: 1 MG/DL (ref 0.1–1.5)
BUN SERPL-MCNC: 10 MG/DL (ref 8–22)
CALCIUM SERPL-MCNC: 9.9 MG/DL (ref 8.5–10.5)
CHLORIDE SERPL-SCNC: 103 MMOL/L (ref 96–112)
CHOLEST SERPL-MCNC: 162 MG/DL (ref 100–199)
CO2 SERPL-SCNC: 21 MMOL/L (ref 20–33)
CREAT SERPL-MCNC: 0.54 MG/DL (ref 0.5–1.4)
EOSINOPHIL # BLD AUTO: 0.04 K/UL (ref 0–0.51)
EOSINOPHIL NFR BLD: 0.6 % (ref 0–6.9)
ERYTHROCYTE [DISTWIDTH] IN BLOOD BY AUTOMATED COUNT: 42 FL (ref 35.9–50)
FERRITIN SERPL-MCNC: 218 NG/ML (ref 10–291)
FOLATE SERPL-MCNC: 13.7 NG/ML
GFR SERPLBLD CREATININE-BSD FMLA CKD-EPI: 125 ML/MIN/1.73 M 2
GLOBULIN SER CALC-MCNC: 3.2 G/DL (ref 1.9–3.5)
GLUCOSE SERPL-MCNC: 60 MG/DL (ref 65–99)
HCT VFR BLD AUTO: 46.1 % (ref 37–47)
HDLC SERPL-MCNC: 39 MG/DL
HGB BLD-MCNC: 16.3 G/DL (ref 12–16)
IMM GRANULOCYTES # BLD AUTO: 0.01 K/UL (ref 0–0.11)
IMM GRANULOCYTES NFR BLD AUTO: 0.1 % (ref 0–0.9)
IRON SATN MFR SERPL: 49 % (ref 15–55)
IRON SERPL-MCNC: 105 UG/DL (ref 40–170)
LDLC SERPL CALC-MCNC: 103 MG/DL
LYMPHOCYTES # BLD AUTO: 1.92 K/UL (ref 1–4.8)
LYMPHOCYTES NFR BLD: 26.8 % (ref 22–41)
MCH RBC QN AUTO: 32.5 PG (ref 27–33)
MCHC RBC AUTO-ENTMCNC: 35.4 G/DL (ref 33.6–35)
MCV RBC AUTO: 92 FL (ref 81.4–97.8)
MONOCYTES # BLD AUTO: 0.74 K/UL (ref 0–0.85)
MONOCYTES NFR BLD AUTO: 10.3 % (ref 0–13.4)
NEUTROPHILS # BLD AUTO: 4.4 K/UL (ref 2–7.15)
NEUTROPHILS NFR BLD: 61.5 % (ref 44–72)
NRBC # BLD AUTO: 0 K/UL
NRBC BLD-RTO: 0 /100 WBC
PLATELET # BLD AUTO: 248 K/UL (ref 164–446)
PMV BLD AUTO: 10.5 FL (ref 9–12.9)
POTASSIUM SERPL-SCNC: 4 MMOL/L (ref 3.6–5.5)
PROT SERPL-MCNC: 7.8 G/DL (ref 6–8.2)
RBC # BLD AUTO: 5.01 M/UL (ref 4.2–5.4)
SODIUM SERPL-SCNC: 141 MMOL/L (ref 135–145)
TIBC SERPL-MCNC: 215 UG/DL (ref 250–450)
TRIGL SERPL-MCNC: 98 MG/DL (ref 0–149)
UIBC SERPL-MCNC: 110 UG/DL (ref 110–370)
VIT B12 SERPL-MCNC: 1573 PG/ML (ref 211–911)
WBC # BLD AUTO: 7.2 K/UL (ref 4.8–10.8)

## 2022-06-18 PROCEDURE — 83540 ASSAY OF IRON: CPT

## 2022-06-18 PROCEDURE — 83550 IRON BINDING TEST: CPT

## 2022-06-18 PROCEDURE — 82607 VITAMIN B-12: CPT

## 2022-06-18 PROCEDURE — 82746 ASSAY OF FOLIC ACID SERUM: CPT

## 2022-06-18 PROCEDURE — 82728 ASSAY OF FERRITIN: CPT

## 2022-06-18 PROCEDURE — 85025 COMPLETE CBC W/AUTO DIFF WBC: CPT

## 2022-06-18 PROCEDURE — 80053 COMPREHEN METABOLIC PANEL: CPT

## 2022-06-18 PROCEDURE — 84425 ASSAY OF VITAMIN B-1: CPT

## 2022-06-18 PROCEDURE — 80061 LIPID PANEL: CPT

## 2022-06-18 PROCEDURE — 82306 VITAMIN D 25 HYDROXY: CPT

## 2022-06-18 PROCEDURE — 36415 COLL VENOUS BLD VENIPUNCTURE: CPT

## 2022-06-22 LAB — VIT B1 BLD-MCNC: 50 NMOL/L (ref 70–180)

## 2022-06-28 ENCOUNTER — APPOINTMENT (OUTPATIENT)
Dept: MEDICAL GROUP | Facility: MEDICAL CENTER | Age: 32
End: 2022-06-28
Payer: COMMERCIAL

## 2022-07-05 ENCOUNTER — OFFICE VISIT (OUTPATIENT)
Dept: MEDICAL GROUP | Facility: MEDICAL CENTER | Age: 32
End: 2022-07-05
Payer: COMMERCIAL

## 2022-07-05 VITALS
OXYGEN SATURATION: 97 % | WEIGHT: 192 LBS | TEMPERATURE: 97.2 F | HEART RATE: 72 BPM | RESPIRATION RATE: 16 BRPM | HEIGHT: 64 IN | SYSTOLIC BLOOD PRESSURE: 116 MMHG | DIASTOLIC BLOOD PRESSURE: 72 MMHG | BODY MASS INDEX: 32.78 KG/M2

## 2022-07-05 DIAGNOSIS — Z01.818 PREOPERATIVE EVALUATION TO RULE OUT SURGICAL CONTRAINDICATION: ICD-10-CM

## 2022-07-05 DIAGNOSIS — M54.9 ACUTE BILATERAL BACK PAIN, UNSPECIFIED BACK LOCATION: ICD-10-CM

## 2022-07-05 PROCEDURE — 93000 ELECTROCARDIOGRAM COMPLETE: CPT | Performed by: STUDENT IN AN ORGANIZED HEALTH CARE EDUCATION/TRAINING PROGRAM

## 2022-07-05 PROCEDURE — 99213 OFFICE O/P EST LOW 20 MIN: CPT | Performed by: STUDENT IN AN ORGANIZED HEALTH CARE EDUCATION/TRAINING PROGRAM

## 2022-07-05 ASSESSMENT — FIBROSIS 4 INDEX: FIB4 SCORE: 0.62

## 2022-07-05 NOTE — LETTER
July 5, 2022    To Whom It May Concern:         This is confirmation that Dee Cornejo attended her scheduled appointment with Candice Segura P.A.-C. on 7/05/22.  Patient with normal GFR, EKG and other labs.  Patient with no previous medical diagnoses that would prevent her from receiving anesthesia.         If you have any questions please do not hesitate to call me at the phone number listed below.    Sincerely,      Candice Segura P.A.-C.  [phone]

## 2022-08-01 ENCOUNTER — ANESTHESIA EVENT (OUTPATIENT)
Dept: RADIOLOGY | Facility: MEDICAL CENTER | Age: 32
End: 2022-08-01
Payer: COMMERCIAL

## 2022-08-01 ENCOUNTER — ANESTHESIA (OUTPATIENT)
Dept: RADIOLOGY | Facility: MEDICAL CENTER | Age: 32
End: 2022-08-01
Payer: COMMERCIAL

## 2022-08-01 ENCOUNTER — HOSPITAL ENCOUNTER (OUTPATIENT)
Dept: RADIOLOGY | Facility: MEDICAL CENTER | Age: 32
End: 2022-08-01
Attending: STUDENT IN AN ORGANIZED HEALTH CARE EDUCATION/TRAINING PROGRAM
Payer: COMMERCIAL

## 2022-08-01 ENCOUNTER — HOSPITAL ENCOUNTER (OUTPATIENT)
Facility: MEDICAL CENTER | Age: 32
End: 2022-08-01
Attending: ANESTHESIOLOGY
Payer: COMMERCIAL

## 2022-08-01 VITALS
BODY MASS INDEX: 30.56 KG/M2 | WEIGHT: 179.01 LBS | RESPIRATION RATE: 20 BRPM | OXYGEN SATURATION: 100 % | HEART RATE: 84 BPM | HEIGHT: 64 IN | TEMPERATURE: 97.3 F | DIASTOLIC BLOOD PRESSURE: 62 MMHG | SYSTOLIC BLOOD PRESSURE: 105 MMHG

## 2022-08-01 DIAGNOSIS — M54.2 NECK PAIN: ICD-10-CM

## 2022-08-01 DIAGNOSIS — M54.50 LUMBAR PAIN: ICD-10-CM

## 2022-08-01 DIAGNOSIS — M54.9 MID BACK PAIN: ICD-10-CM

## 2022-08-01 LAB — HCG UR QL: NEGATIVE

## 2022-08-01 PROCEDURE — 700101 HCHG RX REV CODE 250: Performed by: ANESTHESIOLOGY

## 2022-08-01 PROCEDURE — 72148 MRI LUMBAR SPINE W/O DYE: CPT

## 2022-08-01 PROCEDURE — 01922 ANES N-INVAS IMG/RADJ THER: CPT | Performed by: ANESTHESIOLOGY

## 2022-08-01 PROCEDURE — 72146 MRI CHEST SPINE W/O DYE: CPT

## 2022-08-01 PROCEDURE — 72141 MRI NECK SPINE W/O DYE: CPT

## 2022-08-01 PROCEDURE — 700105 HCHG RX REV CODE 258: Performed by: ANESTHESIOLOGY

## 2022-08-01 PROCEDURE — 700111 HCHG RX REV CODE 636 W/ 250 OVERRIDE (IP): Performed by: ANESTHESIOLOGY

## 2022-08-01 PROCEDURE — 81025 URINE PREGNANCY TEST: CPT

## 2022-08-01 RX ORDER — ALBUTEROL SULFATE 2.5 MG/3ML
2.5 SOLUTION RESPIRATORY (INHALATION)
Status: DISCONTINUED | OUTPATIENT
Start: 2022-08-01 | End: 2022-08-02 | Stop reason: HOSPADM

## 2022-08-01 RX ORDER — LIDOCAINE HYDROCHLORIDE 20 MG/ML
INJECTION, SOLUTION EPIDURAL; INFILTRATION; INTRACAUDAL; PERINEURAL PRN
Status: DISCONTINUED | OUTPATIENT
Start: 2022-08-01 | End: 2022-08-01 | Stop reason: SURG

## 2022-08-01 RX ORDER — LABETALOL HYDROCHLORIDE 5 MG/ML
5 INJECTION, SOLUTION INTRAVENOUS
Status: DISCONTINUED | OUTPATIENT
Start: 2022-08-01 | End: 2022-08-02 | Stop reason: HOSPADM

## 2022-08-01 RX ORDER — PYRIDOXINE HCL (VITAMIN B6) 25 MG
LOZENGE ON A HANDLE MUCOUS MEMBRANE
COMMUNITY
Start: 2022-07-20 | End: 2022-09-01

## 2022-08-01 RX ORDER — METOPROLOL TARTRATE 1 MG/ML
1 INJECTION, SOLUTION INTRAVENOUS
Status: DISCONTINUED | OUTPATIENT
Start: 2022-08-01 | End: 2022-08-02 | Stop reason: HOSPADM

## 2022-08-01 RX ORDER — SODIUM CHLORIDE, SODIUM LACTATE, POTASSIUM CHLORIDE, CALCIUM CHLORIDE 600; 310; 30; 20 MG/100ML; MG/100ML; MG/100ML; MG/100ML
INJECTION, SOLUTION INTRAVENOUS CONTINUOUS
Status: DISCONTINUED | OUTPATIENT
Start: 2022-08-01 | End: 2022-08-02 | Stop reason: HOSPADM

## 2022-08-01 RX ORDER — SODIUM CHLORIDE, SODIUM LACTATE, POTASSIUM CHLORIDE, CALCIUM CHLORIDE 600; 310; 30; 20 MG/100ML; MG/100ML; MG/100ML; MG/100ML
INJECTION, SOLUTION INTRAVENOUS
Status: DISCONTINUED | OUTPATIENT
Start: 2022-08-01 | End: 2022-08-01 | Stop reason: SURG

## 2022-08-01 RX ORDER — M-VIT,TX,IRON,MINS/CALC/FOLIC 27MG-0.4MG
1 TABLET ORAL DAILY
COMMUNITY

## 2022-08-01 RX ORDER — ONDANSETRON 2 MG/ML
4 INJECTION INTRAMUSCULAR; INTRAVENOUS
Status: DISCONTINUED | OUTPATIENT
Start: 2022-08-01 | End: 2022-08-02 | Stop reason: HOSPADM

## 2022-08-01 RX ORDER — SODIUM CHLORIDE 9 MG/ML
500 INJECTION, SOLUTION INTRAVENOUS
Status: DISCONTINUED | OUTPATIENT
Start: 2022-08-01 | End: 2022-08-02 | Stop reason: HOSPADM

## 2022-08-01 RX ORDER — LIDOCAINE HYDROCHLORIDE 20 MG/ML
INJECTION, SOLUTION INFILTRATION; PERINEURAL
Status: COMPLETED
Start: 2022-08-01 | End: 2022-08-01

## 2022-08-01 RX ORDER — HYDRALAZINE HYDROCHLORIDE 20 MG/ML
5 INJECTION INTRAMUSCULAR; INTRAVENOUS
Status: DISCONTINUED | OUTPATIENT
Start: 2022-08-01 | End: 2022-08-02 | Stop reason: HOSPADM

## 2022-08-01 RX ADMIN — LIDOCAINE HYDROCHLORIDE 50 MG: 20 INJECTION, SOLUTION EPIDURAL; INFILTRATION; INTRACAUDAL at 15:45

## 2022-08-01 RX ADMIN — PROPOFOL 100 MG: 10 INJECTION, EMULSION INTRAVENOUS at 15:45

## 2022-08-01 RX ADMIN — SODIUM CHLORIDE, POTASSIUM CHLORIDE, SODIUM LACTATE AND CALCIUM CHLORIDE: 600; 310; 30; 20 INJECTION, SOLUTION INTRAVENOUS at 15:40

## 2022-08-01 ASSESSMENT — FIBROSIS 4 INDEX: FIB4 SCORE: 0.62

## 2022-08-01 NOTE — ANESTHESIA TIME REPORT
Anesthesia Start and Stop Event Times     Date Time Event    8/1/2022 1539 Ready for Procedure     1540 Anesthesia Start     1634 Anesthesia Stop        Responsible Staff  08/01/22    Name Role Begin End    Neida Pardo M.D. Anesth 1540 1634        Overtime Reason:  no overtime (within assigned shift)    Comments:

## 2022-08-01 NOTE — ANESTHESIA PREPROCEDURE EVALUATION
Date/Time: 08/01/22 1345    Procedure: MR-CERVICAL SPINE-W/O    Diagnosis: Neck pain [M54.2]    Location: [facility] - MRI - [address]          Relevant Problems   No relevant active problems       Physical Exam    Airway   Mallampati: II  TM distance: >3 FB  Neck ROM: full       Cardiovascular - normal exam  Rhythm: regular  Rate: normal  (-) murmur     Dental - normal exam           Pulmonary - normal exam  Breath sounds clear to auscultation     Abdominal    Neurological - normal exam                 Anesthesia Plan    ASA 2       Plan - MAC             Plan Factors:   Patient was previously instructed to abstain from smoking on day of procedure.  Patient did not smoke on day of procedure.      Induction: intravenous    Postoperative Plan: Postoperative administration of opioids is intended.    Pertinent diagnostic labs and testing reviewed    Informed Consent:    Anesthetic plan and risks discussed with patient.

## 2022-08-01 NOTE — ANESTHESIA POSTPROCEDURE EVALUATION
Patient: Dee Cornejo    Procedure Summary     Date: 08/01/22 Room / Location: [facility] - MRI - [address]    Anesthesia Start: 1540 Anesthesia Stop: 1634    Procedure: MR-CERVICAL SPINE-W/O Diagnosis: Neck pain    Scheduled Providers:  Responsible Provider: Neida Pardo M.D.    Anesthesia Type: MAC ASA Status: 2          Final Anesthesia Type: MAC  Last vitals  BP   Blood Pressure: 109/63    Temp   36.7 °C (98 °F)    Pulse   75   Resp   18    SpO2   99 %      Anesthesia Post Evaluation    Patient location during evaluation: PACU  Patient participation: complete - patient participated  Level of consciousness: awake and alert    Airway patency: patent  Anesthetic complications: no  Cardiovascular status: hemodynamically stable  Respiratory status: acceptable  Hydration status: euvolemic    PONV: none          No complications documented.     Nurse Pain Score: 0 (NPRS)

## 2022-08-01 NOTE — DISCHARGE INSTRUCTIONS
MRI ADULT DISCHARGE INSTRUCTIONS    You have been medicated today for your scan. Please follow the instructions below to ensure your safe recovery. If you have any questions or problems, feel free to call us at [phone] or [phone].     1.   Have someone stay with you to assist you as needed.    2.   Do not drive or operate any mechanical devices.    3.   Do not perform any activity that requires concentration. Make no major decisions over the next 24 hours.     4.   Be careful changing positions from laying down to sitting or standing, as you may become dizzy.     5.   Do not drink alcohol for 48 hours.    6.   There are no restrictions for eating your normal meals. Drink fluids.    7.   You may continue your usual medications for pain, tranquilizers, muscle relaxants or sedatives when awake.     8.   Tomorrow, you may continue your normal daily activities.     9.   Pressure dressing on 10 - 15 minutes. If swelling or bleeding occurs when removed, continue placing direct pressure on injection site for another 5 minutes, or until bleeding stops.     I have been informed of and understand the above discharge instructions.

## 2022-09-01 ENCOUNTER — APPOINTMENT (OUTPATIENT)
Dept: RADIOLOGY | Facility: MEDICAL CENTER | Age: 32
End: 2022-09-01
Attending: EMERGENCY MEDICINE
Payer: COMMERCIAL

## 2022-09-01 ENCOUNTER — HOSPITAL ENCOUNTER (OUTPATIENT)
Facility: MEDICAL CENTER | Age: 32
End: 2022-09-02
Attending: EMERGENCY MEDICINE | Admitting: SURGERY
Payer: COMMERCIAL

## 2022-09-01 PROBLEM — T18.128A FOOD IMPACTION OF ESOPHAGUS, INITIAL ENCOUNTER: Status: ACTIVE | Noted: 2022-09-01

## 2022-09-01 PROBLEM — R11.2 NAUSEA & VOMITING: Status: ACTIVE | Noted: 2022-09-01
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
## 2022-09-01 LAB
ALBUMIN SERPL BCP-MCNC: 4.2 G/DL (ref 3.2–4.9)
ALBUMIN/GLOB SERPL: 1.5 G/DL
ALP SERPL-CCNC: 58 U/L (ref 30–99)
ALT SERPL-CCNC: 12 U/L (ref 2–50)
ANION GAP SERPL CALC-SCNC: 10 MMOL/L (ref 7–16)
AST SERPL-CCNC: 14 U/L (ref 12–45)
BASOPHILS # BLD AUTO: 0.4 % (ref 0–1.8)
BASOPHILS # BLD: 0.03 K/UL (ref 0–0.12)
BILIRUB SERPL-MCNC: 0.5 MG/DL (ref 0.1–1.5)
BUN SERPL-MCNC: 12 MG/DL (ref 8–22)
CALCIUM SERPL-MCNC: 9.7 MG/DL (ref 8.5–10.5)
CHLORIDE SERPL-SCNC: 105 MMOL/L (ref 96–112)
CO2 SERPL-SCNC: 23 MMOL/L (ref 20–33)
CREAT SERPL-MCNC: 0.4 MG/DL (ref 0.5–1.4)
EOSINOPHIL # BLD AUTO: 0 K/UL (ref 0–0.51)
EOSINOPHIL NFR BLD: 0 % (ref 0–6.9)
ERYTHROCYTE [DISTWIDTH] IN BLOOD BY AUTOMATED COUNT: 47 FL (ref 35.9–50)
GFR SERPLBLD CREATININE-BSD FMLA CKD-EPI: 134 ML/MIN/1.73 M 2
GLOBULIN SER CALC-MCNC: 2.8 G/DL (ref 1.9–3.5)
GLUCOSE SERPL-MCNC: 92 MG/DL (ref 65–99)
HCG SERPL QL: NEGATIVE
HCT VFR BLD AUTO: 38.6 % (ref 37–47)
HGB BLD-MCNC: 13.5 G/DL (ref 12–16)
IMM GRANULOCYTES # BLD AUTO: 0.03 K/UL (ref 0–0.11)
IMM GRANULOCYTES NFR BLD AUTO: 0.4 % (ref 0–0.9)
LIPASE SERPL-CCNC: 18 U/L (ref 11–82)
LYMPHOCYTES # BLD AUTO: 1.7 K/UL (ref 1–4.8)
LYMPHOCYTES NFR BLD: 20.6 % (ref 22–41)
MCH RBC QN AUTO: 33.3 PG (ref 27–33)
MCHC RBC AUTO-ENTMCNC: 35 G/DL (ref 33.6–35)
MCV RBC AUTO: 95.1 FL (ref 81.4–97.8)
MONOCYTES # BLD AUTO: 0.64 K/UL (ref 0–0.85)
MONOCYTES NFR BLD AUTO: 7.7 % (ref 0–13.4)
NEUTROPHILS # BLD AUTO: 5.86 K/UL (ref 2–7.15)
NEUTROPHILS NFR BLD: 70.9 % (ref 44–72)
NRBC # BLD AUTO: 0 K/UL
NRBC BLD-RTO: 0 /100 WBC
PLATELET # BLD AUTO: 270 K/UL (ref 164–446)
PMV BLD AUTO: 10 FL (ref 9–12.9)
POTASSIUM SERPL-SCNC: 3.9 MMOL/L (ref 3.6–5.5)
PROT SERPL-MCNC: 7 G/DL (ref 6–8.2)
RBC # BLD AUTO: 4.06 M/UL (ref 4.2–5.4)
SODIUM SERPL-SCNC: 138 MMOL/L (ref 135–145)
WBC # BLD AUTO: 8.3 K/UL (ref 4.8–10.8)

## 2022-09-01 PROCEDURE — 700117 HCHG RX CONTRAST REV CODE 255: Performed by: EMERGENCY MEDICINE

## 2022-09-01 PROCEDURE — G0378 HOSPITAL OBSERVATION PER HR: HCPCS

## 2022-09-01 PROCEDURE — 700105 HCHG RX REV CODE 258: Performed by: EMERGENCY MEDICINE

## 2022-09-01 PROCEDURE — 84703 CHORIONIC GONADOTROPIN ASSAY: CPT

## 2022-09-01 PROCEDURE — 700101 HCHG RX REV CODE 250: Performed by: SURGERY

## 2022-09-01 PROCEDURE — 85025 COMPLETE CBC W/AUTO DIFF WBC: CPT

## 2022-09-01 PROCEDURE — 83690 ASSAY OF LIPASE: CPT

## 2022-09-01 PROCEDURE — 99285 EMERGENCY DEPT VISIT HI MDM: CPT

## 2022-09-01 PROCEDURE — 700102 HCHG RX REV CODE 250 W/ 637 OVERRIDE(OP): Performed by: EMERGENCY MEDICINE

## 2022-09-01 PROCEDURE — 96375 TX/PRO/DX INJ NEW DRUG ADDON: CPT

## 2022-09-01 PROCEDURE — 80053 COMPREHEN METABOLIC PANEL: CPT

## 2022-09-01 PROCEDURE — 74177 CT ABD & PELVIS W/CONTRAST: CPT

## 2022-09-01 PROCEDURE — A9270 NON-COVERED ITEM OR SERVICE: HCPCS | Performed by: EMERGENCY MEDICINE

## 2022-09-01 PROCEDURE — 700111 HCHG RX REV CODE 636 W/ 250 OVERRIDE (IP): Performed by: EMERGENCY MEDICINE

## 2022-09-01 PROCEDURE — 36415 COLL VENOUS BLD VENIPUNCTURE: CPT

## 2022-09-01 PROCEDURE — 96374 THER/PROPH/DIAG INJ IV PUSH: CPT

## 2022-09-01 RX ORDER — OXYCODONE HYDROCHLORIDE 10 MG/1
10 TABLET ORAL
Status: DISCONTINUED | OUTPATIENT
Start: 2022-09-01 | End: 2022-09-02 | Stop reason: HOSPADM

## 2022-09-01 RX ORDER — DIPHENHYDRAMINE HYDROCHLORIDE 50 MG/ML
25 INJECTION INTRAMUSCULAR; INTRAVENOUS EVERY 6 HOURS PRN
Status: DISCONTINUED | OUTPATIENT
Start: 2022-09-01 | End: 2022-09-02 | Stop reason: HOSPADM

## 2022-09-01 RX ORDER — HYDROMORPHONE HYDROCHLORIDE 1 MG/ML
0.5 INJECTION, SOLUTION INTRAMUSCULAR; INTRAVENOUS; SUBCUTANEOUS EVERY 4 HOURS PRN
Status: DISCONTINUED | OUTPATIENT
Start: 2022-09-01 | End: 2022-09-01

## 2022-09-01 RX ORDER — DIPHENHYDRAMINE HYDROCHLORIDE 50 MG/ML
25 INJECTION INTRAMUSCULAR; INTRAVENOUS ONCE
Status: COMPLETED | OUTPATIENT
Start: 2022-09-01 | End: 2022-09-01

## 2022-09-01 RX ORDER — DEXTROSE MONOHYDRATE, SODIUM CHLORIDE, AND POTASSIUM CHLORIDE 50; 2.98; 4.5 G/1000ML; G/1000ML; G/1000ML
INJECTION, SOLUTION INTRAVENOUS CONTINUOUS
Status: DISCONTINUED | OUTPATIENT
Start: 2022-09-01 | End: 2022-09-01

## 2022-09-01 RX ORDER — OXYCODONE HYDROCHLORIDE 5 MG/1
5 TABLET ORAL
Status: DISCONTINUED | OUTPATIENT
Start: 2022-09-01 | End: 2022-09-02 | Stop reason: HOSPADM

## 2022-09-01 RX ORDER — METOCLOPRAMIDE HYDROCHLORIDE 5 MG/ML
10 INJECTION INTRAMUSCULAR; INTRAVENOUS ONCE
Status: COMPLETED | OUTPATIENT
Start: 2022-09-01 | End: 2022-09-01

## 2022-09-01 RX ORDER — ONDANSETRON 2 MG/ML
4 INJECTION INTRAMUSCULAR; INTRAVENOUS EVERY 4 HOURS PRN
Status: DISCONTINUED | OUTPATIENT
Start: 2022-09-01 | End: 2022-09-02 | Stop reason: HOSPADM

## 2022-09-01 RX ORDER — SODIUM CHLORIDE, SODIUM LACTATE, POTASSIUM CHLORIDE, CALCIUM CHLORIDE 600; 310; 30; 20 MG/100ML; MG/100ML; MG/100ML; MG/100ML
1000 INJECTION, SOLUTION INTRAVENOUS ONCE
Status: COMPLETED | OUTPATIENT
Start: 2022-09-01 | End: 2022-09-01

## 2022-09-01 RX ORDER — HALOPERIDOL 5 MG/ML
1 INJECTION INTRAMUSCULAR EVERY 6 HOURS PRN
Status: DISCONTINUED | OUTPATIENT
Start: 2022-09-01 | End: 2022-09-02 | Stop reason: HOSPADM

## 2022-09-01 RX ORDER — DEXAMETHASONE SODIUM PHOSPHATE 4 MG/ML
4 INJECTION, SOLUTION INTRA-ARTICULAR; INTRALESIONAL; INTRAMUSCULAR; INTRAVENOUS; SOFT TISSUE
Status: DISCONTINUED | OUTPATIENT
Start: 2022-09-01 | End: 2022-09-02 | Stop reason: HOSPADM

## 2022-09-01 RX ORDER — DIPHENHYDRAMINE HCL 25 MG
25 TABLET ORAL EVERY 6 HOURS PRN
Status: DISCONTINUED | OUTPATIENT
Start: 2022-09-01 | End: 2022-09-02 | Stop reason: HOSPADM

## 2022-09-01 RX ORDER — ONDANSETRON 2 MG/ML
4 INJECTION INTRAMUSCULAR; INTRAVENOUS EVERY 6 HOURS PRN
Status: DISCONTINUED | OUTPATIENT
Start: 2022-09-01 | End: 2022-09-01

## 2022-09-01 RX ORDER — ONDANSETRON 2 MG/ML
4 INJECTION INTRAMUSCULAR; INTRAVENOUS ONCE
Status: COMPLETED | OUTPATIENT
Start: 2022-09-01 | End: 2022-09-01

## 2022-09-01 RX ORDER — CALCIUM CARBONATE 500 MG/1
500 TABLET, CHEWABLE ORAL
Status: DISCONTINUED | OUTPATIENT
Start: 2022-09-01 | End: 2022-09-02 | Stop reason: HOSPADM

## 2022-09-01 RX ORDER — SODIUM CHLORIDE, SODIUM LACTATE, POTASSIUM CHLORIDE, AND CALCIUM CHLORIDE .6; .31; .03; .02 G/100ML; G/100ML; G/100ML; G/100ML
500 INJECTION, SOLUTION INTRAVENOUS
Status: DISCONTINUED | OUTPATIENT
Start: 2022-09-01 | End: 2022-09-02 | Stop reason: HOSPADM

## 2022-09-01 RX ORDER — DEXTROSE MONOHYDRATE, SODIUM CHLORIDE, AND POTASSIUM CHLORIDE 50; 1.49; 4.5 G/1000ML; G/1000ML; G/1000ML
INJECTION, SOLUTION INTRAVENOUS CONTINUOUS
Status: DISCONTINUED | OUTPATIENT
Start: 2022-09-01 | End: 2022-09-02 | Stop reason: HOSPADM

## 2022-09-01 RX ORDER — ENOXAPARIN SODIUM 100 MG/ML
40 INJECTION SUBCUTANEOUS DAILY
Status: DISCONTINUED | OUTPATIENT
Start: 2022-09-02 | End: 2022-09-02 | Stop reason: HOSPADM

## 2022-09-01 RX ORDER — SCOLOPAMINE TRANSDERMAL SYSTEM 1 MG/1
1 PATCH, EXTENDED RELEASE TRANSDERMAL
Status: DISCONTINUED | OUTPATIENT
Start: 2022-09-01 | End: 2022-09-02 | Stop reason: HOSPADM

## 2022-09-01 RX ORDER — HYDROMORPHONE HYDROCHLORIDE 1 MG/ML
0.5 INJECTION, SOLUTION INTRAMUSCULAR; INTRAVENOUS; SUBCUTANEOUS
Status: DISCONTINUED | OUTPATIENT
Start: 2022-09-01 | End: 2022-09-02 | Stop reason: HOSPADM

## 2022-09-01 RX ADMIN — ONDANSETRON 4 MG: 2 INJECTION INTRAMUSCULAR; INTRAVENOUS at 16:11

## 2022-09-01 RX ADMIN — SODIUM CHLORIDE, POTASSIUM CHLORIDE, SODIUM LACTATE AND CALCIUM CHLORIDE 1000 ML: 600; 310; 30; 20 INJECTION, SOLUTION INTRAVENOUS at 16:12

## 2022-09-01 RX ADMIN — LIDOCAINE HYDROCHLORIDE 30 ML: 20 SOLUTION OROPHARYNGEAL at 18:09

## 2022-09-01 RX ADMIN — DIPHENHYDRAMINE HYDROCHLORIDE 25 MG: 50 INJECTION INTRAMUSCULAR; INTRAVENOUS at 18:10

## 2022-09-01 RX ADMIN — IOHEXOL 100 ML: 350 INJECTION, SOLUTION INTRAVENOUS at 17:13

## 2022-09-01 RX ADMIN — METOCLOPRAMIDE 10 MG: 5 INJECTION, SOLUTION INTRAMUSCULAR; INTRAVENOUS at 18:09

## 2022-09-01 RX ADMIN — DEXTROSE, SODIUM CHLORIDE, AND POTASSIUM CHLORIDE: 5; .45; .15 INJECTION INTRAVENOUS at 20:45

## 2022-09-01 ASSESSMENT — PATIENT HEALTH QUESTIONNAIRE - PHQ9
2. FEELING DOWN, DEPRESSED, IRRITABLE, OR HOPELESS: NOT AT ALL
SUM OF ALL RESPONSES TO PHQ9 QUESTIONS 1 AND 2: 0
1. LITTLE INTEREST OR PLEASURE IN DOING THINGS: NOT AT ALL

## 2022-09-01 ASSESSMENT — LIFESTYLE VARIABLES
AVERAGE NUMBER OF DAYS PER WEEK YOU HAVE A DRINK CONTAINING ALCOHOL: 0
HAVE PEOPLE ANNOYED YOU BY CRITICIZING YOUR DRINKING: NO
EVER FELT BAD OR GUILTY ABOUT YOUR DRINKING: NO
TOTAL SCORE: 0
TOTAL SCORE: 0
HAVE YOU EVER FELT YOU SHOULD CUT DOWN ON YOUR DRINKING: NO
ALCOHOL_USE: NO
TOTAL SCORE: 0
ON A TYPICAL DAY WHEN YOU DRINK ALCOHOL HOW MANY DRINKS DO YOU HAVE: 0
CONSUMPTION TOTAL: NEGATIVE
HOW MANY TIMES IN THE PAST YEAR HAVE YOU HAD 5 OR MORE DRINKS IN A DAY: 0
EVER HAD A DRINK FIRST THING IN THE MORNING TO STEADY YOUR NERVES TO GET RID OF A HANGOVER: NO

## 2022-09-01 ASSESSMENT — FIBROSIS 4 INDEX: FIB4 SCORE: 0.64
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
## 2022-09-01 NOTE — ED TRIAGE NOTES
"Chief Complaint   Patient presents with    Nausea     Pt had gastric sleeve surgery in May by Dr. Enriquez. Pt was eating an hour ago and immediately started experiencing severe nausea, white frothy sputum, and upper chest pain. Pt stated she texted her surgeon and he told her to immediatly come to there ER in case of a blockage.      Pt ambulatory to triage with above complaint. Pt states she has had GERD since the surgery, but the severe nausea/frothy sputum has never happened. Pt describes the pain as \"it feels like something is stuck.\"     Protocol ordered. Pt educated on triage process, placed back in lobby, and instructed to inform staff of any changes.     /72   Pulse 77   Temp 37.1 °C (98.7 °F) (Temporal)   Resp 16   Ht 1.626 m (5' 4\")   Wt 78.3 kg (172 lb 9.9 oz)   SpO2 98%   BMI 29.63 kg/m²     "

## 2022-09-02 VITALS
BODY MASS INDEX: 29.47 KG/M2 | WEIGHT: 172.62 LBS | TEMPERATURE: 97 F | OXYGEN SATURATION: 93 % | RESPIRATION RATE: 18 BRPM | HEIGHT: 64 IN | SYSTOLIC BLOOD PRESSURE: 92 MMHG | DIASTOLIC BLOOD PRESSURE: 59 MMHG | HEART RATE: 56 BPM

## 2022-09-02 PROCEDURE — A9270 NON-COVERED ITEM OR SERVICE: HCPCS | Performed by: NURSE PRACTITIONER

## 2022-09-02 PROCEDURE — 700102 HCHG RX REV CODE 250 W/ 637 OVERRIDE(OP): Performed by: NURSE PRACTITIONER

## 2022-09-02 PROCEDURE — G0378 HOSPITAL OBSERVATION PER HR: HCPCS

## 2022-09-02 RX ORDER — ALUMINA, MAGNESIA, AND SIMETHICONE 2400; 2400; 240 MG/30ML; MG/30ML; MG/30ML
10 SUSPENSION ORAL 4 TIMES DAILY PRN
Status: DISCONTINUED | OUTPATIENT
Start: 2022-09-02 | End: 2022-09-02 | Stop reason: HOSPADM

## 2022-09-02 RX ORDER — PANTOPRAZOLE SODIUM 40 MG/10ML
40 INJECTION, POWDER, LYOPHILIZED, FOR SOLUTION INTRAVENOUS DAILY
Status: DISCONTINUED | OUTPATIENT
Start: 2022-09-02 | End: 2022-09-02

## 2022-09-02 RX ORDER — LANSOPRAZOLE 30 MG/1
30 CAPSULE, DELAYED RELEASE ORAL
Status: DISCONTINUED | OUTPATIENT
Start: 2022-09-02 | End: 2022-09-02

## 2022-09-02 RX ADMIN — ALUMINUM HYDROXIDE, MAGNESIUM HYDROXIDE, AND DIMETHICONE 10 ML: 400; 400; 40 SUSPENSION ORAL at 12:55

## 2022-09-02 ASSESSMENT — ENCOUNTER SYMPTOMS
DIZZINESS: 0
VOMITING: 0
MEMORY LOSS: 0
CHILLS: 0
ABDOMINAL PAIN: 0
HEADACHES: 0
SPUTUM PRODUCTION: 0
FEVER: 0
TINGLING: 0
NAUSEA: 0
COUGH: 0
WEIGHT LOSS: 1
SHORTNESS OF BREATH: 0
HEMOPTYSIS: 0
FALLS: 0
PALPITATIONS: 0

## 2022-09-02 ASSESSMENT — PAIN DESCRIPTION - PAIN TYPE: TYPE: ACUTE PAIN

## 2022-09-02 ASSESSMENT — LIFESTYLE VARIABLES: SUBSTANCE_ABUSE: 0

## 2022-09-02 NOTE — PROGRESS NOTES
4 Eyes Skin Assessment Completed by YOLI Lawrence and YOLI Yi.    Head WDL  Ears WDL  Nose WDL  Mouth WDL  Neck WDL  Breast/Chest WDL  Shoulder Blades WDL  Spine WDL  (R) Arm/Elbow/Hand WDL  (L) Arm/Elbow/Hand WDL  Abdomen WDL  Groin WDL  Scrotum/Coccyx/Buttocks WDL  (R) Leg WDL  (L) Leg WDL  (R) Heel/Foot/Toe WDL  (L) Heel/Foot/Toe WDL          Devices In Places Blood Pressure Cuff and Pulse Ox      Interventions In Place Pillows    Possible Skin Injury No    Pictures Uploaded Into Epic No, needs to be completed  Wound Consult Placed N/A  RN Wound Prevention Protocol Ordered No

## 2022-09-02 NOTE — ED NOTES
Med Rec complete per Patient  Allergies reviewed    Patient reports she was on Fluconazole at beginning of August

## 2022-09-02 NOTE — PROGRESS NOTES
"       S: Dee Cornejo  is a 32 y.o.  female admitted for food impaction, which has resolved.      O:  BP (!) 92/59   Pulse (!) 56   Temp 36.1 °C (97 °F) (Temporal)   Resp 18   Ht 1.626 m (5' 4\")   Wt 78.3 kg (172 lb 9.9 oz)   SpO2 93%   Intake/Output       None          Recent Labs     09/01/22  1505   SODIUM 138   POTASSIUM 3.9   CHLORIDE 105   CO2 23   GLUCOSE 92   BUN 12   CREATININE 0.40*   CALCIUM 9.7     Recent Labs     09/01/22  1505   WBC 8.3   RBC 4.06*   HEMOGLOBIN 13.5   HEMATOCRIT 38.6   MCV 95.1   MCH 33.3*   MCHC 35.0   RDW 47.0   PLATELETCT 270   MPV 10.0       Alert and Oriented x3, No Acute Distress  Normal Respiratory Effort  Abdomen soft, appropriately tender  Extremities warm and well perfused    A/P:  Plan for diet advance then d/c home once tolerated.     Roderick Enriquez MD  [facility]    "
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
## 2022-09-02 NOTE — ED NOTES
Report called to Mckay Lawrence for T201. No needs at this time and pt and belongings packed and ready for floor

## 2022-09-02 NOTE — DISCHARGE INSTRUCTIONS
D/C instructions:    DIET: Upon discharge from the hospital you may resume your normal preoperative diet. Depending on how you are feeling and whether you have nausea or not, you may wish to stay with a bland diet for the first few days. However, you can advance this as quickly as you feel ready.    ACTIVITIES: After discharge from the hospital, you may resume full routine activities. However, there should be no heavy lifting (greater than 15 pounds) and no strenuous activities until after your follow-up visit. Otherwise, routine activities of daily living are acceptable.    DRIVING: If you drive, you may drive whenever you are off pain medications and are able to perform the activities needed to drive, i.e. turning, bending, twisting, etc.    BATHING: You may get the wound wet at any time after leaving the hospital. You may shower, but do not submerge in a bath for at least a week. Dressings may come off after 48 hours.    PAIN MEDICATION: You will be given a prescription for pain medication at discharge. Please take these as directed. It is important to remember not to take medications on an empty stomach as this may cause nausea.    CALL IF YOU HAVE: (1) Fevers to more than 1010 F, (2) Unusual chest or leg pain, (3) Drainage or fluid from incision that may be foul smelling, increased tenderness or soreness at the wound or the wound edges are no longer together, redness or swelling at the incision site. Please do not hesitate to call with any other questions.     APPOINTMENT: Contact our office at [phone] for a follow-up appointment in 1 week following your procedure.    If you have any additional questions, please do not hesitate to call the office.    Office address:   [address], [address] [facility] [place] [address]

## 2022-09-02 NOTE — DISCHARGE PLANNING
HTH/SCP TCN chart review completed. Collaborated with ELEONORA Garrison prior to meeting with the pt. The most current review of medical record, knowledge of pt's PLOF and social support, LACE+ score of 25, 6 clicks scores of ( none entered) mobility were considered.      Pt seen at bedside, 2 family mbrs in the room. Verified HIPPA.   Mbr is AOX4, appears comfortable. Mbr endorses living with Significant other and children in a  2 gina home, denies  need for additional assistance with food/housing/transportation.  Mbr endorses being independent with ADLs and mobility without functional concerns at this time. Given this, no choice forms obtained during this visit.       Introduced TCN program. Provided education regarding post acute levels of care. Discussed HTH/SCP plan benefits (Meds to Beds, medical uber and [facility] transitional care). Pt verbalizes understanding.   Mbr will likely DC to home with outpatient follow-up  Anticipate no additional TCN needs at this time  TCN will continue to follow and collaborate with discharge planning team as additional post acute needs arise. Thank you.     Completed today:  -TCN called [phone] to set up PCP follow up appointment  Choice obtained: NONE  [facility] referral ( not sent) LACE 25

## 2022-09-02 NOTE — CARE PLAN
The patient is Stable - Low risk of patient condition declining or worsening    Shift Goals  Clinical Goals: IVF, comfort  Patient Goals: improve diet  Family Goals: Not present    Progress made toward(s) clinical / shift goals:    Problem: Knowledge Deficit - Standard  Goal: Patient and family/care givers will demonstrate understanding of plan of care, disease process/condition, diagnostic tests and medications  Outcome: Progressing       Patient is not progressing towards the following goals:

## 2022-09-02 NOTE — CARE PLAN
The patient is Watcher - Medium risk of patient condition declining or worsening    Shift Goals  Clinical Goals: IV fluids, pain/nausea control  Patient Goals: rest  Family Goals: SO at bedside    Progress made toward(s) clinical / shift goals:    Problem: Knowledge Deficit - Standard  Goal: Patient and family/care givers will demonstrate understanding of plan of care, disease process/condition, diagnostic tests and medications  Outcome: Progressing       Patient is not progressing towards the following goals:

## 2022-11-14 ENCOUNTER — HOSPITAL ENCOUNTER (OUTPATIENT)
Facility: MEDICAL CENTER | Age: 32
End: 2022-11-14
Attending: OBSTETRICS & GYNECOLOGY
Payer: COMMERCIAL

## 2022-11-14 PROCEDURE — 87661 TRICHOMONAS VAGINALIS AMPLIF: CPT

## 2022-11-14 PROCEDURE — 87491 CHLMYD TRACH DNA AMP PROBE: CPT

## 2022-11-14 PROCEDURE — 87591 N.GONORRHOEAE DNA AMP PROB: CPT

## 2022-11-14 PROCEDURE — 87624 HPV HI-RISK TYP POOLED RSLT: CPT

## 2022-11-14 PROCEDURE — 88175 CYTOPATH C/V AUTO FLUID REDO: CPT

## 2022-11-16 ENCOUNTER — NON-PROVIDER VISIT (OUTPATIENT)
Dept: OCCUPATIONAL MEDICINE | Facility: CLINIC | Age: 32
End: 2022-11-16

## 2022-11-16 DIAGNOSIS — Z11.1 ENCOUNTER FOR PPD TEST: ICD-10-CM

## 2022-11-16 PROCEDURE — 86580 TB INTRADERMAL TEST: CPT | Performed by: NURSE PRACTITIONER

## 2022-11-17 LAB
AMBIGUOUS DTTM AMBI4: NORMAL
C TRACH DNA GENITAL QL NAA+PROBE: NEGATIVE
CYTOLOGY REG CYTOL: NORMAL
HPV HR 12 DNA CVX QL NAA+PROBE: NEGATIVE
HPV16 DNA SPEC QL NAA+PROBE: NEGATIVE
HPV18 DNA SPEC QL NAA+PROBE: NEGATIVE
N GONORRHOEA DNA GENITAL QL NAA+PROBE: NEGATIVE
SPECIMEN SOURCE: NORMAL
SPECIMEN SOURCE: NORMAL

## 2022-11-18 ENCOUNTER — NON-PROVIDER VISIT (OUTPATIENT)
Dept: OCCUPATIONAL MEDICINE | Facility: CLINIC | Age: 32
End: 2022-11-18

## 2022-11-18 LAB
SPEC CONTAINER SPEC: NORMAL
SPECIMEN SOURCE: NORMAL
T VAGINALIS RRNA SPEC QL NAA+PROBE: NEGATIVE
TB WHEAL 3D P 5 TU DIAM: NORMAL MM

## 2022-11-21 NOTE — PROGRESS NOTES
Advance Care Planning   Ambulatory ACP Specialist Patient Outreach    Date:  11/21/2022  ACP Specialist:  ANTONIO Cesar    Outreach call to patient in follow-up to ACP Specialist referral from: oM Yuen PA-C    [x] PCP  [] Provider   [] Ambulatory Care Management [] Other for Reason:    [x] Advance Directive Assistance  [] Code Status Discussion  [] Complete Portable DNR Order  [] Discuss Goals of Care  [] Complete POST/MOST  [] Early ACP Decision-Making  [] Other    Date Referral Received: 11/18/22    Today's Outreach:  [x] First   [] Second  [] Third                               Third outreach made by []  phone  [] email []   [facility]     Intervention:  [x] Spoke with Patient  [] Left VM requesting return call      Outcome: ACP Specialist spoke with patient with  [id number] from [employer]. Patient requested an in person ACP conversation on 12/8/22 at 12:00 at Dr. Shobha Stubbs's office. Staff will coordinate the ACP meeting with [facility] in [facility] [place]. Next Step:   [x] ACP scheduled conversation  [] Outreach again in one week               [] Email / Mail ACP Info Sheets  [] Email / Mail Advance Directive            [] Close Referral. Routing closure to referring provider/staff and to ACP Specialist . [] Closure Letter mailed to Patient with Invitation to Contact ACP Specialist if/when ready.     Thank you for this referral. Assessed patient, vital signs stable, patient will call for pain medications.

## 2022-11-30 ENCOUNTER — NON-PROVIDER VISIT (OUTPATIENT)
Dept: OCCUPATIONAL MEDICINE | Facility: CLINIC | Age: 32
End: 2022-11-30

## 2022-11-30 DIAGNOSIS — Z11.1 ENCOUNTER FOR PPD TEST: Primary | ICD-10-CM

## 2022-11-30 PROCEDURE — 86580 TB INTRADERMAL TEST: CPT | Performed by: NURSE PRACTITIONER

## 2022-12-02 ENCOUNTER — NON-PROVIDER VISIT (OUTPATIENT)
Dept: OCCUPATIONAL MEDICINE | Facility: CLINIC | Age: 32
End: 2022-12-02

## 2022-12-02 LAB — TB WHEAL 3D P 5 TU DIAM: 0 MM

## 2022-12-16 ENCOUNTER — PHARMACY VISIT (OUTPATIENT)
Dept: PHARMACY | Facility: MEDICAL CENTER | Age: 32
End: 2022-12-16
Payer: COMMERCIAL

## 2022-12-16 ENCOUNTER — HOSPITAL ENCOUNTER (OUTPATIENT)
Dept: RADIOLOGY | Facility: MEDICAL CENTER | Age: 32
End: 2022-12-16
Attending: SURGERY
Payer: COMMERCIAL

## 2022-12-16 DIAGNOSIS — Q64.4 MALFORMATION OF URACHUS: ICD-10-CM

## 2022-12-16 PROCEDURE — 76857 US EXAM PELVIC LIMITED: CPT

## 2022-12-16 PROCEDURE — RXMED WILLOW AMBULATORY MEDICATION CHARGE: Performed by: SURGERY

## 2022-12-16 PROCEDURE — RXOTC WILLOW AMBULATORY OTC CHARGE: Performed by: PHARMACIST

## 2022-12-16 RX ORDER — AMOXICILLIN AND CLAVULANATE POTASSIUM 875; 125 MG/1; MG/1
TABLET, FILM COATED ORAL
Qty: 14 TABLET | Refills: 0 | Status: SHIPPED | OUTPATIENT
Start: 2022-12-16 | End: 2022-12-20

## 2022-12-20 ENCOUNTER — OFFICE VISIT (OUTPATIENT)
Dept: MEDICAL GROUP | Facility: MEDICAL CENTER | Age: 32
End: 2022-12-20
Payer: COMMERCIAL

## 2022-12-20 ENCOUNTER — PHARMACY VISIT (OUTPATIENT)
Dept: PHARMACY | Facility: MEDICAL CENTER | Age: 32
End: 2022-12-20
Payer: COMMERCIAL

## 2022-12-20 VITALS
TEMPERATURE: 98.2 F | DIASTOLIC BLOOD PRESSURE: 72 MMHG | SYSTOLIC BLOOD PRESSURE: 122 MMHG | WEIGHT: 145 LBS | BODY MASS INDEX: 24.75 KG/M2 | OXYGEN SATURATION: 97 % | RESPIRATION RATE: 16 BRPM | HEART RATE: 87 BPM | HEIGHT: 64 IN

## 2022-12-20 DIAGNOSIS — H10.32 ACUTE BACTERIAL CONJUNCTIVITIS OF LEFT EYE: ICD-10-CM

## 2022-12-20 DIAGNOSIS — R05.1 ACUTE COUGH: ICD-10-CM

## 2022-12-20 DIAGNOSIS — J02.9 ACUTE PHARYNGITIS, UNSPECIFIED ETIOLOGY: ICD-10-CM

## 2022-12-20 LAB
EXTERNAL QUALITY CONTROL: NORMAL
FLUAV+FLUBV AG SPEC QL IA: NORMAL
INT CON NEG: NEGATIVE
INT CON POS: POSITIVE
S PYO AG THROAT QL: NORMAL
SARS-COV+SARS-COV-2 AG RESP QL IA.RAPID: NEGATIVE

## 2022-12-20 PROCEDURE — 87426 SARSCOV CORONAVIRUS AG IA: CPT | Performed by: STUDENT IN AN ORGANIZED HEALTH CARE EDUCATION/TRAINING PROGRAM

## 2022-12-20 PROCEDURE — 87880 STREP A ASSAY W/OPTIC: CPT | Performed by: STUDENT IN AN ORGANIZED HEALTH CARE EDUCATION/TRAINING PROGRAM

## 2022-12-20 PROCEDURE — 99213 OFFICE O/P EST LOW 20 MIN: CPT | Performed by: STUDENT IN AN ORGANIZED HEALTH CARE EDUCATION/TRAINING PROGRAM

## 2022-12-20 PROCEDURE — 87804 INFLUENZA ASSAY W/OPTIC: CPT | Performed by: STUDENT IN AN ORGANIZED HEALTH CARE EDUCATION/TRAINING PROGRAM

## 2022-12-20 PROCEDURE — RXMED WILLOW AMBULATORY MEDICATION CHARGE: Performed by: STUDENT IN AN ORGANIZED HEALTH CARE EDUCATION/TRAINING PROGRAM

## 2022-12-20 RX ORDER — CYCLOBENZAPRINE HCL 5 MG
5 TABLET ORAL 2 TIMES DAILY PRN
COMMUNITY
Start: 2022-11-07 | End: 2022-12-20

## 2022-12-20 RX ORDER — BENZONATATE 100 MG/1
100 CAPSULE ORAL 3 TIMES DAILY PRN
Qty: 60 CAPSULE | Refills: 0 | Status: SHIPPED | OUTPATIENT
Start: 2022-12-20 | End: 2023-01-30

## 2022-12-20 RX ORDER — POLYMYXIN B SULFATE AND TRIMETHOPRIM 1; 10000 MG/ML; [USP'U]/ML
1 SOLUTION OPHTHALMIC EVERY 4 HOURS
Qty: 10 ML | Refills: 0 | Status: SHIPPED | OUTPATIENT
Start: 2022-12-20 | End: 2023-01-30

## 2022-12-20 ASSESSMENT — FIBROSIS 4 INDEX: FIB4 SCORE: 0.49

## 2022-12-20 NOTE — LETTER
December 20, 2022    To Whom It May Concern:         This is confirmation that Dee Cornejo attended her scheduled appointment with Candice Segura P.A.-C. on 12/20/22. Please excuse patient from work 12/20/22 and 12/21/22 due to recent illness.          If you have any questions please do not hesitate to call me at the phone number listed below.    Sincerely,          Candice Segura P.A.-C.  [phone]

## 2022-12-20 NOTE — PROGRESS NOTES
"Chief Complaint   Patient presents with    Body Aches    Cough        HPI:   Conjunctivitis  Patient presents today with conjunctivitis of her left eye.  Patient had significant discomfort and redness that started yesterday.  Patient notes that eye was goopy and sealed shut this morning.  So far, right eye is not affected.    Cough  Patient presents today for 5 days of productive cough, runny nose, congestion and body aches.  Patient notes that her symptoms have been progressing over the last 5 days.  Patient notes multiple ill contacts at work patient is not currently taking anything for her symptoms.    ROS:  No fever, cough, nausea, changes in bowel movements or skin rash.       EXAM:  /72 (BP Location: Right arm, Patient Position: Sitting, BP Cuff Size: Adult)   Pulse 87   Temp 36.8 °C (98.2 °F) (Temporal)   Resp 16   Ht 1.626 m (5' 4\")   Wt 65.8 kg (145 lb)   SpO2 97%   General: Alert, no conversational dyspnea or audible wheeze, non-toxic appearance.  Eyes: PERRL, conjunctiva slightly injected, no eye discharge.  Ears: Normal pinnae,TM's normal bilaterally.  Nares: Patent with thin mucus.  Sinuses: tender over maxillary / frontal sinuses.  Throat: Erythematous injection without exudate.   Neck: Supple, with moderately enlarged cervical nodes.  Lungs: Clear to auscultation bilaterally, no wheeze, crackles or rhonchi.   Heart: Regular rate without murmur.  Skin: Warm and dry without rash.     ASSESSMENT:   1. Acute pharyngitis, unspecified etiology  POCT negative for strep, influenza, COVID.  Educated on viral illness versus bacterial.  Will treat symptomatically and patient advised to follow-up if no improvement.  - POCT Rapid Strep A  - POCT Influenza A/B  - POCT SARS-COV Antigen TRISTON (Symptomatic only)    2. Acute cough  - benzonatate (TESSALON) 100 MG Cap; Take 1 Capsule by mouth 3 times a day as needed for Cough.  Dispense: 60 Capsule; Refill: 0    3. Acute bacterial conjunctivitis of left " eye  Acute, stable.  We will treat for possible bacterial conjunctivitis of left eye.  Unlikely allergic, and only in unilateral eye, no concern for bacterial conjunctivitis.  Discussed treatment with patient.  - polymixin-trimethoprim (POLYTRIM) 35245-8.1 UNIT/ML-% Solution; Administer 1 Drop into both eyes every 4 hours.  Dispense: 10 mL; Refill: 0      PLAN:  1. Educated patient that majority of upper respiratory infections are viral and do not need antibiotics.   2. Twice daily use of nasal saline rinse or Neti-Pot.  3. OTC anti-pyretics and decongestants as needed.  4. Follow-up in office or urgent care for worsening symptoms, difficulty breathing, lack of expected recovery, or should new symptoms or problems arise.

## 2023-01-03 ENCOUNTER — OFFICE VISIT (OUTPATIENT)
Dept: URGENT CARE | Facility: PHYSICIAN GROUP | Age: 33
End: 2023-01-03
Payer: COMMERCIAL

## 2023-01-03 VITALS
HEIGHT: 64 IN | BODY MASS INDEX: 23.56 KG/M2 | HEART RATE: 86 BPM | TEMPERATURE: 98.7 F | OXYGEN SATURATION: 98 % | WEIGHT: 138 LBS | SYSTOLIC BLOOD PRESSURE: 112 MMHG | RESPIRATION RATE: 16 BRPM | DIASTOLIC BLOOD PRESSURE: 64 MMHG

## 2023-01-03 DIAGNOSIS — B34.9 ACUTE VIRAL SYNDROME: ICD-10-CM

## 2023-01-03 PROBLEM — E66.01 MORBID OBESITY (HCC): Status: RESOLVED | Noted: 2022-05-24 | Resolved: 2023-01-03

## 2023-01-03 LAB
EXTERNAL QUALITY CONTROL: NORMAL
FLUAV+FLUBV AG SPEC QL IA: NEGATIVE
INT CON NEG: NEGATIVE
INT CON NEG: NEGATIVE
INT CON POS: POSITIVE
INT CON POS: POSITIVE
SARS-COV+SARS-COV-2 AG RESP QL IA.RAPID: NEGATIVE

## 2023-01-03 PROCEDURE — 87804 INFLUENZA ASSAY W/OPTIC: CPT | Performed by: EMERGENCY MEDICINE

## 2023-01-03 PROCEDURE — 99213 OFFICE O/P EST LOW 20 MIN: CPT | Performed by: EMERGENCY MEDICINE

## 2023-01-03 PROCEDURE — 87426 SARSCOV CORONAVIRUS AG IA: CPT | Performed by: EMERGENCY MEDICINE

## 2023-01-03 ASSESSMENT — ENCOUNTER SYMPTOMS
COUGH: 1
NAUSEA: 0
MYALGIAS: 1
FEVER: 1
SPUTUM PRODUCTION: 1
SORE THROAT: 1
SHORTNESS OF BREATH: 1

## 2023-01-03 ASSESSMENT — FIBROSIS 4 INDEX: FIB4 SCORE: 0.49

## 2023-01-03 NOTE — PATIENT INSTRUCTIONS
Use flonase 1 squirt each nostril twice daily x 3-4 days then daily as needed, Over-the-counter decongestant of your choice as directed (sudafed or similar, chlorpheniramine if history of high blood pressure)., Tylenol or ibuprofen as directed for pain or fever., Throat lozenges with benzocaine or salt water gargles may help with throat pain., Use honey for cough and/or sore throat - it has been shown to be better than even over the counter medications for cough severity and frequency., Drink plenty of fluids. , and Followup with your doctor if not improved, return if shortness of breath, vomiting, unable to swallow, worse.       IF your covid test is positive:    If you had covid symptoms  Day 0 of isolation is the day of symptom onset (for you this is Dec 31), regardless of when you tested positive  Day 1 is the first full day after the day your symptoms started  Day 5 is the 5th full day after the day your symptoms started (for you this is Jan 5)    Ending isolation timing is based on how serious your COVID-19 symptoms were. Loss of taste and smell may persist for weeks or months after recovery and need not delay the end of isolation.   -If you had no symptoms:  You may end isolation after day 5.    -If you had symptoms and:    - Your symptoms are improving:  You may end isolation after day 5 if you are fever-free for 24 hours (without the use of fever-reducing medication).  - Your symptoms are not improving:  Continue to isolate until you are fever-free for 24 hours (without the use of fever-reducing medication).  Your symptoms are improving.     CDCCOVIDISORECS: Regardless of vaccination status, you should isolate from others when you have COVID-19.  If you test positive for COVID-19, you stay home for at least 5 days and isolate from others in your home.  You are likely most infectious during these first 5 days. Wear a high-quality mask when you must be around others at home and in public.  After you have  ended isolation, if your COVID-19 symptoms worsen, restart your isolation at day 0   screening testing of asymptomatic people without known exposures will no longer be recommended in most community settings.  physical distance is just one component of how to protect yourself and others.  It is important to consider the risk in a particular setting, including local COVID-19 Community Levels and the important role of ventilation, when assessing the need to maintain physical distance.  Repeat testing is NOT recommended as your test may be positive well after isolation is no longer required per CDC.      See CDC for additional information (updated August 11, 2022):  https://www.cdc.gov/media/releases/2022/p0811-covid-guidance.html
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
## 2023-01-03 NOTE — LETTER
[facility] [address] [address]     January 3, 2023    Patient: Dee Cornejo   YOB: 1990   Date of Visit: 1/3/2023       To Whom It May Concern:    Dee Cornejo was seen and treated in our department on 1/3/2023. Please excuse the patient from work for the following dates: John 3-4  She may return without restrictions on: Jan 5 If fever free for at least 24 hours without medications.     If covid test is positive, cannot return to work until Jan 6.      Sincerely,     Nicole Estrada M.D.                 [id number]; Last Rx:[id number] Ordered   3  Prochlorperazine Maleate 10 MG Oral Tablet; TAKE 1 TABLET 3 times daily PRN NAUSEA; Therapy: [id number] to (Last Rx:[id number])  Requested for: [id number] 

## 2023-01-27 ENCOUNTER — PHARMACY VISIT (OUTPATIENT)
Dept: PHARMACY | Facility: MEDICAL CENTER | Age: 33
End: 2023-01-27
Payer: COMMERCIAL

## 2023-01-27 PROCEDURE — RXMED WILLOW AMBULATORY MEDICATION CHARGE: Performed by: INTERNAL MEDICINE

## 2023-01-27 RX ORDER — INFLUENZA A VIRUS A/BRISBANE/02/2018 IVR-190 (H1N1) ANTIGEN (FORMALDEHYDE INACTIVATED), INFLUENZA A VIRUS A/KANSAS/14/2017 X-327 (H3N2) ANTIGEN (FORMALDEHYDE INACTIVATED), INFLUENZA B VIRUS B/PHUKET/3073/2013 ANTIGEN (FORMALDEHYDE INACTIVATED), AND INFLUENZA B VIRUS B/MARYLAND/15/2016 BX-69A ANTIGEN (FORMALDEHYDE INACTIVATED) 15; 15; 15; 15 UG/.5ML; UG/.5ML; UG/.5ML; UG/.5ML
INJECTION, SUSPENSION INTRAMUSCULAR
Qty: 0.5 ML | Refills: 0 | Status: SHIPPED | OUTPATIENT
Start: 2023-01-27 | End: 2023-01-30

## 2023-01-30 ENCOUNTER — TELEMEDICINE (OUTPATIENT)
Dept: MEDICAL GROUP | Facility: MEDICAL CENTER | Age: 33
End: 2023-01-30
Payer: COMMERCIAL

## 2023-01-30 VITALS — WEIGHT: 138 LBS | BODY MASS INDEX: 23.56 KG/M2 | HEIGHT: 64 IN

## 2023-01-30 DIAGNOSIS — U07.1 COVID-19: ICD-10-CM

## 2023-01-30 PROCEDURE — 99213 OFFICE O/P EST LOW 20 MIN: CPT | Mod: 95 | Performed by: STUDENT IN AN ORGANIZED HEALTH CARE EDUCATION/TRAINING PROGRAM

## 2023-01-30 ASSESSMENT — FIBROSIS 4 INDEX: FIB4 SCORE: 0.49

## 2023-01-30 ASSESSMENT — PATIENT HEALTH QUESTIONNAIRE - PHQ9: CLINICAL INTERPRETATION OF PHQ2 SCORE: 0

## 2023-01-30 NOTE — LETTER
January 30, 2023    To Whom It May Concern:         This is confirmation that eDe Cornejo attended her scheduled appointment with Candice Segura P.A.-C. on 1/30/23.  Please excuse patient from work 1/30/2023 through 2/2/2023 due to recent illness.         If you have any questions please do not hesitate to call me at the phone number listed below.    Sincerely,          Candice Segura P.A.-C.  [phone]

## 2023-01-30 NOTE — PROGRESS NOTES
"Virtual Visit: Established Patient   This visit was conducted via Zoom using secure and encrypted videoconferencing technology.   The patient was in their home in the state [facility].    The patient's identity was confirmed and verbal consent was obtained for this virtual visit.     Subjective:   CC:   Chief Complaint   Patient presents with    Other     COVID positive this morning       Dee Cornejo is a 32 y.o. female presenting for evaluation and management of:    COVID  Presents today for concerns about COVID, notes that she tested positive this morning, symptoms started 3 days ago.  Notes that she is feeling ill, body aches, fatigue, headaches, runny nose and congestion.  Patient notes that this is her third time getting COVID.  Patient has never used Paxlovid.  Patient notes no ill contacts.  Patient treating headaches with ibuprofen.        ROS   See HPI         Objective:   Ht 1.626 m (5' 4\")   Wt 62.6 kg (138 lb)   BMI 23.69 kg/m²     Physical Exam:  Constitutional: Alert, no distress, well-groomed.  Skin: No rashes in visible areas.  Eye: Round. Conjunctiva clear, lids normal. No icterus.   ENMT: Lips pink without lesions, good dentition, moist mucous membranes. Phonation normal.  Neck: No masses, no thyromegaly. Moves freely without pain.  Respiratory: Unlabored respiratory effort, no cough or audible wheeze  Psych: Alert and oriented x3, normal affect and mood.     Assessment and Plan:   The following treatment plan was discussed:     1. COVID-19  Acute, stable.  Patient on day 4 of symptoms.  Patient advised to continue treating symptomatically as discussed during previous COVID.  Patient provided work note.  Discussed signs and symptoms that would need emergent evaluation.  Discussed follow-up if no improvement.    Follow-up: Return if symptoms worsen or fail to improve.         "

## 2023-06-05 ENCOUNTER — OFFICE VISIT (OUTPATIENT)
Dept: MEDICAL GROUP | Facility: MEDICAL CENTER | Age: 33
End: 2023-06-05
Payer: COMMERCIAL

## 2023-06-05 VITALS
HEART RATE: 81 BPM | SYSTOLIC BLOOD PRESSURE: 118 MMHG | HEIGHT: 64 IN | DIASTOLIC BLOOD PRESSURE: 74 MMHG | TEMPERATURE: 97.2 F | OXYGEN SATURATION: 98 % | RESPIRATION RATE: 16 BRPM | BODY MASS INDEX: 24.07 KG/M2 | WEIGHT: 141 LBS

## 2023-06-05 DIAGNOSIS — R19.8 UMBILICAL DISCHARGE: ICD-10-CM

## 2023-06-05 PROCEDURE — 3074F SYST BP LT 130 MM HG: CPT | Performed by: STUDENT IN AN ORGANIZED HEALTH CARE EDUCATION/TRAINING PROGRAM

## 2023-06-05 PROCEDURE — 3078F DIAST BP <80 MM HG: CPT | Performed by: STUDENT IN AN ORGANIZED HEALTH CARE EDUCATION/TRAINING PROGRAM

## 2023-06-05 PROCEDURE — 99213 OFFICE O/P EST LOW 20 MIN: CPT | Performed by: STUDENT IN AN ORGANIZED HEALTH CARE EDUCATION/TRAINING PROGRAM

## 2023-06-05 RX ORDER — CLOTRIMAZOLE 1 %
1 CREAM (GRAM) TOPICAL 2 TIMES DAILY
Qty: 12 G | Refills: 0 | Status: SHIPPED | OUTPATIENT
Start: 2023-06-05 | End: 2023-10-20

## 2023-06-05 ASSESSMENT — FIBROSIS 4 INDEX: FIB4 SCORE: 0.49

## 2023-06-05 NOTE — PROGRESS NOTES
"Subjective:     Chief Complaint   Patient presents with    Other     Umbilical discharge x 1 year         HPI:   Dee presents today with     Umbilical discharge  Patient presents today for concern about umbilical discharge.  Patient notes that this has been ongoing for several years, remembers that even prior to her gastric sleeve surgery but has become more frequent and increased discharge.  Patient has discussed this with her gastric sleeve surgeon and had ultrasound 12/16/22 to further evaluate.  Ultrasound demonstrates no underlying fluid collection or tracts.  Patient had CT abdomen pelvis 9/1/2022 with no significant findings.  Patient states that the yellow discharge is constant, denies redness or irritation surrounding umbilicus.  Patient notes occasionally umbilicus is itchy.  Patient notes that the discharge is yellow with a strong odor.      ROS:  Gen: no fevers/chills  Pulm: no sob, no cough  CV: no chest pain, no palpitations  GI: no nausea/vomiting, no diarrhea      Objective:     Exam:  /74 (BP Location: Left arm, Patient Position: Sitting, BP Cuff Size: Adult)   Pulse 81   Temp 36.2 °C (97.2 °F) (Temporal)   Resp 16   Ht 1.626 m (5' 4\")   Wt 64 kg (141 lb)   SpO2 98%   BMI 24.20 kg/m²  Body mass index is 24.2 kg/m².    Gen: Alert and oriented, No apparent distress.  Neck: Neck is supple without lymphadenopathy.  Lungs: Normal effort, CTA bilaterally, no wheezes, rhonchi, or rales  CV: Regular rate and rhythm. No murmurs, rubs, or gallops.  Ubilicus: No discharge noted on exam.  Deep umbilicus.  No surrounding erythema.  Abdomen: Soft, nondistended.  Patient tender to palpation generalized.    Assessment & Plan:     32 y.o. female with the following -     1. Umbilical discharge  Chronic, stable.  Patient notes umbilical discharge.  Discussed patient findings of ultrasound and CT.  No further imaging recommended at this time.  Discussed trial of treatment with clotrimazole x2 weeks.  " Discussed trial of antibiotic ointment x2 weeks if clotrimazole is not helpful.  Continue to wash with anti bacterial soap.  Follow-up if no improvement.  - clotrimazole (LOTRIMIN) 1 % Cream; Apply 1 Application topically 2 times a day.  Dispense: 12 g; Refill: 0       No follow-ups on file.    Please note that this dictation was created using voice recognition software. I have made every reasonable attempt to correct obvious errors, but I expect that there are errors of grammar and possibly content that I did not discover before finalizing the note.

## 2023-10-20 PROBLEM — M75.42 SUBACROMIAL IMPINGEMENT OF LEFT SHOULDER: Status: ACTIVE | Noted: 2023-10-20

## 2023-10-24 ENCOUNTER — APPOINTMENT (OUTPATIENT)
Dept: TELEHEALTH | Facility: TELEMEDICINE | Age: 33
End: 2023-10-24
Payer: COMMERCIAL

## 2023-10-25 ENCOUNTER — TELEMEDICINE (OUTPATIENT)
Dept: MEDICAL GROUP | Facility: MEDICAL CENTER | Age: 33
End: 2023-10-25
Payer: COMMERCIAL

## 2023-10-25 VITALS
WEIGHT: 143 LBS | OXYGEN SATURATION: 94 % | HEART RATE: 100 BPM | HEIGHT: 64 IN | BODY MASS INDEX: 24.41 KG/M2 | TEMPERATURE: 97.8 F

## 2023-10-25 DIAGNOSIS — U07.1 COVID-19: ICD-10-CM

## 2023-10-25 PROCEDURE — 99213 OFFICE O/P EST LOW 20 MIN: CPT | Mod: 95 | Performed by: STUDENT IN AN ORGANIZED HEALTH CARE EDUCATION/TRAINING PROGRAM

## 2023-10-25 ASSESSMENT — FIBROSIS 4 INDEX: FIB4 SCORE: 0.51

## 2023-10-25 NOTE — PROGRESS NOTES
"Virtual Visit: Established Patient   This visit was conducted via Zoom using secure and encrypted videoconferencing technology.   The patient was in their home in the state of [place].    The patient's identity was confirmed and verbal consent was obtained for this virtual visit.     Subjective:   CC:   Chief Complaint   Patient presents with    Coronavirus Screening     At Home Positive yesterday, SOB, chest pain, mucus cough, body ache, fatigue, sore throat (Saturday) , headache, x Sunday        Dee Cornejo is a 33 y.o. female presenting for evaluation and management of:    COVID  Patient presents today after testing positive for COVID yesterday.  Patient notes that she for started having symptoms 4 days ago with mucus, dry cough, headache.  Patient has had COVID 3 times previously, has had previous vaccines but no booster.  Patient presents today for concern that symptoms feel deep in her lungs at this time.  Patient notes that she has a deep cough and shortness of breath.  Normal respirations and clear lung sounds via virtual visit but difficult to fully appreciate/evaluate.  Patient using over-the-counter medication with minimal relief.      ROS   See HPI       Objective:   Pulse 100 Comment: per pt  Temp 36.6 °C (97.8 °F) Comment: per pt  Ht 1.626 m (5' 4\") Comment: per pt  Wt 64.9 kg (143 lb) Comment: per pt  SpO2 94% Comment: per pt  Breastfeeding No   BMI 24.55 kg/m²     Physical Exam:  Constitutional: Alert, no distress, well-groomed.  Skin: No rashes in visible areas.  Eye: Round. Conjunctiva clear, lids normal. No icterus.   ENMT: Lips pink without lesions, good dentition, moist mucous membranes. Phonation normal.  Neck: No masses, no thyromegaly. Moves freely without pain.  Respiratory: Unlabored respiratory effort, no cough or audible wheeze  Psych: Alert and oriented x3, normal affect and mood.     Assessment and Plan:   The following treatment plan was discussed:     1. " COVID-19  - Nirmatrelvir&Ritonavir 300/100 20 x 150 MG & 10 x 100MG Tablet Therapy Pack; Take 300 mg nirmatrelvir (two 150 mg tablets) with 100 mg ritonavir (one 100 mg tablet) by mouth, with all three tablets taken together twice daily for 5 days.  Dispense: 30 Each; Refill: 0  Discussed starting patient on Paxlovid and given strict precautions about being seen in ED or urgent care if continuing to have shortness of breath or any worsening symptoms.    Follow-up: No follow-ups on file.

## 2023-12-01 ENCOUNTER — PHARMACY VISIT (OUTPATIENT)
Dept: PHARMACY | Facility: MEDICAL CENTER | Age: 33
End: 2023-12-01
Payer: COMMERCIAL

## 2023-12-01 PROCEDURE — RXMED WILLOW AMBULATORY MEDICATION CHARGE: Performed by: INTERNAL MEDICINE

## 2023-12-01 RX ORDER — INFLUENZA A VIRUS A/BRISBANE/02/2018 IVR-190 (H1N1) ANTIGEN (FORMALDEHYDE INACTIVATED), INFLUENZA A VIRUS A/KANSAS/14/2017 X-327 (H3N2) ANTIGEN (FORMALDEHYDE INACTIVATED), INFLUENZA B VIRUS B/PHUKET/3073/2013 ANTIGEN (FORMALDEHYDE INACTIVATED), AND INFLUENZA B VIRUS B/MARYLAND/15/2016 BX-69A ANTIGEN (FORMALDEHYDE INACTIVATED) 15; 15; 15; 15 UG/.5ML; UG/.5ML; UG/.5ML; UG/.5ML
INJECTION, SUSPENSION INTRAMUSCULAR
Qty: 0.5 ML | Refills: 0 | OUTPATIENT
Start: 2023-12-01

## 2024-01-03 ENCOUNTER — DOCUMENTATION (OUTPATIENT)
Dept: HEALTH INFORMATION MANAGEMENT | Facility: OTHER | Age: 34
End: 2024-01-03
Payer: COMMERCIAL

## 2024-02-19 ENCOUNTER — TELEPHONE (OUTPATIENT)
Dept: HEALTH INFORMATION MANAGEMENT | Facility: OTHER | Age: 34
End: 2024-02-19
Payer: COMMERCIAL

## 2024-02-20 ENCOUNTER — HOSPITAL ENCOUNTER (OUTPATIENT)
Dept: LAB | Facility: MEDICAL CENTER | Age: 34
End: 2024-02-20
Attending: OBSTETRICS & GYNECOLOGY
Payer: COMMERCIAL

## 2024-02-20 LAB
ABO GROUP BLD: NORMAL
AMORPH CRY #/AREA URNS HPF: PRESENT /HPF
APPEARANCE UR: ABNORMAL
B-HCG SERPL-ACNC: 6938 MIU/ML (ref 0–5)
BACTERIA #/AREA URNS HPF: NEGATIVE /HPF
BASOPHILS # BLD AUTO: 0.4 % (ref 0–1.8)
BASOPHILS # BLD: 0.03 K/UL (ref 0–0.12)
BILIRUB UR QL STRIP.AUTO: NEGATIVE
BLD GP AB SCN SERPL QL: NORMAL
COLOR UR: YELLOW
EOSINOPHIL # BLD AUTO: 0.03 K/UL (ref 0–0.51)
EOSINOPHIL NFR BLD: 0.4 % (ref 0–6.9)
EPI CELLS #/AREA URNS HPF: NEGATIVE /HPF
ERYTHROCYTE [DISTWIDTH] IN BLOOD BY AUTOMATED COUNT: 42.5 FL (ref 35.9–50)
EST. AVERAGE GLUCOSE BLD GHB EST-MCNC: 91 MG/DL
GLUCOSE UR STRIP.AUTO-MCNC: NEGATIVE MG/DL
HBA1C MFR BLD: 4.8 % (ref 4–5.6)
HBV SURFACE AG SER QL: ABNORMAL
HCT VFR BLD AUTO: 39 % (ref 37–47)
HCV AB SER QL: NORMAL
HGB BLD-MCNC: 13.3 G/DL (ref 12–16)
HIV 1+2 AB+HIV1 P24 AG SERPL QL IA: NORMAL
HYALINE CASTS #/AREA URNS LPF: ABNORMAL /LPF
IMM GRANULOCYTES # BLD AUTO: 0.02 K/UL (ref 0–0.11)
IMM GRANULOCYTES NFR BLD AUTO: 0.2 % (ref 0–0.9)
KETONES UR STRIP.AUTO-MCNC: NEGATIVE MG/DL
LEUKOCYTE ESTERASE UR QL STRIP.AUTO: NEGATIVE
LYMPHOCYTES # BLD AUTO: 2.87 K/UL (ref 1–4.8)
LYMPHOCYTES NFR BLD: 35.3 % (ref 22–41)
MCH RBC QN AUTO: 32.6 PG (ref 27–33)
MCHC RBC AUTO-ENTMCNC: 34.1 G/DL (ref 32.2–35.5)
MCV RBC AUTO: 95.6 FL (ref 81.4–97.8)
MICRO URNS: ABNORMAL
MONOCYTES # BLD AUTO: 0.47 K/UL (ref 0–0.85)
MONOCYTES NFR BLD AUTO: 5.8 % (ref 0–13.4)
NEUTROPHILS # BLD AUTO: 4.71 K/UL (ref 1.82–7.42)
NEUTROPHILS NFR BLD: 57.9 % (ref 44–72)
NITRITE UR QL STRIP.AUTO: NEGATIVE
NRBC # BLD AUTO: 0 K/UL
NRBC BLD-RTO: 0 /100 WBC (ref 0–0.2)
PH UR STRIP.AUTO: 7.5 [PH] (ref 5–8)
PLATELET # BLD AUTO: 273 K/UL (ref 164–446)
PMV BLD AUTO: 9.7 FL (ref 9–12.9)
PROT UR QL STRIP: NEGATIVE MG/DL
RBC # BLD AUTO: 4.08 M/UL (ref 4.2–5.4)
RBC # URNS HPF: ABNORMAL /HPF
RBC UR QL AUTO: NEGATIVE
RH BLD: NORMAL
RUBV AB SER QL: 297 IU/ML
SP GR UR STRIP.AUTO: 1.02
T PALLIDUM AB SER QL IA: ABNORMAL
TSH SERPL DL<=0.005 MIU/L-ACNC: 1.65 UIU/ML (ref 0.38–5.33)
UROBILINOGEN UR STRIP.AUTO-MCNC: 0.2 MG/DL
WBC # BLD AUTO: 8.1 K/UL (ref 4.8–10.8)
WBC #/AREA URNS HPF: ABNORMAL /HPF

## 2024-02-20 PROCEDURE — 86592 SYPHILIS TEST NON-TREP QUAL: CPT

## 2024-02-20 PROCEDURE — 86900 BLOOD TYPING SEROLOGIC ABO: CPT

## 2024-02-20 PROCEDURE — 83036 HEMOGLOBIN GLYCOSYLATED A1C: CPT

## 2024-02-20 PROCEDURE — 86850 RBC ANTIBODY SCREEN: CPT

## 2024-02-20 PROCEDURE — 86803 HEPATITIS C AB TEST: CPT

## 2024-02-20 PROCEDURE — 85025 COMPLETE CBC W/AUTO DIFF WBC: CPT

## 2024-02-20 PROCEDURE — 81001 URINALYSIS AUTO W/SCOPE: CPT

## 2024-02-20 PROCEDURE — 86762 RUBELLA ANTIBODY: CPT

## 2024-02-20 PROCEDURE — 84702 CHORIONIC GONADOTROPIN TEST: CPT

## 2024-02-20 PROCEDURE — 87340 HEPATITIS B SURFACE AG IA: CPT

## 2024-02-20 PROCEDURE — 36415 COLL VENOUS BLD VENIPUNCTURE: CPT

## 2024-02-20 PROCEDURE — 86901 BLOOD TYPING SEROLOGIC RH(D): CPT

## 2024-02-20 PROCEDURE — 87389 HIV-1 AG W/HIV-1&-2 AB AG IA: CPT

## 2024-02-20 PROCEDURE — 84443 ASSAY THYROID STIM HORMONE: CPT

## 2024-02-20 PROCEDURE — 86780 TREPONEMA PALLIDUM: CPT

## 2024-02-25 ENCOUNTER — HOSPITAL ENCOUNTER (EMERGENCY)
Facility: MEDICAL CENTER | Age: 34
End: 2024-02-26
Attending: EMERGENCY MEDICINE
Payer: COMMERCIAL

## 2024-02-25 ENCOUNTER — APPOINTMENT (OUTPATIENT)
Dept: RADIOLOGY | Facility: MEDICAL CENTER | Age: 34
End: 2024-02-25
Attending: EMERGENCY MEDICINE
Payer: COMMERCIAL

## 2024-02-25 DIAGNOSIS — O20.0 THREATENED MISCARRIAGE: ICD-10-CM

## 2024-02-25 LAB
ALBUMIN SERPL BCP-MCNC: 4.2 G/DL (ref 3.2–4.9)
ALBUMIN/GLOB SERPL: 1.7 G/DL
ALP SERPL-CCNC: 65 U/L (ref 30–99)
ALT SERPL-CCNC: 11 U/L (ref 2–50)
ANION GAP SERPL CALC-SCNC: 10 MMOL/L (ref 7–16)
APPEARANCE UR: CLEAR
AST SERPL-CCNC: 12 U/L (ref 12–45)
B-HCG SERPL-ACNC: ABNORMAL MIU/ML (ref 0–5)
BASOPHILS # BLD AUTO: 0.4 % (ref 0–1.8)
BASOPHILS # BLD: 0.04 K/UL (ref 0–0.12)
BILIRUB SERPL-MCNC: 0.4 MG/DL (ref 0.1–1.5)
BILIRUB UR QL STRIP.AUTO: NEGATIVE
BUN SERPL-MCNC: 9 MG/DL (ref 8–22)
CALCIUM ALBUM COR SERPL-MCNC: 8.8 MG/DL (ref 8.5–10.5)
CALCIUM SERPL-MCNC: 9 MG/DL (ref 8.5–10.5)
CHLORIDE SERPL-SCNC: 104 MMOL/L (ref 96–112)
CO2 SERPL-SCNC: 23 MMOL/L (ref 20–33)
COLOR UR: YELLOW
CREAT SERPL-MCNC: 0.53 MG/DL (ref 0.5–1.4)
EOSINOPHIL # BLD AUTO: 0.03 K/UL (ref 0–0.51)
EOSINOPHIL NFR BLD: 0.3 % (ref 0–6.9)
ERYTHROCYTE [DISTWIDTH] IN BLOOD BY AUTOMATED COUNT: 40.7 FL (ref 35.9–50)
GFR SERPLBLD CREATININE-BSD FMLA CKD-EPI: 124 ML/MIN/1.73 M 2
GLOBULIN SER CALC-MCNC: 2.5 G/DL (ref 1.9–3.5)
GLUCOSE SERPL-MCNC: 90 MG/DL (ref 65–99)
GLUCOSE UR STRIP.AUTO-MCNC: NEGATIVE MG/DL
HCT VFR BLD AUTO: 36.8 % (ref 37–47)
HGB BLD-MCNC: 13 G/DL (ref 12–16)
IMM GRANULOCYTES # BLD AUTO: 0.02 K/UL (ref 0–0.11)
IMM GRANULOCYTES NFR BLD AUTO: 0.2 % (ref 0–0.9)
KETONES UR STRIP.AUTO-MCNC: NEGATIVE MG/DL
LEUKOCYTE ESTERASE UR QL STRIP.AUTO: NEGATIVE
LIPASE SERPL-CCNC: 22 U/L (ref 11–82)
LYMPHOCYTES # BLD AUTO: 1.99 K/UL (ref 1–4.8)
LYMPHOCYTES NFR BLD: 21.9 % (ref 22–41)
MCH RBC QN AUTO: 32.3 PG (ref 27–33)
MCHC RBC AUTO-ENTMCNC: 35.3 G/DL (ref 32.2–35.5)
MCV RBC AUTO: 91.5 FL (ref 81.4–97.8)
MICRO URNS: NORMAL
MONOCYTES # BLD AUTO: 0.67 K/UL (ref 0–0.85)
MONOCYTES NFR BLD AUTO: 7.4 % (ref 0–13.4)
NEUTROPHILS # BLD AUTO: 6.33 K/UL (ref 1.82–7.42)
NEUTROPHILS NFR BLD: 69.8 % (ref 44–72)
NITRITE UR QL STRIP.AUTO: NEGATIVE
NRBC # BLD AUTO: 0 K/UL
NRBC BLD-RTO: 0 /100 WBC (ref 0–0.2)
PH UR STRIP.AUTO: 6.5 [PH] (ref 5–8)
PLATELET # BLD AUTO: 275 K/UL (ref 164–446)
PMV BLD AUTO: 9.3 FL (ref 9–12.9)
POTASSIUM SERPL-SCNC: 3.5 MMOL/L (ref 3.6–5.5)
PROT SERPL-MCNC: 6.7 G/DL (ref 6–8.2)
PROT UR QL STRIP: NEGATIVE MG/DL
RBC # BLD AUTO: 4.02 M/UL (ref 4.2–5.4)
RBC UR QL AUTO: NEGATIVE
SODIUM SERPL-SCNC: 137 MMOL/L (ref 135–145)
SP GR UR STRIP.AUTO: 1.02
UROBILINOGEN UR STRIP.AUTO-MCNC: 1 MG/DL
WBC # BLD AUTO: 9.1 K/UL (ref 4.8–10.8)

## 2024-02-25 PROCEDURE — 86901 BLOOD TYPING SEROLOGIC RH(D): CPT

## 2024-02-25 PROCEDURE — 76801 OB US < 14 WKS SINGLE FETUS: CPT

## 2024-02-25 PROCEDURE — 85025 COMPLETE CBC W/AUTO DIFF WBC: CPT

## 2024-02-25 PROCEDURE — 36415 COLL VENOUS BLD VENIPUNCTURE: CPT

## 2024-02-25 PROCEDURE — 99284 EMERGENCY DEPT VISIT MOD MDM: CPT

## 2024-02-25 PROCEDURE — 83690 ASSAY OF LIPASE: CPT

## 2024-02-25 PROCEDURE — 80053 COMPREHEN METABOLIC PANEL: CPT

## 2024-02-25 PROCEDURE — 81003 URINALYSIS AUTO W/O SCOPE: CPT

## 2024-02-25 PROCEDURE — 84702 CHORIONIC GONADOTROPIN TEST: CPT

## 2024-02-25 ASSESSMENT — FIBROSIS 4 INDEX: FIB4 SCORE: 0.5

## 2024-02-26 VITALS
OXYGEN SATURATION: 96 % | HEART RATE: 68 BPM | HEIGHT: 64 IN | WEIGHT: 151.68 LBS | RESPIRATION RATE: 20 BRPM | DIASTOLIC BLOOD PRESSURE: 58 MMHG | SYSTOLIC BLOOD PRESSURE: 90 MMHG | TEMPERATURE: 97.7 F | BODY MASS INDEX: 25.89 KG/M2

## 2024-02-26 LAB
NUMBER OF RH DOSES IND 8505RD: NORMAL
RH BLD: NORMAL

## 2024-02-26 NOTE — ED NOTES
Discharge instructions given and discussed, signed copy in chart. Pt verbalized understanding and all questions answered.Pt discharged in stable condition on room air. Personal belongings given to patient.

## 2024-02-26 NOTE — ED PROVIDER NOTES
ED Provider Note    CHIEF COMPLAINT  Chief Complaint   Patient presents with    Vaginal Bleeding     Pt reports she noticed some spotting about 30 minutes ago. Pt reports she is unsure how far along she is but has had a positive pregnancy.        EXTERNAL RECORDS REVIEWED      HPI/ROS  LIMITATION TO HISTORY   Select: : None    OUTSIDE HISTORIAN(S):      Dee Cornejo is a 33 y.o.  unknown dates female who presents to the emergency department chief complaint of vaginal bleeding.  Patient reports that she is not established with OB for this pregnancy pregnancy test positive several days prior.  Patient reports this evening she went to the bathroom and she reported blood into the toilet bowl and then had some blood on paper when wiping.  Some minimal lower abdominal cramping somewhat on the right rather greater than the left.  She has had no fevers no chills no urinary issues no stooling issues.     PAST MEDICAL HISTORY   has a past medical history of COVID-19 (, 21), Morbid obesity (HCC) (2022), Pain (2022), PCOS (polycystic ovarian syndrome) (2022), and UTI (lower urinary tract infection).    SURGICAL HISTORY   has a past surgical history that includes lap,cholecystectomy (); cholecystectomy (); cholecystectomy; other (); lap, jesu restrict proc, longitudinal gas* (N/A, 2022); shldr arthroscop part debride 1-2 (Left, 2023); shldr arthroscop,part acromioplas (Left, 2023); and repair biceps long tendon (Left, 2023).    FAMILY HISTORY  Family History   Problem Relation Age of Onset    Cancer Maternal Aunt         stomach    Arthritis Mother     Heart Disease Maternal Grandfather     Stroke Maternal Grandfather        SOCIAL HISTORY  Social History     Tobacco Use    Smoking status: Former     Current packs/day: 0.00     Types: Cigarettes     Quit date:      Years since quitting: 15.1    Smokeless tobacco: Never    Tobacco comments:     1  "yr   Vaping Use    Vaping Use: Never used   Substance and Sexual Activity    Alcohol use: Not Currently    Drug use: Yes     Types: Marijuana     Comment: cbd gtts intermittently    Sexual activity: Yes       CURRENT MEDICATIONS  Home Medications       Reviewed by Marilynn Cedeno R.N. (Registered Nurse) on 02/25/24 at 2155  Med List Status: Not Addressed     Medication Last Dose Status   acetaminophen (TYLENOL 8 HOUR) 650 MG CR tablet  Active   azithromycin (ZITHROMAX) 500 MG tablet  Active   DULoxetine (CYMBALTA) 30 MG Cap DR Particles  Active   influenza vaccine quad (FLUZONE QUADRIVALENT) 0.5 ML Suspension Prefilled Syringe injection  Active   lansoprazole (PREVACID) 30 MG CAPSULE DELAYED RELEASE  Active   LO LOESTRIN FE 1 MG-10 MCG / 10 MCG Tab  Active   ondansetron (ZOFRAN) 4 MG Tab tablet  Active   oxyCODONE immediate-release (ROXICODONE) 5 MG Tab  Active   therapeutic multivitamin-minerals (THERAGRAN-M) Tab  Active                    ALLERGIES  Allergies   Allergen Reactions    Famotidine Hives and Rash    Omeprazole Hives and Rash       PHYSICAL EXAM  VITAL SIGNS: /76   Pulse 90   Temp 36.1 °C (97 °F) (Temporal)   Resp 18   Ht 1.626 m (5' 4\")   Wt 68.8 kg (151 lb 10.8 oz)   LMP  (LMP Unknown)   SpO2 100%   BMI 26.04 kg/m²    Pulse ox interpretation: I interpret this pulse ox as normal.  Constitutional: Alert and oriented x 3, minimal distress  HEENT: Atraumatic normocephalic, pupils are equal round reactive to light extraocular movements are intact. The nares is clear, external ears are normal, mouth shows moist mucous membranes normal dentition for age  Neck: Supple, no JVD no tracheal deviation  Cardiovascular: Regular rate and rhythm no murmur rub or gallop 2+ pulses peripherally x4  Thorax & Lungs: No respiratory distress, no wheezes rales or rhonchi, No chest tenderness.   GI: Soft nontender nondistended positive bowel sounds, no peritoneal signs  Skin: Warm dry no acute rash or " lesion  Musculoskeletal: Moving all extremities with full range and 5 of 5 strength no acute  deformity  Neurologic: Cranial nerves III through XII are grossly intact no sensory deficit no cerebellar dysfunction   Psychiatric: Appropriate affect for situation at this time          DIAGNOSTIC STUDIES / PROCEDURES  Results for orders placed or performed during the hospital encounter of 02/20/24   HCG QUANTITATIVE   Result Value Ref Range    Bhcg 6938.0 (H) 0.0 - 5.0 mIU/mL        Results for orders placed or performed during the hospital encounter of 02/25/24   CBC WITH DIFFERENTIAL   Result Value Ref Range    WBC 9.1 4.8 - 10.8 K/uL    RBC 4.02 (L) 4.20 - 5.40 M/uL    Hemoglobin 13.0 12.0 - 16.0 g/dL    Hematocrit 36.8 (L) 37.0 - 47.0 %    MCV 91.5 81.4 - 97.8 fL    MCH 32.3 27.0 - 33.0 pg    MCHC 35.3 32.2 - 35.5 g/dL    RDW 40.7 35.9 - 50.0 fL    Platelet Count 275 164 - 446 K/uL    MPV 9.3 9.0 - 12.9 fL    Neutrophils-Polys 69.80 44.00 - 72.00 %    Lymphocytes 21.90 (L) 22.00 - 41.00 %    Monocytes 7.40 0.00 - 13.40 %    Eosinophils 0.30 0.00 - 6.90 %    Basophils 0.40 0.00 - 1.80 %    Immature Granulocytes 0.20 0.00 - 0.90 %    Nucleated RBC 0.00 0.00 - 0.20 /100 WBC    Neutrophils (Absolute) 6.33 1.82 - 7.42 K/uL    Lymphs (Absolute) 1.99 1.00 - 4.80 K/uL    Monos (Absolute) 0.67 0.00 - 0.85 K/uL    Eos (Absolute) 0.03 0.00 - 0.51 K/uL    Baso (Absolute) 0.04 0.00 - 0.12 K/uL    Immature Granulocytes (abs) 0.02 0.00 - 0.11 K/uL    NRBC (Absolute) 0.00 K/uL   COMP METABOLIC PANEL   Result Value Ref Range    Sodium 137 135 - 145 mmol/L    Potassium 3.5 (L) 3.6 - 5.5 mmol/L    Chloride 104 96 - 112 mmol/L    Co2 23 20 - 33 mmol/L    Anion Gap 10.0 7.0 - 16.0    Glucose 90 65 - 99 mg/dL    Bun 9 8 - 22 mg/dL    Creatinine 0.53 0.50 - 1.40 mg/dL    Calcium 9.0 8.5 - 10.5 mg/dL    Correct Calcium 8.8 8.5 - 10.5 mg/dL    AST(SGOT) 12 12 - 45 U/L    ALT(SGPT) 11 2 - 50 U/L    Alkaline Phosphatase 65 30 - 99 U/L    Total  Bilirubin 0.4 0.1 - 1.5 mg/dL    Albumin 4.2 3.2 - 4.9 g/dL    Total Protein 6.7 6.0 - 8.2 g/dL    Globulin 2.5 1.9 - 3.5 g/dL    A-G Ratio 1.7 g/dL   LIPASE   Result Value Ref Range    Lipase 22 11 - 82 U/L   HCG QUANTITATIVE   Result Value Ref Range    Bhcg 72651.0 (H) 0.0 - 5.0 mIU/mL   URINALYSIS,CULTURE IF INDICATED    Specimen: Urine   Result Value Ref Range    Color Yellow     Character Clear     Specific Gravity 1.021 <1.035    Ph 6.5 5.0 - 8.0    Glucose Negative Negative mg/dL    Ketones Negative Negative mg/dL    Protein Negative Negative mg/dL    Bilirubin Negative Negative    Urobilinogen, Urine 1.0 Negative    Nitrite Negative Negative    Leukocyte Esterase Negative Negative    Occult Blood Negative Negative    Micro Urine Req see below    RH Type for Rhogam from E.D.   Result Value Ref Range    Emergency Department Rh Typing POS    ESTIMATED GFR   Result Value Ref Range    GFR (CKD-EPI) 124 >60 mL/min/1.73 m 2       RADIOLOGY  I have independently interpreted the diagnostic imaging associated with this visit and am waiting the final reading from the radiologist.   My preliminary interpretation is as follows:   Intrauterine yolk sac measuring 6 weeks 1 day.  Fetal heart rate measuring approximately 64 bpm left-sided ovarian cyst likely corpus luteal cyst      Radiologist interpretation:   US-OB 1ST TRIMESTER SINGLE GEST Is the patient pregnant? Yes   Final Result      1.  Single living intrauterine pregnancy at 6 weeks, 1 days estimated gestational age.            COURSE & MEDICAL DECISION MAKING    ED Observation Status? No; Patient does not meet criteria for ED Observation.     ASSESSMENT, COURSE AND PLAN  Care Narrative: Ultrasound shows fetus at 6 weeks 1 day fairly low heart rate.  I discussed the possible implications of this with the patient.  Her beta-hCG is increasing appropriately she is Rh+ no sign of urinary tract infection she is in no pain at this time repeat exam is benign.  Discussed at  "length with patient the importance of follow-up for repeat ultrasound and hCG.  Patient actually has an appointment with her obstetrician in about 48 hours.  She is instructed to return here in the meantime should she have worsening pain bleeding lightheadedness any other acute symptom change or concern otherwise discharged in stable and improved condition.        ADDITIONAL PROBLEM LIST    DISPOSITION AND DISCUSSIONS      I have discussed management of the patient with the following physicians and ANTONINO's:        Discussion of management with other QHP or appropriate source(s):     Escalation of care considered, and ultimately not performed:    Barriers to care at this time, including but not limited to: .     Decision tools and prescription drugs considered including, but not limited to: .  BP 90/58   Pulse 68   Temp 36.5 °C (97.7 °F) (Temporal)   Resp 20   Ht 1.626 m (5' 4\")   Wt 68.8 kg (151 lb 10.8 oz)   LMP  (LMP Unknown)   SpO2 96%   BMI 26.04 kg/m²     Rachel Gardner M.D.  [address] [place] [id number] [phone]      As Scheduled    [facility], Emergency Dept  [address] [facility] [address] [phone]    in 12-24 hours if symptoms persist, immediately If symptoms worsen, or if you develop any other symptoms or concerns      FINAL DIAGNOSIS  1. Threatened miscarriage Active          Electronically signed by: Humberto Haji M.D.      "

## 2024-02-26 NOTE — ED TRIAGE NOTES
"Chief Complaint   Patient presents with    Vaginal Bleeding     Pt reports she noticed some spotting about 30 minutes ago. Pt reports she is unsure how far along she is but has had a positive pregnancy.      Pt ambulatory to triage reporting \"I think I may be having a miscarriage\". Pt reports when she got home from work today she felt a sharp cramp and noticed blood when she used the restroom. Pt has not had an US for pregnancy yet, but reports having labs done not too long ago for hcg levels. Pt denies any history of miscarriages, and has had 2 previous successful pregnancies.   "

## 2024-03-14 ENCOUNTER — OFFICE VISIT (OUTPATIENT)
Dept: URGENT CARE | Facility: PHYSICIAN GROUP | Age: 34
End: 2024-03-14
Payer: COMMERCIAL

## 2024-03-14 VITALS
RESPIRATION RATE: 12 BRPM | HEIGHT: 64 IN | WEIGHT: 157.2 LBS | TEMPERATURE: 99.6 F | BODY MASS INDEX: 26.84 KG/M2 | OXYGEN SATURATION: 100 % | DIASTOLIC BLOOD PRESSURE: 74 MMHG | SYSTOLIC BLOOD PRESSURE: 102 MMHG | HEART RATE: 88 BPM

## 2024-03-14 DIAGNOSIS — J02.0 STREP PHARYNGITIS: ICD-10-CM

## 2024-03-14 DIAGNOSIS — J02.9 PHARYNGITIS, UNSPECIFIED ETIOLOGY: ICD-10-CM

## 2024-03-14 LAB — S PYO DNA SPEC NAA+PROBE: DETECTED

## 2024-03-14 PROCEDURE — 99213 OFFICE O/P EST LOW 20 MIN: CPT

## 2024-03-14 PROCEDURE — 3074F SYST BP LT 130 MM HG: CPT

## 2024-03-14 PROCEDURE — 3078F DIAST BP <80 MM HG: CPT

## 2024-03-14 PROCEDURE — 87651 STREP A DNA AMP PROBE: CPT

## 2024-03-14 RX ORDER — AMOXICILLIN 500 MG/1
500 CAPSULE ORAL 2 TIMES DAILY
Qty: 20 CAPSULE | Refills: 0 | Status: SHIPPED | OUTPATIENT
Start: 2024-03-14 | End: 2024-03-24

## 2024-03-14 RX ORDER — LIDOCAINE HYDROCHLORIDE 20 MG/ML
5 SOLUTION OROPHARYNGEAL EVERY 6 HOURS PRN
Qty: 100 ML | Refills: 0 | Status: CANCELLED | OUTPATIENT
Start: 2024-03-14 | End: 2024-03-19

## 2024-03-14 ASSESSMENT — ENCOUNTER SYMPTOMS
SPUTUM PRODUCTION: 0
VOMITING: 0
SINUS PAIN: 0
WHEEZING: 0
FEVER: 1
WEAKNESS: 0
HEADACHES: 0
DIARRHEA: 0
NAUSEA: 0
STRIDOR: 0
CHILLS: 1
SHORTNESS OF BREATH: 0
SORE THROAT: 1
MYALGIAS: 0
COUGH: 0
DIZZINESS: 0

## 2024-03-14 ASSESSMENT — FIBROSIS 4 INDEX: FIB4 SCORE: 0.45
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
## 2024-05-26 ENCOUNTER — HOSPITAL ENCOUNTER (EMERGENCY)
Facility: MEDICAL CENTER | Age: 34
End: 2024-05-26
Attending: EMERGENCY MEDICINE
Payer: COMMERCIAL

## 2024-05-26 ENCOUNTER — APPOINTMENT (OUTPATIENT)
Dept: RADIOLOGY | Facility: MEDICAL CENTER | Age: 34
End: 2024-05-26
Attending: EMERGENCY MEDICINE
Payer: COMMERCIAL

## 2024-05-26 VITALS
WEIGHT: 154.32 LBS | TEMPERATURE: 98.5 F | SYSTOLIC BLOOD PRESSURE: 94 MMHG | BODY MASS INDEX: 26.35 KG/M2 | RESPIRATION RATE: 18 BRPM | HEIGHT: 64 IN | DIASTOLIC BLOOD PRESSURE: 64 MMHG | HEART RATE: 74 BPM | OXYGEN SATURATION: 97 %

## 2024-05-26 DIAGNOSIS — R10.2 PELVIC PAIN: Primary | ICD-10-CM

## 2024-05-26 DIAGNOSIS — N89.8 VAGINAL DISCHARGE: ICD-10-CM

## 2024-05-26 DIAGNOSIS — N83.201 CYST OF RIGHT OVARY: ICD-10-CM

## 2024-05-26 LAB
APPEARANCE UR: CLEAR
BACTERIA GENITAL QL WET PREP: NORMAL
BILIRUB UR QL STRIP.AUTO: NEGATIVE
COLOR UR: YELLOW
GLUCOSE UR STRIP.AUTO-MCNC: NEGATIVE MG/DL
HCG UR QL: NEGATIVE
KETONES UR STRIP.AUTO-MCNC: ABNORMAL MG/DL
LEUKOCYTE ESTERASE UR QL STRIP.AUTO: NEGATIVE
MICRO URNS: ABNORMAL
NITRITE UR QL STRIP.AUTO: NEGATIVE
PH UR STRIP.AUTO: 7 [PH] (ref 5–8)
PROT UR QL STRIP: NEGATIVE MG/DL
RBC UR QL AUTO: NEGATIVE
SIGNIFICANT IND 70042: NORMAL
SITE SITE: NORMAL
SOURCE SOURCE: NORMAL
SP GR UR STRIP.AUTO: 1.02

## 2024-05-26 RX ORDER — ACETAMINOPHEN 500 MG
1000 TABLET ORAL ONCE
Status: COMPLETED | OUTPATIENT
Start: 2024-05-26 | End: 2024-05-26

## 2024-05-26 RX ORDER — ACETAMINOPHEN 500 MG
1000 TABLET ORAL EVERY 6 HOURS PRN
Qty: 20 TABLET | Refills: 0 | Status: SHIPPED | OUTPATIENT
Start: 2024-05-26 | End: 2024-05-30

## 2024-05-26 RX ORDER — IBUPROFEN 800 MG/1
800 TABLET ORAL EVERY 8 HOURS PRN
Qty: 9 TABLET | Refills: 0 | Status: SHIPPED | OUTPATIENT
Start: 2024-05-26 | End: 2024-05-29

## 2024-05-26 RX ADMIN — ACETAMINOPHEN 1000 MG: 500 TABLET, FILM COATED ORAL at 18:30

## 2024-05-26 RX ADMIN — IBUPROFEN 800 MG: 200 TABLET, FILM COATED ORAL at 18:30

## 2024-05-26 ASSESSMENT — FIBROSIS 4 INDEX: FIB4 SCORE: 0.45

## 2024-05-26 NOTE — ED TRIAGE NOTES
"Chief Complaint   Patient presents with    Pelvic Pain     For 3-4 days, some blood tinged discharge. Denies N/V/D or fevers     /65   Pulse 78   Temp 36.9 °C (98.5 °F) (Temporal)   Resp 18   Ht 1.626 m (5' 4\")   Wt 70 kg (154 lb 5.2 oz)   SpO2 98%   BMI 26.49 kg/m²     "

## 2024-05-27 NOTE — DISCHARGE INSTRUCTIONS
Thankfully her workup here is normal.  Her vital signs are normal.  I am sending prescriptions for Tylenol and Motrin and I want to follow-up with OB/GYN for further evaluation and treatment.  Your gonorrhea and Chlamydia test will be back in 48 hours you can check on [facility] for the results.  If either of these are positive you need to go to an urgent care, PCP or return to the ED for antibiotic treatment.  Thank you for coming in today.

## 2024-05-27 NOTE — ED PROVIDER NOTES
ED Provider Note    Scribed for Farshad España by Farshad España. 2024  5:01 PM    Primary care provider: Candice Segura P.A.-C.  Means of arrival: private vehicle   History obtained from: Patient  History limited by: None    CHIEF COMPLAINT  Chief Complaint   Patient presents with    Pelvic Pain     For 3-4 days, some blood tinged discharge. Denies N/V/D or fevers     EXTERNAL RECORDS REVIEWED  Outpatient Notes patient follows up with the [facility] for a subacromial impingement of the left shoulder, last seen in January of this year    HPI/ROS  LIMITATION TO HISTORY   Select: : None  OUTSIDE HISTORIAN(S):  Partner bedside helps provide collateral formation regarding patient's presentation    HPI  Dee Cornejo is a 33 y.o. female who presents to the Emergency Department who is healthy, takes no medications, presenting with 3 to 4 days of some lower abdominal pelvic pain as well as some scant white discharge for the last 3 to 4 days.  Last menstrual cycle was the beginning of the month.  She is currently off birth control as her and her  are attempting pregnancy.  She has 2 living children, 1 prior  remotely.  No dysuria or hematuria.  No constipation or diarrhea.  No nausea or vomiting.  She is otherwise asymptomatic.  She is monogamous with 1 partner.    REVIEW OF SYSTEMS  As above, all other systems reviewed and are negative.   See HPI for further details.     PAST MEDICAL HISTORY   has a past medical history of COVID-19 (, 22), Morbid obesity (HCC) (2022), Pain (2022), PCOS (polycystic ovarian syndrome) (2022), and UTI (lower urinary tract infection).  SURGICAL HISTORY   has a past surgical history that includes lap,cholecystectomy (); cholecystectomy (); cholecystectomy; other (); lap, jesu restrict proc, longitudinal gas* (N/A, 2022); shldr arthroscop part debride 1-2 (Left, 2023); shldr arthroscop,part  "acromioplas (Left, 11/14/2023); and repair biceps long tendon (Left, 11/14/2023).  SOCIAL HISTORY  Social History     Tobacco Use    Smoking status: Former     Current packs/day: 0.00     Types: Cigarettes     Quit date: 2009     Years since quitting: 15.4    Smokeless tobacco: Never    Tobacco comments:     1 yr   Vaping Use    Vaping status: Never Used   Substance Use Topics    Alcohol use: Not Currently    Drug use: Yes     Types: Marijuana     Comment: cbd gtts intermittently      Social History     Substance and Sexual Activity   Drug Use Yes    Types: Marijuana    Comment: cbd gtts intermittently     FAMILY HISTORY  Family History   Problem Relation Age of Onset    Cancer Maternal Aunt         stomach    Arthritis Mother     Heart Disease Maternal Grandfather     Stroke Maternal Grandfather      CURRENT MEDICATIONS  Home Medications    **Home medications have not yet been reviewed for this encounter**       Audit from Redirected Encounters    **Home medications have not yet been reviewed for this encounter**       ALLERGIES  Allergies   Allergen Reactions    Famotidine Hives and Rash    Omeprazole Hives and Rash    Azithromycin      Patient reports bad stomach pain       PHYSICAL EXAM    VITAL SIGNS:   Vitals:    05/26/24 1630   BP: 100/65   Pulse: 78   Resp: 18   Temp: 36.9 °C (98.5 °F)   TempSrc: Temporal   SpO2: 98%   Weight: 70 kg (154 lb 5.2 oz)   Height: 1.626 m (5' 4\")     Vitals: My interpretation: normotensive, not tachycardic, afebrile, not hypoxic    Reinterpretation of vitals: Change unremarkable    PE:   Gen: sitting comfortably, speaking clearly, appears in no acute distress   ENT: Mucous membranes moist, posterior pharynx clear, uvula midline, nares patent bilaterally   Neck: Supple, FROM  Pulmonary: Lungs are clear to auscultation bilaterally. No tachypnea  CV:  RRR, no murmur appreciated, pulses 2+ in both upper and lower extremities  Abdomen: soft, mildly tender over the bladder/ND; no " rebound/guarding  : no CVA or suprapubic tenderness   Neuro: A&Ox4 (person, place, time, situation), speech fluent, gait steady, no focal deficits appreciated  Skin: No rash or lesions.  No pallor or jaundice.  No cyanosis.  Warm and dry.   : Normal external female genitalia.  Vaginal canal unremarkable.  No lesions.  There is a scant amount of white viscous discharge at the os, os is closed, no cervical motion tenderness, bimanual with minimal discomfort, slightly more on the right than left.    DIAGNOSTIC STUDIES / PROCEDURES    LABS  Results for orders placed or performed during the hospital encounter of 05/26/24   URINALYSIS CULTURE, IF INDICATED    Specimen: Urine, Clean Catch   Result Value Ref Range    Color Yellow     Character Clear     Specific Gravity 1.020 <1.035    Ph 7.0 5.0 - 8.0    Glucose Negative Negative mg/dL    Ketones Trace (A) Negative mg/dL    Protein Negative Negative mg/dL    Bilirubin Negative Negative    Nitrite Negative Negative    Leukocyte Esterase Negative Negative    Occult Blood Negative Negative    Micro Urine Req see below    WET PREP    Specimen: Vaginal; Genital   Result Value Ref Range    Significant Indicator NEG     Source GEN     Site VAGINAL     Wet Prep For Parasites       Moderate WBCs seen.  No yeast.  No motile Trichomonas seen.  No clue cells seen.     Chlamydia/GC, PCR (Urine)    Specimen: Urine   Result Value Ref Range    Source Urine    HCG QUALITATIVE UR   Result Value Ref Range    Beta-Hcg Urine Negative Negative   Chlamydia/GC, PCR (Urine)   Result Value Ref Range    Source Vaginal       All labs reviewed by me. Labs were compared to prior labs if they were available. Significant for urinalysis negative for infection or blood, wet prep negative for yeast, trichomonas or clue cells, previous test negative.  Gonorrhea and Chlamydia pending.    RADIOLOGY  I have independently interpreted the diagnostic imaging associated with this visit and am waiting the final  reading from the radiologist.   My preliminary interpretation is a follows: none  Radiologist interpretation is as follows:  US-PELVIC COMPLETE (TRANSABDOMINAL/TRANSVAGINAL) (COMBO)   Final Result      1.  Simple RIGHT ovarian cyst measuring 3.9 cm.   2.  Pelvic ultrasound otherwise unremarkable.          COURSE & MEDICAL DECISION MAKING  Nursing notes, VS, PMSFHx, labs, imaging, EKG reviewed in chart.    Ddx: PID, UTI, STD, yeast infection, kidney stone, ectopic pregnancy, ovarian cyst    MDM: 5:01 PM Dee Cornejo is a 33 y.o. female who presented with evaluation for 3 to 4 days of pelvic discomfort with lower abdominal pain and some scant vaginal discharge is white, without a smell.  No history of STDs.  She is monogamous with 1 sexual partner.  She has no medical history, takes no medications.  Denies drugs alcohol or tobacco.  Pain is worse on the right lower quadrant.  She is currently off birth control as her  are attempting to get pregnant.  She has 2 living children at home, 1 prior .  Denies fevers, nausea or vomiting.  Vital signs are unremarkable upon arrival here.  Her physical exam shows some minimal tenderness over the bladder and mild tenderness of the right lower quadrant.   Ordered urinalysis, pregnancy test, wet prep, gonorrhea chlamydia, and pelvic ultrasound.  exam shows normal appearing external female genitalia.  Vaginal canal unremarkable.  No lesions.  There is a scant amount of white viscous discharge at the os, os is closed, no cervical motion tenderness, bimanual with minimal discomfort, slightly more on the right than left. Swabs sent for analysis.  Pelvic ultrasound shows a simple right ovarian cyst measuring 3.9.  Pregnancy test negative.  Otherwise unremarkable pelvic ultrasound.  Urinalysis without signs of infection. All labs reviewed by me. Labs were compared to prior labs if they were available. Significant for urinalysis negative for infection or  blood, wet prep negative for yeast, trichomonas or clue cells, previous test negative.  Gonorrhea and Chlamydia pending.  I asked the  to leave the room momentarily had a long shared decision-making one-on-one with the patient about if she had any concerns at all for gonorrhea chlamydia.  She has none currently and both her and her partner are monogamous.  She states she was also checked in March and was negative at that time.  We discussed empiric treatment versus wait-and-see approach regarding the results at this time she is low suspicion and thus would prefer to wait and see what the results are MyChart in 2 days.  Her pain improved here with Tylenol Motrin which were given for likely ovarian cyst related pain on the right concerning this is where the patient's minimally tender at.  Overall she is appropriate for discharge home and outpatient follow-up.  She verbalized understand strict return precautions outpatient follow-up plan and is amenable.  A referral was placed in [facility] for the patient to see OB/GYN.    ADDITIONAL PROBLEM LIST AND DISPOSITION    I have discussed management of the patient with the following physicians and ANTONINO's: None    Discussion of management with other QHP or appropriate source(s): None     Escalation of care considered, and ultimately not performed:acute inpatient care management, however at this time, the patient is most appropriate for outpatient management    Barriers to care at this time, including but not limited to:  None .     Decision tools and prescription drugs considered including, but not limited to:  Tylenol Motrin prescriptions .    FINAL IMPRESSION  1. Pelvic pain Acute   2. Vaginal discharge Acute   3. Cyst of right ovary Acute      The note accurately reflects work and decisions made by me.  Farshad España  5/26/2024  5:01 PM

## 2024-05-27 NOTE — ED NOTES
This RN present as female chaperone for pelvic exam completed by Dr España. Wet prep swabs collected by ERP and sent to lab. Pt tolerated exam well w/o any c/o increased vaginal discomfort.

## 2024-05-28 LAB
C TRACH DNA GENITAL QL NAA+PROBE: NEGATIVE
N GONORRHOEA DNA GENITAL QL NAA+PROBE: NEGATIVE
SPECIMEN SOURCE: NORMAL

## 2024-08-01 ENCOUNTER — HOSPITAL ENCOUNTER (OUTPATIENT)
Facility: MEDICAL CENTER | Age: 34
End: 2024-08-01
Attending: CLINICAL NURSE SPECIALIST
Payer: COMMERCIAL

## 2024-08-01 LAB
25(OH)D3 SERPL-MCNC: 19 NG/ML (ref 30–100)
ALBUMIN SERPL BCP-MCNC: 4.1 G/DL (ref 3.2–4.9)
ALBUMIN/GLOB SERPL: 1.4 G/DL
ALP SERPL-CCNC: 65 U/L (ref 30–99)
ALT SERPL-CCNC: 12 U/L (ref 2–50)
ANION GAP SERPL CALC-SCNC: 12 MMOL/L (ref 7–16)
AST SERPL-CCNC: 16 U/L (ref 12–45)
BASOPHILS # BLD AUTO: 0.5 % (ref 0–1.8)
BASOPHILS # BLD: 0.03 K/UL (ref 0–0.12)
BILIRUB SERPL-MCNC: 0.4 MG/DL (ref 0.1–1.5)
BUN SERPL-MCNC: 10 MG/DL (ref 8–22)
CALCIUM ALBUM COR SERPL-MCNC: 9.3 MG/DL (ref 8.5–10.5)
CALCIUM SERPL-MCNC: 9.4 MG/DL (ref 8.5–10.5)
CHLORIDE SERPL-SCNC: 103 MMOL/L (ref 96–112)
CHOLEST SERPL-MCNC: 286 MG/DL (ref 100–199)
CO2 SERPL-SCNC: 23 MMOL/L (ref 20–33)
CREAT SERPL-MCNC: 0.61 MG/DL (ref 0.5–1.4)
EOSINOPHIL # BLD AUTO: 0.05 K/UL (ref 0–0.51)
EOSINOPHIL NFR BLD: 0.9 % (ref 0–6.9)
ERYTHROCYTE [DISTWIDTH] IN BLOOD BY AUTOMATED COUNT: 45.8 FL (ref 35.9–50)
FERRITIN SERPL-MCNC: 6.3 NG/ML (ref 10–291)
FOLATE SERPL-MCNC: 12 NG/ML
GFR SERPLBLD CREATININE-BSD FMLA CKD-EPI: 120 ML/MIN/1.73 M 2
GLOBULIN SER CALC-MCNC: 2.9 G/DL (ref 1.9–3.5)
GLUCOSE SERPL-MCNC: 73 MG/DL (ref 65–99)
HCT VFR BLD AUTO: 37.2 % (ref 37–47)
HDLC SERPL-MCNC: 97 MG/DL
HGB BLD-MCNC: 12.4 G/DL (ref 12–16)
IMM GRANULOCYTES # BLD AUTO: 0.02 K/UL (ref 0–0.11)
IMM GRANULOCYTES NFR BLD AUTO: 0.4 % (ref 0–0.9)
IRON SATN MFR SERPL: 17 % (ref 15–55)
IRON SERPL-MCNC: 74 UG/DL (ref 40–170)
LDLC SERPL CALC-MCNC: 167 MG/DL
LYMPHOCYTES # BLD AUTO: 2.05 K/UL (ref 1–4.8)
LYMPHOCYTES NFR BLD: 37.3 % (ref 22–41)
MCH RBC QN AUTO: 30.5 PG (ref 27–33)
MCHC RBC AUTO-ENTMCNC: 33.3 G/DL (ref 32.2–35.5)
MCV RBC AUTO: 91.6 FL (ref 81.4–97.8)
MONOCYTES # BLD AUTO: 0.63 K/UL (ref 0–0.85)
MONOCYTES NFR BLD AUTO: 11.5 % (ref 0–13.4)
NEUTROPHILS # BLD AUTO: 2.71 K/UL (ref 1.82–7.42)
NEUTROPHILS NFR BLD: 49.4 % (ref 44–72)
NRBC # BLD AUTO: 0 K/UL
NRBC BLD-RTO: 0 /100 WBC (ref 0–0.2)
PLATELET # BLD AUTO: 279 K/UL (ref 164–446)
PMV BLD AUTO: 9.6 FL (ref 9–12.9)
POTASSIUM SERPL-SCNC: 3.9 MMOL/L (ref 3.6–5.5)
PROT SERPL-MCNC: 7 G/DL (ref 6–8.2)
RBC # BLD AUTO: 4.06 M/UL (ref 4.2–5.4)
SODIUM SERPL-SCNC: 138 MMOL/L (ref 135–145)
TIBC SERPL-MCNC: 432 UG/DL (ref 250–450)
TRIGL SERPL-MCNC: 112 MG/DL (ref 0–149)
UIBC SERPL-MCNC: 358 UG/DL (ref 110–370)
VIT B12 SERPL-MCNC: 445 PG/ML (ref 211–911)
WBC # BLD AUTO: 5.5 K/UL (ref 4.8–10.8)

## 2024-08-01 PROCEDURE — 82607 VITAMIN B-12: CPT

## 2024-08-01 PROCEDURE — 80061 LIPID PANEL: CPT

## 2024-08-01 PROCEDURE — 82306 VITAMIN D 25 HYDROXY: CPT

## 2024-08-01 PROCEDURE — 80053 COMPREHEN METABOLIC PANEL: CPT

## 2024-08-01 PROCEDURE — 82746 ASSAY OF FOLIC ACID SERUM: CPT

## 2024-08-01 PROCEDURE — 83550 IRON BINDING TEST: CPT

## 2024-08-01 PROCEDURE — 82728 ASSAY OF FERRITIN: CPT

## 2024-08-01 PROCEDURE — 84425 ASSAY OF VITAMIN B-1: CPT

## 2024-08-01 PROCEDURE — 83540 ASSAY OF IRON: CPT

## 2024-08-01 PROCEDURE — 85025 COMPLETE CBC W/AUTO DIFF WBC: CPT

## 2024-08-07 LAB — VIT B1 BLD-MCNC: 104 NMOL/L (ref 70–180)

## 2024-10-18 ENCOUNTER — OFFICE VISIT (OUTPATIENT)
Dept: URGENT CARE | Facility: PHYSICIAN GROUP | Age: 34
End: 2024-10-18
Payer: COMMERCIAL

## 2024-10-18 VITALS
OXYGEN SATURATION: 98 % | DIASTOLIC BLOOD PRESSURE: 64 MMHG | BODY MASS INDEX: 27.14 KG/M2 | WEIGHT: 158.95 LBS | SYSTOLIC BLOOD PRESSURE: 108 MMHG | HEIGHT: 64 IN | RESPIRATION RATE: 18 BRPM | HEART RATE: 82 BPM | TEMPERATURE: 98.8 F

## 2024-10-18 DIAGNOSIS — R39.9 UTI SYMPTOMS: ICD-10-CM

## 2024-10-18 DIAGNOSIS — N30.01 ACUTE CYSTITIS WITH HEMATURIA: ICD-10-CM

## 2024-10-18 LAB
APPEARANCE UR: ABNORMAL
BILIRUB UR STRIP-MCNC: NEGATIVE MG/DL
COLOR UR AUTO: ABNORMAL
GLUCOSE UR STRIP.AUTO-MCNC: NEGATIVE MG/DL
KETONES UR STRIP.AUTO-MCNC: NEGATIVE MG/DL
LEUKOCYTE ESTERASE UR QL STRIP.AUTO: NEGATIVE
NITRITE UR QL STRIP.AUTO: POSITIVE
PH UR STRIP.AUTO: 8.5 [PH] (ref 5–8)
PROT UR QL STRIP: NEGATIVE MG/DL
RBC UR QL AUTO: ABNORMAL
SP GR UR STRIP.AUTO: 1.02
UROBILINOGEN UR STRIP-MCNC: ABNORMAL MG/DL

## 2024-10-18 PROCEDURE — 3078F DIAST BP <80 MM HG: CPT

## 2024-10-18 PROCEDURE — 99213 OFFICE O/P EST LOW 20 MIN: CPT

## 2024-10-18 PROCEDURE — 3074F SYST BP LT 130 MM HG: CPT

## 2024-10-18 PROCEDURE — 81002 URINALYSIS NONAUTO W/O SCOPE: CPT

## 2024-10-18 RX ORDER — NITROFURANTOIN 25; 75 MG/1; MG/1
100 CAPSULE ORAL EVERY 12 HOURS
Qty: 10 CAPSULE | Refills: 0 | Status: SHIPPED | OUTPATIENT
Start: 2024-10-18 | End: 2024-10-23

## 2024-10-18 ASSESSMENT — ENCOUNTER SYMPTOMS
NAUSEA: 0
VOMITING: 0
FLANK PAIN: 0
ABDOMINAL PAIN: 1
FEVER: 0

## 2024-10-18 ASSESSMENT — FIBROSIS 4 INDEX: FIB4 SCORE: 0.58

## 2024-10-28 ENCOUNTER — PHARMACY VISIT (OUTPATIENT)
Dept: PHARMACY | Facility: MEDICAL CENTER | Age: 34
End: 2024-10-28
Payer: COMMERCIAL

## 2024-10-28 PROCEDURE — RXMED WILLOW AMBULATORY MEDICATION CHARGE: Performed by: INTERNAL MEDICINE

## 2024-10-28 RX ORDER — INFLUENZA A VIRUS A/VICTORIA/4897/2022 IVR-238 (H1N1) ANTIGEN (FORMALDEHYDE INACTIVATED), INFLUENZA A VIRUS A/CALIFORNIA/122/2022 SAN-022 (H3N2) ANTIGEN (FORMALDEHYDE INACTIVATED), AND INFLUENZA B VIRUS B/MICHIGAN/01/2021 ANTIGEN (FORMALDEHYDE INACTIVATED) 15; 15; 15 MG/.5ML; MG/.5ML; MG/.5ML
INJECTION, SUSPENSION INTRAMUSCULAR
Qty: 0.5 ML | Refills: 0 | OUTPATIENT
Start: 2024-10-28

## 2024-11-29 PROCEDURE — 99284 EMERGENCY DEPT VISIT MOD MDM: CPT

## 2024-11-30 ENCOUNTER — HOSPITAL ENCOUNTER (EMERGENCY)
Facility: MEDICAL CENTER | Age: 34
End: 2024-11-30
Attending: EMERGENCY MEDICINE
Payer: COMMERCIAL

## 2024-11-30 ENCOUNTER — APPOINTMENT (OUTPATIENT)
Dept: RADIOLOGY | Facility: MEDICAL CENTER | Age: 34
End: 2024-11-30
Attending: EMERGENCY MEDICINE
Payer: COMMERCIAL

## 2024-11-30 VITALS
TEMPERATURE: 98.2 F | RESPIRATION RATE: 18 BRPM | SYSTOLIC BLOOD PRESSURE: 119 MMHG | HEART RATE: 81 BPM | HEIGHT: 64 IN | OXYGEN SATURATION: 100 % | DIASTOLIC BLOOD PRESSURE: 70 MMHG | BODY MASS INDEX: 27.78 KG/M2 | WEIGHT: 162.7 LBS

## 2024-11-30 DIAGNOSIS — O20.8 SUBCHORIONIC HEMORRHAGE IN FIRST TRIMESTER: Primary | ICD-10-CM

## 2024-11-30 LAB
ALBUMIN SERPL BCP-MCNC: 4 G/DL (ref 3.2–4.9)
ALBUMIN/GLOB SERPL: 1.5 G/DL
ALP SERPL-CCNC: 55 U/L (ref 30–99)
ALT SERPL-CCNC: 7 U/L (ref 2–50)
ANION GAP SERPL CALC-SCNC: 10 MMOL/L (ref 7–16)
AST SERPL-CCNC: 16 U/L (ref 12–45)
B-HCG SERPL-ACNC: ABNORMAL MIU/ML (ref 0–5)
BASOPHILS # BLD AUTO: 0.6 % (ref 0–1.8)
BASOPHILS # BLD: 0.04 K/UL (ref 0–0.12)
BILIRUB SERPL-MCNC: 0.3 MG/DL (ref 0.1–1.5)
BUN SERPL-MCNC: 9 MG/DL (ref 8–22)
CALCIUM ALBUM COR SERPL-MCNC: 9.2 MG/DL (ref 8.5–10.5)
CALCIUM SERPL-MCNC: 9.2 MG/DL (ref 8.5–10.5)
CHLORIDE SERPL-SCNC: 103 MMOL/L (ref 96–112)
CO2 SERPL-SCNC: 21 MMOL/L (ref 20–33)
CREAT SERPL-MCNC: 0.51 MG/DL (ref 0.5–1.4)
EOSINOPHIL # BLD AUTO: 0.04 K/UL (ref 0–0.51)
EOSINOPHIL NFR BLD: 0.6 % (ref 0–6.9)
ERYTHROCYTE [DISTWIDTH] IN BLOOD BY AUTOMATED COUNT: 45.7 FL (ref 35.9–50)
GFR SERPLBLD CREATININE-BSD FMLA CKD-EPI: 125 ML/MIN/1.73 M 2
GLOBULIN SER CALC-MCNC: 2.7 G/DL (ref 1.9–3.5)
GLUCOSE SERPL-MCNC: 85 MG/DL (ref 65–99)
HCT VFR BLD AUTO: 33 % (ref 37–47)
HGB BLD-MCNC: 11 G/DL (ref 12–16)
IMM GRANULOCYTES # BLD AUTO: 0.03 K/UL (ref 0–0.11)
IMM GRANULOCYTES NFR BLD AUTO: 0.4 % (ref 0–0.9)
LIPASE SERPL-CCNC: 17 U/L (ref 11–82)
LYMPHOCYTES # BLD AUTO: 2.27 K/UL (ref 1–4.8)
LYMPHOCYTES NFR BLD: 33.2 % (ref 22–41)
MCH RBC QN AUTO: 28.7 PG (ref 27–33)
MCHC RBC AUTO-ENTMCNC: 33.3 G/DL (ref 32.2–35.5)
MCV RBC AUTO: 86.2 FL (ref 81.4–97.8)
MONOCYTES # BLD AUTO: 0.57 K/UL (ref 0–0.85)
MONOCYTES NFR BLD AUTO: 8.3 % (ref 0–13.4)
NEUTROPHILS # BLD AUTO: 3.89 K/UL (ref 1.82–7.42)
NEUTROPHILS NFR BLD: 56.9 % (ref 44–72)
NRBC # BLD AUTO: 0 K/UL
NRBC BLD-RTO: 0 /100 WBC (ref 0–0.2)
NUMBER OF RH DOSES IND 8505RD: NORMAL
PLATELET # BLD AUTO: 278 K/UL (ref 164–446)
PMV BLD AUTO: 9.2 FL (ref 9–12.9)
POTASSIUM SERPL-SCNC: 3.5 MMOL/L (ref 3.6–5.5)
PROT SERPL-MCNC: 6.7 G/DL (ref 6–8.2)
RBC # BLD AUTO: 3.83 M/UL (ref 4.2–5.4)
RH BLD: NORMAL
SODIUM SERPL-SCNC: 134 MMOL/L (ref 135–145)
WBC # BLD AUTO: 6.8 K/UL (ref 4.8–10.8)

## 2024-11-30 PROCEDURE — 80053 COMPREHEN METABOLIC PANEL: CPT

## 2024-11-30 PROCEDURE — 36415 COLL VENOUS BLD VENIPUNCTURE: CPT

## 2024-11-30 PROCEDURE — 85025 COMPLETE CBC W/AUTO DIFF WBC: CPT

## 2024-11-30 PROCEDURE — 99284 EMERGENCY DEPT VISIT MOD MDM: CPT

## 2024-11-30 PROCEDURE — 84702 CHORIONIC GONADOTROPIN TEST: CPT

## 2024-11-30 PROCEDURE — 86901 BLOOD TYPING SEROLOGIC RH(D): CPT

## 2024-11-30 PROCEDURE — 76801 OB US < 14 WKS SINGLE FETUS: CPT

## 2024-11-30 PROCEDURE — 83690 ASSAY OF LIPASE: CPT

## 2024-11-30 ASSESSMENT — PAIN DESCRIPTION - PAIN TYPE: TYPE: ACUTE PAIN

## 2024-11-30 ASSESSMENT — FIBROSIS 4 INDEX: FIB4 SCORE: 0.58

## 2024-11-30 NOTE — ED NOTES
Pt discharged . GCS 15.  pt in possession of belongings. Pt provided discharge education and information pertaining to medications and follow up appointments. Pt received copy of discharge instructions and verbalized understanding.     Vitals:    11/30/24 0317   BP: 119/70   Pulse: 81   Resp: 18   Temp: 36.8 °C (98.2 °F)   SpO2: 100%
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
## 2024-11-30 NOTE — ED TRIAGE NOTES
Chief Complaint   Patient presents with    Cramping    Vaginal Bleeding     Pt reports lower pelvic pain, cramping, and vaginal bleeding that started tonight. -clots Pt is approx 7-8wks pregnant.     Ambulatory to triage for above complaint with visitor. Pt reports lower pelvic pain and cramping that started tonight. States she went to the restroom and noticed blood while wiping. Denies seeing any clots. Pt states she took a home pregnancy test and had a blood test confirming pregnant. OB established, appoint is .  5, para 2    Pt placed in lobby and educated on triage process. Pt encouraged to alert staff for any changes.    Protocol ordered.     Patient and staff wearing appropriate PPE.

## 2024-12-27 ENCOUNTER — OFFICE VISIT (OUTPATIENT)
Dept: URGENT CARE | Facility: CLINIC | Age: 34
End: 2024-12-27
Payer: COMMERCIAL

## 2024-12-27 VITALS
HEIGHT: 64 IN | BODY MASS INDEX: 28.34 KG/M2 | TEMPERATURE: 98.5 F | DIASTOLIC BLOOD PRESSURE: 74 MMHG | HEART RATE: 90 BPM | RESPIRATION RATE: 18 BRPM | WEIGHT: 166 LBS | SYSTOLIC BLOOD PRESSURE: 110 MMHG | OXYGEN SATURATION: 100 %

## 2024-12-27 DIAGNOSIS — J10.1 INFLUENZA A: ICD-10-CM

## 2024-12-27 LAB
FLUAV RNA SPEC QL NAA+PROBE: POSITIVE
FLUBV RNA SPEC QL NAA+PROBE: NEGATIVE
RSV RNA SPEC QL NAA+PROBE: NEGATIVE
SARS-COV-2 RNA RESP QL NAA+PROBE: NEGATIVE

## 2024-12-27 PROCEDURE — 99213 OFFICE O/P EST LOW 20 MIN: CPT

## 2024-12-27 PROCEDURE — 3074F SYST BP LT 130 MM HG: CPT

## 2024-12-27 PROCEDURE — 3078F DIAST BP <80 MM HG: CPT

## 2024-12-27 PROCEDURE — 0241U POCT CEPHEID COV-2, FLU A/B, RSV - PCR: CPT

## 2024-12-27 RX ORDER — OSELTAMIVIR PHOSPHATE 75 MG/1
75 CAPSULE ORAL 2 TIMES DAILY
Qty: 10 CAPSULE | Refills: 0 | Status: SHIPPED | OUTPATIENT
Start: 2024-12-27 | End: 2025-01-01

## 2024-12-27 ASSESSMENT — ENCOUNTER SYMPTOMS
FEVER: 1
SORE THROAT: 1
MYALGIAS: 1
HEADACHES: 1
COUGH: 0

## 2024-12-27 ASSESSMENT — FIBROSIS 4 INDEX: FIB4 SCORE: 0.76

## 2024-12-27 NOTE — PROGRESS NOTES
Subjective:     CHIEF COMPLAINT  Chief Complaint   Patient presents with    Fever      body aches, headache, runny nose, congested, itchy ears x2 days  Pt is 12 wks pregnant       HPI  Dee Schreiber is a very pleasant 34 y.o. female who presents with a fever, body aches, headaches, and itchy sore throat, itchy ears, and fatigue that started yesterday.  She was recently exposed to influenza A by her son.  Additionally, the patient is currently 12 weeks pregnant.  She has been taking Tylenol as needed.    REVIEW OF SYSTEMS  Review of Systems   Constitutional:  Positive for fever and malaise/fatigue.   HENT:  Positive for congestion, ear pain and sore throat.    Respiratory:  Negative for cough.    Musculoskeletal:  Positive for myalgias.   Neurological:  Positive for headaches.       PAST MEDICAL HISTORY  Patient Active Problem List    Diagnosis Date Noted    Subacromial impingement of left shoulder 10/20/2023    Food impaction of esophagus, initial encounter 09/01/2022    Nausea & vomiting 09/01/2022    Postoperative pain 05/24/2022    COVID-19 01/11/2022    Insomnia due to medical condition 01/11/2022       SURGICAL HISTORY   has a past surgical history that includes lap cholecystectomy (2011); cholecystectomy (2011); cholecystectomy; other (2017); lap jesu restrict proc longitudinal gas* (N/A, 5/24/2022); shldr arthroscop part debride 1-2 (Left, 11/14/2023); shldr arthroscop,part acromioplas (Left, 11/14/2023); and repair biceps long tendon (Left, 11/14/2023).    ALLERGIES  Allergies   Allergen Reactions    Famotidine Hives and Rash    Omeprazole Hives and Rash    Azithromycin      Patient reports bad stomach pain    Shrimp Flavor Swelling       CURRENT MEDICATIONS  Home Medications       Reviewed by Ambika Perez P.A.-C. (Physician Assistant) on 12/27/24 at 1033  Med List Status: <None>     Medication Last Dose Status   diazepam (VALIUM) 10 MG tablet Not Taking Active   DULoxetine (CYMBALTA) 30  "MG Cap DR Particles  Active   influenza vaccine (FLUZONE) 0.5 ML Suspension Prefilled Syringe injection Not Taking Active   lansoprazole (PREVACID) 30 MG CAPSULE DELAYED RELEASE Not Taking Active   lansoprazole (PREVACID) 30 MG CAPSULE DELAYED RELEASE Taking Active   LO LOESTRIN FE 1 MG-10 MCG / 10 MCG Tab  Active   therapeutic multivitamin-minerals (THERAGRAN-M) Tab Not Taking Active   XULANE 150-35 MCG/24HR patch  Active                    SOCIAL HISTORY  Social History     Tobacco Use    Smoking status: Former     Current packs/day: 0.00     Types: Cigarettes     Quit date: 2009     Years since quitting: 15.9    Smokeless tobacco: Never    Tobacco comments:     1 yr   Vaping Use    Vaping status: Never Used   Substance and Sexual Activity    Alcohol use: Not Currently    Drug use: Not Currently     Types: Marijuana     Comment: cbd gtts intermittently    Sexual activity: Yes       FAMILY HISTORY  Family History   Problem Relation Age of Onset    Cancer Maternal Aunt         stomach    Arthritis Mother     Heart Disease Maternal Grandfather     Stroke Maternal Grandfather           Objective:     VITAL SIGNS: /74   Pulse 90   Temp 36.9 °C (98.5 °F) (Temporal)   Resp 18   Ht 1.626 m (5' 4\")   Wt 75.3 kg (166 lb)   LMP 09/16/2024 (Approximate) Comment: approx 7-8weeks pregnant  SpO2 100%   BMI 28.49 kg/m²     PHYSICAL EXAM  Physical Exam  Vitals reviewed. Exam conducted with a chaperone present.   Constitutional:       General: She is not in acute distress.     Appearance: Normal appearance. She is ill-appearing. She is not toxic-appearing.   HENT:      Head: Normocephalic and atraumatic.      Right Ear: Tympanic membrane, ear canal and external ear normal.      Left Ear: Tympanic membrane, ear canal and external ear normal.      Nose: Congestion present.      Mouth/Throat:      Mouth: Mucous membranes are moist.      Pharynx: Oropharynx is clear. Posterior oropharyngeal erythema present. No " oropharyngeal exudate.   Eyes:      Conjunctiva/sclera: Conjunctivae normal.      Pupils: Pupils are equal, round, and reactive to light.   Cardiovascular:      Rate and Rhythm: Normal rate and regular rhythm.      Heart sounds: Normal heart sounds.   Pulmonary:      Effort: Pulmonary effort is normal. No respiratory distress.      Breath sounds: Normal breath sounds. No stridor. No wheezing, rhonchi or rales.   Skin:     General: Skin is warm and dry.      Coloration: Skin is not pale.   Neurological:      General: No focal deficit present.      Mental Status: She is alert and oriented to person, place, and time.   Psychiatric:         Mood and Affect: Mood normal.         Assessment/Plan:     1. Influenza A  - POCT CoV-2, Flu A/B, RSV by PCR  - oseltamivir (TAMIFLU) 75 MG Cap; Take 1 Capsule by mouth 2 times a day for 5 days.  Dispense: 10 Capsule; Refill: 0  -Tylenol OTC as needed for discomfort  -Rest and hydrate  -Return to clinic if symptoms worsen or fail to resolve    MDM/Comments:  Patient has stable vital signs and is non-toxic appearing.  Viral testing for COVID, influenza, and RSV performed in office with positive influenza A results. A prescription for Tamiflu has been sent to the patient's pharmacy.  Discussed supportive care with hydration, rest, Tylenol as needed. Patient demonstrated understanding of treatment plan at this time and will RTC if symptoms worsen or fail to resolve.     Differential diagnosis, natural history, supportive care, and indications for immediate follow-up discussed. All questions answered. Patient agrees with the plan of care.    Follow-up as needed if symptoms worsen or fail to improve to PCP, Urgent care or Emergency Room.    I have personally reviewed prior external notes and test results pertinent to today's visit.  I have independently reviewed and interpreted all diagnostics ordered during this urgent care acute visit.   Discussed management options (risks,benefits, and  alternatives to treatment). Pt expresses understanding and the treatment plan was agreed upon. Questions were encouraged and answered to pt's satisfaction.    Please note that this dictation was created using voice recognition software. I have made a reasonable attempt to correct obvious errors, but I expect that there are errors of grammar and possibly content that I did not discover before finalizing the note.

## 2025-02-26 ENCOUNTER — HOSPITAL ENCOUNTER (OUTPATIENT)
Dept: LAB | Facility: MEDICAL CENTER | Age: 35
End: 2025-02-26
Attending: OBSTETRICS & GYNECOLOGY
Payer: COMMERCIAL

## 2025-02-26 PROCEDURE — 36415 COLL VENOUS BLD VENIPUNCTURE: CPT

## 2025-02-26 PROCEDURE — 82105 ALPHA-FETOPROTEIN SERUM: CPT

## 2025-03-03 LAB
# FETUSES US: NORMAL
AFP MOM SERPL: 1.06
AFP SERPL-MCNC: 67 NG/ML
AGE - REPORTED: 35.9 YR
CURRENT SMOKER: NORMAL
FAMILY MEMBER DISEASES HX: NO
GA METHOD: NORMAL
GA: NORMAL WK
IDDM PATIENT QL: NO
INTEGRATED SCN PATIENT-IMP: NORMAL
SPECIMEN DRAWN SERPL: NORMAL

## 2025-03-10 ENCOUNTER — HOSPITAL ENCOUNTER (OUTPATIENT)
Facility: MEDICAL CENTER | Age: 35
End: 2025-03-10
Payer: COMMERCIAL

## 2025-04-16 ENCOUNTER — HOSPITAL ENCOUNTER (OUTPATIENT)
Dept: LAB | Facility: MEDICAL CENTER | Age: 35
End: 2025-04-16
Attending: OBSTETRICS & GYNECOLOGY
Payer: COMMERCIAL

## 2025-04-16 LAB
ALBUMIN SERPL BCP-MCNC: 3.8 G/DL (ref 3.2–4.9)
ALBUMIN/GLOB SERPL: 1.2 G/DL
ALP SERPL-CCNC: 87 U/L (ref 30–99)
ALT SERPL-CCNC: 8 U/L (ref 2–50)
ANION GAP SERPL CALC-SCNC: 13 MMOL/L (ref 7–16)
AST SERPL-CCNC: 18 U/L (ref 12–45)
BASOPHILS # BLD AUTO: 0.4 % (ref 0–1.8)
BASOPHILS # BLD: 0.04 K/UL (ref 0–0.12)
BILIRUB SERPL-MCNC: 0.3 MG/DL (ref 0.1–1.5)
BUN SERPL-MCNC: 7 MG/DL (ref 8–22)
CALCIUM ALBUM COR SERPL-MCNC: 9.4 MG/DL (ref 8.5–10.5)
CALCIUM SERPL-MCNC: 9.2 MG/DL (ref 8.5–10.5)
CHLORIDE SERPL-SCNC: 103 MMOL/L (ref 96–112)
CO2 SERPL-SCNC: 21 MMOL/L (ref 20–33)
COMMENT 1642: NORMAL
CREAT SERPL-MCNC: 0.48 MG/DL (ref 0.5–1.4)
EOSINOPHIL # BLD AUTO: 0.07 K/UL (ref 0–0.51)
EOSINOPHIL NFR BLD: 0.8 % (ref 0–6.9)
ERYTHROCYTE [DISTWIDTH] IN BLOOD BY AUTOMATED COUNT: 46 FL (ref 35.9–50)
GFR SERPLBLD CREATININE-BSD FMLA CKD-EPI: 126 ML/MIN/1.73 M 2
GLOBULIN SER CALC-MCNC: 3.2 G/DL (ref 1.9–3.5)
GLUCOSE 1H P 50 G GLC PO SERPL-MCNC: 75 MG/DL (ref 70–139)
GLUCOSE SERPL-MCNC: 74 MG/DL (ref 65–99)
HCT VFR BLD AUTO: 30.4 % (ref 37–47)
HGB BLD-MCNC: 9.1 G/DL (ref 12–16)
IMM GRANULOCYTES # BLD AUTO: 0.08 K/UL (ref 0–0.11)
IMM GRANULOCYTES NFR BLD AUTO: 0.9 % (ref 0–0.9)
LYMPHOCYTES # BLD AUTO: 1.46 K/UL (ref 1–4.8)
LYMPHOCYTES NFR BLD: 15.8 % (ref 22–41)
MCH RBC QN AUTO: 24.9 PG (ref 27–33)
MCHC RBC AUTO-ENTMCNC: 29.9 G/DL (ref 32.2–35.5)
MCV RBC AUTO: 83.1 FL (ref 81.4–97.8)
MONOCYTES # BLD AUTO: 0.74 K/UL (ref 0–0.85)
MONOCYTES NFR BLD AUTO: 8 % (ref 0–13.4)
MORPHOLOGY BLD-IMP: NORMAL
NEUTROPHILS # BLD AUTO: 6.84 K/UL (ref 1.82–7.42)
NEUTROPHILS NFR BLD: 74.1 % (ref 44–72)
NRBC # BLD AUTO: 0 K/UL
NRBC BLD-RTO: 0 /100 WBC (ref 0–0.2)
PLATELET # BLD AUTO: 261 K/UL (ref 164–446)
PLATELET BLD QL SMEAR: NORMAL
PMV BLD AUTO: 10.6 FL (ref 9–12.9)
POTASSIUM SERPL-SCNC: 4.1 MMOL/L (ref 3.6–5.5)
PROT SERPL-MCNC: 7 G/DL (ref 6–8.2)
RBC # BLD AUTO: 3.66 M/UL (ref 4.2–5.4)
RBC BLD AUTO: NORMAL
SODIUM SERPL-SCNC: 137 MMOL/L (ref 135–145)
T PALLIDUM AB SER QL IA: NORMAL
WBC # BLD AUTO: 9.2 K/UL (ref 4.8–10.8)

## 2025-04-16 PROCEDURE — 85025 COMPLETE CBC W/AUTO DIFF WBC: CPT

## 2025-04-16 PROCEDURE — 82239 BILE ACIDS TOTAL: CPT

## 2025-04-16 PROCEDURE — 36415 COLL VENOUS BLD VENIPUNCTURE: CPT

## 2025-04-16 PROCEDURE — 86780 TREPONEMA PALLIDUM: CPT

## 2025-04-16 PROCEDURE — 80053 COMPREHEN METABOLIC PANEL: CPT

## 2025-04-16 PROCEDURE — 82950 GLUCOSE TEST: CPT

## 2025-04-18 LAB — BILE AC SERPL-SCNC: 9 UMOL/L (ref 0–10)

## 2025-04-21 SDOH — ECONOMIC STABILITY: INCOME INSECURITY: HOW HARD IS IT FOR YOU TO PAY FOR THE VERY BASICS LIKE FOOD, HOUSING, MEDICAL CARE, AND HEATING?: NOT VERY HARD

## 2025-04-21 SDOH — HEALTH STABILITY: PHYSICAL HEALTH: ON AVERAGE, HOW MANY DAYS PER WEEK DO YOU ENGAGE IN MODERATE TO STRENUOUS EXERCISE (LIKE A BRISK WALK)?: 7 DAYS

## 2025-04-21 SDOH — HEALTH STABILITY: PHYSICAL HEALTH: ON AVERAGE, HOW MANY MINUTES DO YOU ENGAGE IN EXERCISE AT THIS LEVEL?: 30 MIN

## 2025-04-21 SDOH — ECONOMIC STABILITY: FOOD INSECURITY: WITHIN THE PAST 12 MONTHS, YOU WORRIED THAT YOUR FOOD WOULD RUN OUT BEFORE YOU GOT MONEY TO BUY MORE.: NEVER TRUE

## 2025-04-21 SDOH — ECONOMIC STABILITY: INCOME INSECURITY: IN THE LAST 12 MONTHS, WAS THERE A TIME WHEN YOU WERE NOT ABLE TO PAY THE MORTGAGE OR RENT ON TIME?: NO

## 2025-04-21 SDOH — ECONOMIC STABILITY: FOOD INSECURITY: WITHIN THE PAST 12 MONTHS, THE FOOD YOU BOUGHT JUST DIDN'T LAST AND YOU DIDN'T HAVE MONEY TO GET MORE.: NEVER TRUE

## 2025-04-21 ASSESSMENT — SOCIAL DETERMINANTS OF HEALTH (SDOH)
HOW OFTEN DO YOU HAVE SIX OR MORE DRINKS ON ONE OCCASION: NEVER
HOW OFTEN DO YOU ATTENT MEETINGS OF THE CLUB OR ORGANIZATION YOU BELONG TO?: NEVER
HOW OFTEN DO YOU ATTEND CHURCH OR RELIGIOUS SERVICES?: 1 TO 4 TIMES PER YEAR
ARE YOU MARRIED, WIDOWED, DIVORCED, SEPARATED, NEVER MARRIED, OR LIVING WITH A PARTNER?: LIVING WITH PARTNER
HOW HARD IS IT FOR YOU TO PAY FOR THE VERY BASICS LIKE FOOD, HOUSING, MEDICAL CARE, AND HEATING?: NOT VERY HARD
HOW OFTEN DO YOU GET TOGETHER WITH FRIENDS OR RELATIVES?: TWICE A WEEK
HOW OFTEN DO YOU ATTENT MEETINGS OF THE CLUB OR ORGANIZATION YOU BELONG TO?: NEVER
IN A TYPICAL WEEK, HOW MANY TIMES DO YOU TALK ON THE PHONE WITH FAMILY, FRIENDS, OR NEIGHBORS?: MORE THAN THREE TIMES A WEEK
DO YOU BELONG TO ANY CLUBS OR ORGANIZATIONS SUCH AS CHURCH GROUPS UNIONS, FRATERNAL OR ATHLETIC GROUPS, OR SCHOOL GROUPS?: NO
DO YOU BELONG TO ANY CLUBS OR ORGANIZATIONS SUCH AS CHURCH GROUPS UNIONS, FRATERNAL OR ATHLETIC GROUPS, OR SCHOOL GROUPS?: NO
HOW OFTEN DO YOU HAVE A DRINK CONTAINING ALCOHOL: NEVER
IN A TYPICAL WEEK, HOW MANY TIMES DO YOU TALK ON THE PHONE WITH FAMILY, FRIENDS, OR NEIGHBORS?: MORE THAN THREE TIMES A WEEK
IN THE PAST 12 MONTHS, HAS THE ELECTRIC, GAS, OIL, OR WATER COMPANY THREATENED TO SHUT OFF SERVICE IN YOUR HOME?: NO
HOW OFTEN DO YOU GET TOGETHER WITH FRIENDS OR RELATIVES?: TWICE A WEEK
ARE YOU MARRIED, WIDOWED, DIVORCED, SEPARATED, NEVER MARRIED, OR LIVING WITH A PARTNER?: LIVING WITH PARTNER
HOW MANY DRINKS CONTAINING ALCOHOL DO YOU HAVE ON A TYPICAL DAY WHEN YOU ARE DRINKING: PATIENT DOES NOT DRINK
WITHIN THE PAST 12 MONTHS, YOU WORRIED THAT YOUR FOOD WOULD RUN OUT BEFORE YOU GOT THE MONEY TO BUY MORE: NEVER TRUE
HOW OFTEN DO YOU ATTEND CHURCH OR RELIGIOUS SERVICES?: 1 TO 4 TIMES PER YEAR

## 2025-04-21 ASSESSMENT — LIFESTYLE VARIABLES
HOW OFTEN DO YOU HAVE A DRINK CONTAINING ALCOHOL: NEVER
SKIP TO QUESTIONS 9-10: 1
HOW OFTEN DO YOU HAVE SIX OR MORE DRINKS ON ONE OCCASION: NEVER
HOW MANY STANDARD DRINKS CONTAINING ALCOHOL DO YOU HAVE ON A TYPICAL DAY: PATIENT DOES NOT DRINK
AUDIT-C TOTAL SCORE: 0

## 2025-04-24 ENCOUNTER — OFFICE VISIT (OUTPATIENT)
Dept: MEDICAL GROUP | Facility: MEDICAL CENTER | Age: 35
End: 2025-04-24
Payer: COMMERCIAL

## 2025-04-24 ENCOUNTER — HOSPITAL ENCOUNTER (OUTPATIENT)
Dept: LAB | Facility: MEDICAL CENTER | Age: 35
End: 2025-04-24
Attending: NURSE PRACTITIONER
Payer: COMMERCIAL

## 2025-04-24 VITALS
DIASTOLIC BLOOD PRESSURE: 58 MMHG | RESPIRATION RATE: 16 BRPM | WEIGHT: 186.95 LBS | HEART RATE: 110 BPM | TEMPERATURE: 98.4 F | OXYGEN SATURATION: 100 % | HEIGHT: 64 IN | BODY MASS INDEX: 31.92 KG/M2 | SYSTOLIC BLOOD PRESSURE: 106 MMHG

## 2025-04-24 DIAGNOSIS — K21.9 GASTROESOPHAGEAL REFLUX DISEASE WITHOUT ESOPHAGITIS: ICD-10-CM

## 2025-04-24 DIAGNOSIS — R53.83 OTHER FATIGUE: ICD-10-CM

## 2025-04-24 DIAGNOSIS — R09.81 NASAL CONGESTION: ICD-10-CM

## 2025-04-24 DIAGNOSIS — Z3A.28 28 WEEKS GESTATION OF PREGNANCY: ICD-10-CM

## 2025-04-24 DIAGNOSIS — E78.5 DYSLIPIDEMIA: ICD-10-CM

## 2025-04-24 DIAGNOSIS — D64.9 ANEMIA, UNSPECIFIED TYPE: ICD-10-CM

## 2025-04-24 DIAGNOSIS — D50.9 IRON DEFICIENCY ANEMIA DURING PREGNANCY: ICD-10-CM

## 2025-04-24 DIAGNOSIS — E55.9 VITAMIN D DEFICIENCY: ICD-10-CM

## 2025-04-24 DIAGNOSIS — K59.00 CONSTIPATION, UNSPECIFIED CONSTIPATION TYPE: ICD-10-CM

## 2025-04-24 DIAGNOSIS — O99.019 IRON DEFICIENCY ANEMIA DURING PREGNANCY: ICD-10-CM

## 2025-04-24 DIAGNOSIS — Z90.3 S/P GASTRIC SLEEVE PROCEDURE: ICD-10-CM

## 2025-04-24 DIAGNOSIS — R42 LIGHTHEADEDNESS: ICD-10-CM

## 2025-04-24 DIAGNOSIS — Z76.89 ENCOUNTER TO ESTABLISH CARE: ICD-10-CM

## 2025-04-24 PROBLEM — D64.89 OTHER SPECIFIED ANEMIAS: Status: ACTIVE | Noted: 2025-04-24

## 2025-04-24 PROBLEM — G47.01 INSOMNIA DUE TO MEDICAL CONDITION: Status: RESOLVED | Noted: 2022-01-11 | Resolved: 2025-04-24

## 2025-04-24 PROBLEM — G89.18 POSTOPERATIVE PAIN: Status: RESOLVED | Noted: 2022-05-24 | Resolved: 2025-04-24
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
## 2025-04-24 PROBLEM — T18.128A FOOD IMPACTION OF ESOPHAGUS, INITIAL ENCOUNTER: Status: RESOLVED | Noted: 2022-09-01 | Resolved: 2025-04-24

## 2025-04-24 PROBLEM — R11.2 NAUSEA & VOMITING: Status: RESOLVED | Noted: 2022-09-01 | Resolved: 2025-04-24

## 2025-04-24 PROBLEM — U07.1 COVID-19: Status: RESOLVED | Noted: 2022-01-11 | Resolved: 2025-04-24

## 2025-04-24 LAB
FERRITIN SERPL-MCNC: 8.5 NG/ML (ref 10–291)
IRON SATN MFR SERPL: 4 % (ref 15–55)
IRON SERPL-MCNC: 18 UG/DL (ref 40–170)
TIBC SERPL-MCNC: 480 UG/DL (ref 250–450)
UIBC SERPL-MCNC: 462 UG/DL (ref 110–370)

## 2025-04-24 PROCEDURE — 3074F SYST BP LT 130 MM HG: CPT | Performed by: NURSE PRACTITIONER

## 2025-04-24 PROCEDURE — 83540 ASSAY OF IRON: CPT

## 2025-04-24 PROCEDURE — 36415 COLL VENOUS BLD VENIPUNCTURE: CPT

## 2025-04-24 PROCEDURE — 99214 OFFICE O/P EST MOD 30 MIN: CPT | Performed by: NURSE PRACTITIONER

## 2025-04-24 PROCEDURE — 82728 ASSAY OF FERRITIN: CPT

## 2025-04-24 PROCEDURE — 3078F DIAST BP <80 MM HG: CPT | Performed by: NURSE PRACTITIONER

## 2025-04-24 PROCEDURE — 83550 IRON BINDING TEST: CPT

## 2025-04-24 RX ORDER — PROGESTERONE 200 MG/1
CAPSULE ORAL
COMMUNITY
Start: 2025-02-05

## 2025-04-24 RX ORDER — FERROUS SULFATE 324(65)MG
325 TABLET, DELAYED RELEASE (ENTERIC COATED) ORAL
COMMUNITY

## 2025-04-24 ASSESSMENT — FIBROSIS 4 INDEX: FIB4 SCORE: 0.85

## 2025-04-24 ASSESSMENT — PATIENT HEALTH QUESTIONNAIRE - PHQ9: CLINICAL INTERPRETATION OF PHQ2 SCORE: 0
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
## 2025-04-25 ENCOUNTER — PHARMACY VISIT (OUTPATIENT)
Dept: PHARMACY | Facility: MEDICAL CENTER | Age: 35
End: 2025-04-25
Payer: COMMERCIAL

## 2025-04-25 PROBLEM — O99.019 IRON DEFICIENCY ANEMIA DURING PREGNANCY: Status: ACTIVE | Noted: 2025-04-25

## 2025-04-25 PROBLEM — D50.9 IRON DEFICIENCY ANEMIA DURING PREGNANCY: Status: ACTIVE | Noted: 2025-04-25

## 2025-04-25 PROCEDURE — RXMED WILLOW AMBULATORY MEDICATION CHARGE: Performed by: INTERNAL MEDICINE

## 2025-04-25 RX ORDER — ONDANSETRON 8 MG/1
8 TABLET, ORALLY DISINTEGRATING ORAL PRN
OUTPATIENT
Start: 2025-04-25

## 2025-04-25 RX ORDER — CLOSTRIDIUM TETANI TOXOID ANTIGEN (FORMALDEHYDE INACTIVATED), CORYNEBACTERIUM DIPHTHERIAE TOXOID ANTIGEN (FORMALDEHYDE INACTIVATED), BORDETELLA PERTUSSIS TOXOID ANTIGEN (GLUTARALDEHYDE INACTIVATED), BORDETELLA PERTUSSIS FILAMENTOUS HEMAGGLUTININ ANTIGEN (FORMALDEHYDE INACTIVATED), BORDETELLA PERTUSSIS PERTACTIN ANTIGEN, AND BORDETELLA PERTUSSIS FIMBRIAE 2/3 ANTIGEN 5; 2; 2.5; 5; 3; 5 [LF]/.5ML; [LF]/.5ML; UG/.5ML; UG/.5ML; UG/.5ML; UG/.5ML
INJECTION, SUSPENSION INTRAMUSCULAR
Qty: 0.5 ML | Refills: 0 | OUTPATIENT
Start: 2025-04-25

## 2025-04-25 RX ORDER — METHYLPREDNISOLONE SODIUM SUCCINATE 125 MG/2ML
125 INJECTION, POWDER, LYOPHILIZED, FOR SOLUTION INTRAMUSCULAR; INTRAVENOUS PRN
OUTPATIENT
Start: 2025-04-25

## 2025-04-25 RX ORDER — SODIUM CHLORIDE 9 MG/ML
INJECTION, SOLUTION INTRAVENOUS CONTINUOUS
OUTPATIENT
Start: 2025-04-25

## 2025-04-25 RX ORDER — ALBUTEROL SULFATE 5 MG/ML
2.5 SOLUTION RESPIRATORY (INHALATION) PRN
OUTPATIENT
Start: 2025-04-25

## 2025-04-25 RX ORDER — ONDANSETRON 2 MG/ML
8 INJECTION INTRAMUSCULAR; INTRAVENOUS PRN
OUTPATIENT
Start: 2025-04-25

## 2025-04-25 RX ORDER — EPINEPHRINE 1 MG/ML(1)
0.5 AMPUL (ML) INJECTION PRN
OUTPATIENT
Start: 2025-04-25

## 2025-04-25 RX ORDER — ACETAMINOPHEN 325 MG/1
650 TABLET ORAL PRN
OUTPATIENT
Start: 2025-04-25

## 2025-04-25 RX ORDER — LORAZEPAM 0.5 MG/1
0.5 TABLET ORAL PRN
OUTPATIENT
Start: 2025-04-25

## 2025-04-25 RX ORDER — DIPHENHYDRAMINE HYDROCHLORIDE 50 MG/ML
25 INJECTION, SOLUTION INTRAMUSCULAR; INTRAVENOUS PRN
OUTPATIENT
Start: 2025-04-25

## 2025-04-25 RX ORDER — LORAZEPAM 2 MG/ML
0.5 INJECTION INTRAMUSCULAR PRN
OUTPATIENT
Start: 2025-04-25

## 2025-04-25 RX ORDER — SODIUM CHLORIDE 9 MG/ML
1000 INJECTION, SOLUTION INTRAVENOUS PRN
OUTPATIENT
Start: 2025-04-25

## 2025-04-25 RX ORDER — MEPERIDINE HYDROCHLORIDE 25 MG/ML
25 INJECTION INTRAMUSCULAR; INTRAVENOUS; SUBCUTANEOUS PRN
OUTPATIENT
Start: 2025-04-25

## 2025-04-28 ENCOUNTER — RESULTS FOLLOW-UP (OUTPATIENT)
Dept: MEDICAL GROUP | Facility: MEDICAL CENTER | Age: 35
End: 2025-04-28

## 2025-05-13 ENCOUNTER — OUTPATIENT INFUSION SERVICES (OUTPATIENT)
Dept: ONCOLOGY | Facility: MEDICAL CENTER | Age: 35
End: 2025-05-13
Attending: NURSE PRACTITIONER
Payer: COMMERCIAL

## 2025-05-13 VITALS
OXYGEN SATURATION: 100 % | TEMPERATURE: 97.2 F | WEIGHT: 186.73 LBS | HEART RATE: 99 BPM | RESPIRATION RATE: 18 BRPM | DIASTOLIC BLOOD PRESSURE: 59 MMHG | HEIGHT: 64 IN | BODY MASS INDEX: 31.88 KG/M2 | SYSTOLIC BLOOD PRESSURE: 102 MMHG

## 2025-05-13 DIAGNOSIS — D50.9 IRON DEFICIENCY ANEMIA DURING PREGNANCY: ICD-10-CM

## 2025-05-13 DIAGNOSIS — O99.019 IRON DEFICIENCY ANEMIA DURING PREGNANCY: ICD-10-CM

## 2025-05-13 PROCEDURE — 96365 THER/PROPH/DIAG IV INF INIT: CPT

## 2025-05-13 PROCEDURE — 700111 HCHG RX REV CODE 636 W/ 250 OVERRIDE (IP): Mod: JZ | Performed by: NURSE PRACTITIONER

## 2025-05-13 PROCEDURE — 700105 HCHG RX REV CODE 258: Performed by: NURSE PRACTITIONER

## 2025-05-13 RX ORDER — ALBUTEROL SULFATE 5 MG/ML
2.5 SOLUTION RESPIRATORY (INHALATION) PRN
Status: DISCONTINUED | OUTPATIENT
Start: 2025-05-13 | End: 2025-05-13 | Stop reason: HOSPADM

## 2025-05-13 RX ORDER — METHYLPREDNISOLONE SODIUM SUCCINATE 125 MG/2ML
125 INJECTION, POWDER, LYOPHILIZED, FOR SOLUTION INTRAMUSCULAR; INTRAVENOUS PRN
OUTPATIENT
Start: 2025-05-13

## 2025-05-13 RX ORDER — MEPERIDINE HYDROCHLORIDE 25 MG/ML
25 INJECTION INTRAMUSCULAR; INTRAVENOUS; SUBCUTANEOUS PRN
OUTPATIENT
Start: 2025-05-13

## 2025-05-13 RX ORDER — SODIUM CHLORIDE 9 MG/ML
1000 INJECTION, SOLUTION INTRAVENOUS PRN
Status: DISCONTINUED | OUTPATIENT
Start: 2025-05-13 | End: 2025-05-13 | Stop reason: HOSPADM

## 2025-05-13 RX ORDER — DIPHENHYDRAMINE HYDROCHLORIDE 50 MG/ML
25 INJECTION, SOLUTION INTRAMUSCULAR; INTRAVENOUS PRN
Status: DISCONTINUED | OUTPATIENT
Start: 2025-05-13 | End: 2025-05-13 | Stop reason: HOSPADM

## 2025-05-13 RX ORDER — SODIUM CHLORIDE 9 MG/ML
INJECTION, SOLUTION INTRAVENOUS CONTINUOUS
OUTPATIENT
Start: 2025-05-13

## 2025-05-13 RX ORDER — ACETAMINOPHEN 325 MG/1
650 TABLET ORAL PRN
Status: DISCONTINUED | OUTPATIENT
Start: 2025-05-13 | End: 2025-05-13 | Stop reason: HOSPADM

## 2025-05-13 RX ORDER — ONDANSETRON 8 MG/1
8 TABLET, ORALLY DISINTEGRATING ORAL PRN
OUTPATIENT
Start: 2025-05-13

## 2025-05-13 RX ORDER — ONDANSETRON 8 MG/1
8 TABLET, ORALLY DISINTEGRATING ORAL PRN
Status: DISCONTINUED | OUTPATIENT
Start: 2025-05-13 | End: 2025-05-13 | Stop reason: HOSPADM

## 2025-05-13 RX ORDER — ALBUTEROL SULFATE 5 MG/ML
2.5 SOLUTION RESPIRATORY (INHALATION) PRN
OUTPATIENT
Start: 2025-05-13

## 2025-05-13 RX ORDER — MEPERIDINE HYDROCHLORIDE 25 MG/ML
25 INJECTION INTRAMUSCULAR; INTRAVENOUS; SUBCUTANEOUS PRN
Status: DISCONTINUED | OUTPATIENT
Start: 2025-05-13 | End: 2025-05-13 | Stop reason: HOSPADM

## 2025-05-13 RX ORDER — EPINEPHRINE 1 MG/ML(1)
0.5 AMPUL (ML) INJECTION PRN
OUTPATIENT
Start: 2025-05-13

## 2025-05-13 RX ORDER — ONDANSETRON 2 MG/ML
8 INJECTION INTRAMUSCULAR; INTRAVENOUS PRN
OUTPATIENT
Start: 2025-05-13

## 2025-05-13 RX ORDER — LORAZEPAM 2 MG/ML
0.5 INJECTION INTRAMUSCULAR PRN
OUTPATIENT
Start: 2025-05-13

## 2025-05-13 RX ORDER — EPINEPHRINE 1 MG/ML(1)
0.5 AMPUL (ML) INJECTION PRN
Status: DISCONTINUED | OUTPATIENT
Start: 2025-05-13 | End: 2025-05-13 | Stop reason: HOSPADM

## 2025-05-13 RX ORDER — LORAZEPAM 1 MG/1
0.5 TABLET ORAL PRN
OUTPATIENT
Start: 2025-05-13

## 2025-05-13 RX ORDER — METHYLPREDNISOLONE SODIUM SUCCINATE 125 MG/2ML
125 INJECTION, POWDER, LYOPHILIZED, FOR SOLUTION INTRAMUSCULAR; INTRAVENOUS PRN
Status: DISCONTINUED | OUTPATIENT
Start: 2025-05-13 | End: 2025-05-13 | Stop reason: HOSPADM

## 2025-05-13 RX ORDER — DIPHENHYDRAMINE HYDROCHLORIDE 50 MG/ML
25 INJECTION, SOLUTION INTRAMUSCULAR; INTRAVENOUS PRN
OUTPATIENT
Start: 2025-05-13

## 2025-05-13 RX ORDER — ACETAMINOPHEN 325 MG/1
650 TABLET ORAL PRN
OUTPATIENT
Start: 2025-05-13

## 2025-05-13 RX ORDER — SODIUM CHLORIDE 9 MG/ML
1000 INJECTION, SOLUTION INTRAVENOUS PRN
OUTPATIENT
Start: 2025-05-13

## 2025-05-13 RX ORDER — LORAZEPAM 1 MG/1
0.5 TABLET ORAL PRN
Status: DISCONTINUED | OUTPATIENT
Start: 2025-05-13 | End: 2025-05-13 | Stop reason: HOSPADM

## 2025-05-13 RX ORDER — LORAZEPAM 2 MG/ML
0.5 INJECTION INTRAMUSCULAR PRN
Status: DISCONTINUED | OUTPATIENT
Start: 2025-05-13 | End: 2025-05-13 | Stop reason: HOSPADM

## 2025-05-13 RX ORDER — ONDANSETRON 2 MG/ML
8 INJECTION INTRAMUSCULAR; INTRAVENOUS PRN
Status: DISCONTINUED | OUTPATIENT
Start: 2025-05-13 | End: 2025-05-13 | Stop reason: HOSPADM

## 2025-05-13 RX ADMIN — SODIUM CHLORIDE 1000 MG: 9 INJECTION, SOLUTION INTRAVENOUS at 14:19

## 2025-05-13 ASSESSMENT — FIBROSIS 4 INDEX: FIB4 SCORE: 0.85

## 2025-05-13 NOTE — PROGRESS NOTES
Ms Cornejo is here today for iron infusion for iron deficiency anemia. She is 31 weeks gestation, history of gastric sleeve 3 years ago.    IV was placed to her left arm.   Labs are within parameters for treatment today.    Iron infused per MAR and was tolerated well.     IV removed and Ms Cornejo was discharged in stable condition.

## 2025-05-22 DIAGNOSIS — O99.019 IRON DEFICIENCY ANEMIA DURING PREGNANCY: Primary | ICD-10-CM

## 2025-05-22 DIAGNOSIS — D50.9 IRON DEFICIENCY ANEMIA DURING PREGNANCY: Primary | ICD-10-CM

## 2025-07-08 ENCOUNTER — HOSPITAL ENCOUNTER (OUTPATIENT)
Dept: LAB | Facility: MEDICAL CENTER | Age: 35
End: 2025-07-08
Attending: NURSE PRACTITIONER
Payer: COMMERCIAL

## 2025-07-08 DIAGNOSIS — D50.9 IRON DEFICIENCY ANEMIA DURING PREGNANCY: ICD-10-CM

## 2025-07-08 DIAGNOSIS — O99.019 IRON DEFICIENCY ANEMIA DURING PREGNANCY: ICD-10-CM

## 2025-07-08 LAB
ERYTHROCYTE [DISTWIDTH] IN BLOOD BY AUTOMATED COUNT: 73.2 FL (ref 35.9–50)
FERRITIN SERPL-MCNC: 12.8 NG/ML (ref 10–291)
HCT VFR BLD AUTO: 37.8 % (ref 37–47)
HGB BLD-MCNC: 12 G/DL (ref 12–16)
IRON SATN MFR SERPL: 14 % (ref 15–55)
IRON SERPL-MCNC: 67 UG/DL (ref 40–170)
MCH RBC QN AUTO: 29 PG (ref 27–33)
MCHC RBC AUTO-ENTMCNC: 31.7 G/DL (ref 32.2–35.5)
MCV RBC AUTO: 91.3 FL (ref 81.4–97.8)
PLATELET # BLD AUTO: 219 K/UL (ref 164–446)
PMV BLD AUTO: 11.5 FL (ref 9–12.9)
RBC # BLD AUTO: 4.14 M/UL (ref 4.2–5.4)
TIBC SERPL-MCNC: 477 UG/DL (ref 250–450)
UIBC SERPL-MCNC: 410 UG/DL (ref 110–370)
WBC # BLD AUTO: 10.5 K/UL (ref 4.8–10.8)

## 2025-07-08 PROCEDURE — 82728 ASSAY OF FERRITIN: CPT

## 2025-07-08 PROCEDURE — 83550 IRON BINDING TEST: CPT

## 2025-07-08 PROCEDURE — 36415 COLL VENOUS BLD VENIPUNCTURE: CPT

## 2025-07-08 PROCEDURE — 83540 ASSAY OF IRON: CPT

## 2025-07-08 PROCEDURE — 85027 COMPLETE CBC AUTOMATED: CPT

## 2025-07-10 ENCOUNTER — HOSPITAL ENCOUNTER (INPATIENT)
Facility: MEDICAL CENTER | Age: 35
LOS: 1 days | End: 2025-07-11
Attending: OBSTETRICS & GYNECOLOGY | Admitting: OBSTETRICS & GYNECOLOGY
Payer: COMMERCIAL

## 2025-07-10 ENCOUNTER — ANESTHESIA (OUTPATIENT)
Dept: ANESTHESIOLOGY | Facility: MEDICAL CENTER | Age: 35
End: 2025-07-10
Payer: COMMERCIAL

## 2025-07-10 ENCOUNTER — APPOINTMENT (OUTPATIENT)
Dept: OBGYN | Facility: MEDICAL CENTER | Age: 35
End: 2025-07-10
Attending: OBSTETRICS & GYNECOLOGY
Payer: COMMERCIAL

## 2025-07-10 ENCOUNTER — ANESTHESIA EVENT (OUTPATIENT)
Dept: ANESTHESIOLOGY | Facility: MEDICAL CENTER | Age: 35
End: 2025-07-10
Payer: COMMERCIAL

## 2025-07-10 LAB
ANISOCYTOSIS BLD QL SMEAR: ABNORMAL
BASOPHILS # BLD AUTO: 0.9 % (ref 0–1.8)
BASOPHILS # BLD: 0.07 K/UL (ref 0–0.12)
EOSINOPHIL # BLD AUTO: 0 K/UL (ref 0–0.51)
EOSINOPHIL NFR BLD: 0 % (ref 0–6.9)
ERYTHROCYTE [DISTWIDTH] IN BLOOD BY AUTOMATED COUNT: 69.1 FL (ref 35.9–50)
HCT VFR BLD AUTO: 36.2 % (ref 37–47)
HGB BLD-MCNC: 11.6 G/DL (ref 12–16)
HOLDING TUBE BB 8507: NORMAL
LG PLATELETS BLD QL SMEAR: NORMAL
LYMPHOCYTES # BLD AUTO: 0.92 K/UL (ref 1–4.8)
LYMPHOCYTES NFR BLD: 11.2 % (ref 22–41)
MANUAL DIFF BLD: NORMAL
MCH RBC QN AUTO: 28.9 PG (ref 27–33)
MCHC RBC AUTO-ENTMCNC: 32 G/DL (ref 32.2–35.5)
MCV RBC AUTO: 90 FL (ref 81.4–97.8)
MICROCYTES BLD QL SMEAR: ABNORMAL
MONOCYTES # BLD AUTO: 0.4 K/UL (ref 0–0.85)
MONOCYTES NFR BLD AUTO: 5.2 % (ref 0–13.4)
MORPHOLOGY BLD-IMP: NORMAL
NEUTROPHILS # BLD AUTO: 6.78 K/UL (ref 1.82–7.42)
NEUTROPHILS NFR BLD: 82.7 % (ref 44–72)
NRBC # BLD AUTO: 0 K/UL
NRBC BLD-RTO: 0 /100 WBC (ref 0–0.2)
PLATELET # BLD AUTO: 207 K/UL (ref 164–446)
PLATELET BLD QL SMEAR: NORMAL
PMV BLD AUTO: 10.9 FL (ref 9–12.9)
RBC # BLD AUTO: 4.02 M/UL (ref 4.2–5.4)
RBC BLD AUTO: PRESENT
T PALLIDUM AB SER QL IA: NORMAL
WBC # BLD AUTO: 8.2 K/UL (ref 4.8–10.8)
WBC TOXIC VACUOLES BLD QL SMEAR: NORMAL

## 2025-07-10 PROCEDURE — 770002 HCHG ROOM/CARE - OB PRIVATE (112)

## 2025-07-10 PROCEDURE — 59409 OBSTETRICAL CARE: CPT

## 2025-07-10 PROCEDURE — 700111 HCHG RX REV CODE 636 W/ 250 OVERRIDE (IP): Performed by: STUDENT IN AN ORGANIZED HEALTH CARE EDUCATION/TRAINING PROGRAM

## 2025-07-10 PROCEDURE — 36415 COLL VENOUS BLD VENIPUNCTURE: CPT

## 2025-07-10 PROCEDURE — 10907ZC DRAINAGE OF AMNIOTIC FLUID, THERAPEUTIC FROM PRODUCTS OF CONCEPTION, VIA NATURAL OR ARTIFICIAL OPENING: ICD-10-PCS | Performed by: OBSTETRICS & GYNECOLOGY

## 2025-07-10 PROCEDURE — 700111 HCHG RX REV CODE 636 W/ 250 OVERRIDE (IP): Mod: JZ | Performed by: OBSTETRICS & GYNECOLOGY

## 2025-07-10 PROCEDURE — A9270 NON-COVERED ITEM OR SERVICE: HCPCS | Performed by: OBSTETRICS & GYNECOLOGY

## 2025-07-10 PROCEDURE — 3E0234Z INTRODUCTION OF SERUM, TOXOID AND VACCINE INTO MUSCLE, PERCUTANEOUS APPROACH: ICD-10-PCS | Performed by: OBSTETRICS & GYNECOLOGY

## 2025-07-10 PROCEDURE — 700102 HCHG RX REV CODE 250 W/ 637 OVERRIDE(OP): Performed by: OBSTETRICS & GYNECOLOGY

## 2025-07-10 PROCEDURE — 700101 HCHG RX REV CODE 250: Performed by: STUDENT IN AN ORGANIZED HEALTH CARE EDUCATION/TRAINING PROGRAM

## 2025-07-10 PROCEDURE — 700105 HCHG RX REV CODE 258: Performed by: STUDENT IN AN ORGANIZED HEALTH CARE EDUCATION/TRAINING PROGRAM

## 2025-07-10 PROCEDURE — 85027 COMPLETE CBC AUTOMATED: CPT

## 2025-07-10 PROCEDURE — 700105 HCHG RX REV CODE 258: Performed by: OBSTETRICS & GYNECOLOGY

## 2025-07-10 PROCEDURE — 0HQ9XZZ REPAIR PERINEUM SKIN, EXTERNAL APPROACH: ICD-10-PCS | Performed by: OBSTETRICS & GYNECOLOGY

## 2025-07-10 PROCEDURE — 86780 TREPONEMA PALLIDUM: CPT

## 2025-07-10 PROCEDURE — 3E033VJ INTRODUCTION OF OTHER HORMONE INTO PERIPHERAL VEIN, PERCUTANEOUS APPROACH: ICD-10-PCS | Performed by: OBSTETRICS & GYNECOLOGY

## 2025-07-10 PROCEDURE — 303615 HCHG EPIDURAL/SPINAL ANESTHESIA FOR LABOR

## 2025-07-10 PROCEDURE — 304965 HCHG RECOVERY SERVICES

## 2025-07-10 PROCEDURE — 85007 BL SMEAR W/DIFF WBC COUNT: CPT

## 2025-07-10 RX ORDER — ONDANSETRON 2 MG/ML
4 INJECTION INTRAMUSCULAR; INTRAVENOUS EVERY 6 HOURS PRN
Status: DISCONTINUED | OUTPATIENT
Start: 2025-07-10 | End: 2025-07-10 | Stop reason: HOSPADM

## 2025-07-10 RX ORDER — CALCIUM CARBONATE 500 MG/1
1000 TABLET, CHEWABLE ORAL EVERY 6 HOURS PRN
Status: DISCONTINUED | OUTPATIENT
Start: 2025-07-10 | End: 2025-07-11 | Stop reason: HOSPADM

## 2025-07-10 RX ORDER — ALUMINA, MAGNESIA, AND SIMETHICONE 2400; 2400; 240 MG/30ML; MG/30ML; MG/30ML
30 SUSPENSION ORAL EVERY 6 HOURS PRN
Status: DISCONTINUED | OUTPATIENT
Start: 2025-07-10 | End: 2025-07-10 | Stop reason: HOSPADM

## 2025-07-10 RX ORDER — TERBUTALINE SULFATE 1 MG/ML
0.25 INJECTION SUBCUTANEOUS
Status: DISCONTINUED | OUTPATIENT
Start: 2025-07-10 | End: 2025-07-10 | Stop reason: HOSPADM

## 2025-07-10 RX ORDER — VITAMIN A ACETATE, BETA CAROTENE, ASCORBIC ACID, CHOLECALCIFEROL, .ALPHA.-TOCOPHEROL ACETATE, DL-, THIAMINE MONONITRATE, RIBOFLAVIN, NIACINAMIDE, PYRIDOXINE HYDROCHLORIDE, FOLIC ACID, CYANOCOBALAMIN, CALCIUM CARBONATE, FERROUS FUMARATE, ZINC OXIDE, CUPRIC OXIDE 3080; 12; 120; 400; 1; 1.84; 3; 20; 22; 920; 25; 200; 27; 10; 2 [IU]/1; UG/1; MG/1; [IU]/1; MG/1; MG/1; MG/1; MG/1; MG/1; [IU]/1; MG/1; MG/1; MG/1; MG/1; MG/1
1 TABLET, FILM COATED ORAL
Status: DISCONTINUED | OUTPATIENT
Start: 2025-07-10 | End: 2025-07-11 | Stop reason: HOSPADM

## 2025-07-10 RX ORDER — METHYLERGONOVINE MALEATE 0.2 MG/ML
0.2 INJECTION INTRAVENOUS
Status: DISCONTINUED | OUTPATIENT
Start: 2025-07-10 | End: 2025-07-11 | Stop reason: HOSPADM

## 2025-07-10 RX ORDER — DEXTROSE, SODIUM CHLORIDE, SODIUM LACTATE, POTASSIUM CHLORIDE, AND CALCIUM CHLORIDE 5; .6; .31; .03; .02 G/100ML; G/100ML; G/100ML; G/100ML; G/100ML
INJECTION, SOLUTION INTRAVENOUS CONTINUOUS
Status: DISCONTINUED | OUTPATIENT
Start: 2025-07-10 | End: 2025-07-11 | Stop reason: HOSPADM

## 2025-07-10 RX ORDER — SODIUM CHLORIDE, SODIUM LACTATE, POTASSIUM CHLORIDE, AND CALCIUM CHLORIDE .6; .31; .03; .02 G/100ML; G/100ML; G/100ML; G/100ML
250 INJECTION, SOLUTION INTRAVENOUS PRN
Status: DISCONTINUED | OUTPATIENT
Start: 2025-07-10 | End: 2025-07-10 | Stop reason: HOSPADM

## 2025-07-10 RX ORDER — LIDOCAINE HYDROCHLORIDE AND EPINEPHRINE 15; 5 MG/ML; UG/ML
INJECTION, SOLUTION EPIDURAL
Status: COMPLETED | OUTPATIENT
Start: 2025-07-10 | End: 2025-07-10

## 2025-07-10 RX ORDER — ACETAMINOPHEN 500 MG
1000 TABLET ORAL EVERY 6 HOURS PRN
Status: DISCONTINUED | OUTPATIENT
Start: 2025-07-10 | End: 2025-07-11 | Stop reason: HOSPADM

## 2025-07-10 RX ORDER — IBUPROFEN 800 MG/1
800 TABLET, FILM COATED ORAL EVERY 8 HOURS PRN
Status: DISCONTINUED | OUTPATIENT
Start: 2025-07-10 | End: 2025-07-11 | Stop reason: HOSPADM

## 2025-07-10 RX ORDER — EPHEDRINE SULFATE 50 MG/ML
5 INJECTION, SOLUTION INTRAVENOUS
Status: DISCONTINUED | OUTPATIENT
Start: 2025-07-10 | End: 2025-07-10 | Stop reason: HOSPADM

## 2025-07-10 RX ORDER — DOCUSATE SODIUM 100 MG/1
100 CAPSULE, LIQUID FILLED ORAL 2 TIMES DAILY PRN
Status: DISCONTINUED | OUTPATIENT
Start: 2025-07-10 | End: 2025-07-11 | Stop reason: HOSPADM

## 2025-07-10 RX ORDER — SODIUM CHLORIDE, SODIUM LACTATE, POTASSIUM CHLORIDE, CALCIUM CHLORIDE 600; 310; 30; 20 MG/100ML; MG/100ML; MG/100ML; MG/100ML
INJECTION, SOLUTION INTRAVENOUS CONTINUOUS
Status: DISCONTINUED | OUTPATIENT
Start: 2025-07-10 | End: 2025-07-11 | Stop reason: HOSPADM

## 2025-07-10 RX ORDER — MISOPROSTOL 200 UG/1
800 TABLET ORAL
Status: DISCONTINUED | OUTPATIENT
Start: 2025-07-10 | End: 2025-07-10 | Stop reason: HOSPADM

## 2025-07-10 RX ORDER — CARBOPROST TROMETHAMINE 250 UG/ML
250 INJECTION, SOLUTION INTRAMUSCULAR
Status: DISCONTINUED | OUTPATIENT
Start: 2025-07-10 | End: 2025-07-11 | Stop reason: HOSPADM

## 2025-07-10 RX ORDER — OXYTOCIN 10 [USP'U]/ML
10 INJECTION, SOLUTION INTRAMUSCULAR; INTRAVENOUS
Status: DISCONTINUED | OUTPATIENT
Start: 2025-07-10 | End: 2025-07-10 | Stop reason: HOSPADM

## 2025-07-10 RX ORDER — LIDOCAINE HYDROCHLORIDE 10 MG/ML
20 INJECTION, SOLUTION INFILTRATION; PERINEURAL
Status: DISCONTINUED | OUTPATIENT
Start: 2025-07-10 | End: 2025-07-10 | Stop reason: HOSPADM

## 2025-07-10 RX ORDER — BUPIVACAINE HYDROCHLORIDE 2.5 MG/ML
INJECTION, SOLUTION EPIDURAL; INFILTRATION; INTRACAUDAL; PERINEURAL
Status: COMPLETED | OUTPATIENT
Start: 2025-07-10 | End: 2025-07-10

## 2025-07-10 RX ORDER — SODIUM CHLORIDE, SODIUM LACTATE, POTASSIUM CHLORIDE, AND CALCIUM CHLORIDE .6; .31; .03; .02 G/100ML; G/100ML; G/100ML; G/100ML
1000 INJECTION, SOLUTION INTRAVENOUS
Status: COMPLETED | OUTPATIENT
Start: 2025-07-10 | End: 2025-07-10

## 2025-07-10 RX ORDER — ONDANSETRON 4 MG/1
4 TABLET, ORALLY DISINTEGRATING ORAL EVERY 6 HOURS PRN
Status: DISCONTINUED | OUTPATIENT
Start: 2025-07-10 | End: 2025-07-10 | Stop reason: HOSPADM

## 2025-07-10 RX ORDER — ROPIVACAINE HYDROCHLORIDE 2 MG/ML
INJECTION, SOLUTION EPIDURAL; INFILTRATION; PERINEURAL CONTINUOUS
Status: DISCONTINUED | OUTPATIENT
Start: 2025-07-10 | End: 2025-07-11 | Stop reason: HOSPADM

## 2025-07-10 RX ORDER — MISOPROSTOL 200 UG/1
600 TABLET ORAL
Status: DISCONTINUED | OUTPATIENT
Start: 2025-07-10 | End: 2025-07-11 | Stop reason: HOSPADM

## 2025-07-10 RX ORDER — SIMETHICONE 125 MG
125 TABLET,CHEWABLE ORAL 4 TIMES DAILY PRN
Status: DISCONTINUED | OUTPATIENT
Start: 2025-07-10 | End: 2025-07-11 | Stop reason: HOSPADM

## 2025-07-10 RX ORDER — ACETAMINOPHEN 500 MG
1000 TABLET ORAL
Status: DISCONTINUED | OUTPATIENT
Start: 2025-07-10 | End: 2025-07-10 | Stop reason: HOSPADM

## 2025-07-10 RX ORDER — SODIUM CHLORIDE, SODIUM LACTATE, POTASSIUM CHLORIDE, CALCIUM CHLORIDE 600; 310; 30; 20 MG/100ML; MG/100ML; MG/100ML; MG/100ML
2000 INJECTION, SOLUTION INTRAVENOUS PRN
Status: DISCONTINUED | OUTPATIENT
Start: 2025-07-10 | End: 2025-07-11 | Stop reason: HOSPADM

## 2025-07-10 RX ORDER — IBUPROFEN 800 MG/1
800 TABLET, FILM COATED ORAL
Status: DISCONTINUED | OUTPATIENT
Start: 2025-07-10 | End: 2025-07-10 | Stop reason: HOSPADM

## 2025-07-10 RX ORDER — BUPIVACAINE HYDROCHLORIDE 2.5 MG/ML
INJECTION, SOLUTION EPIDURAL; INFILTRATION; INTRACAUDAL; PERINEURAL
Status: COMPLETED
Start: 2025-07-10 | End: 2025-07-10

## 2025-07-10 RX ADMIN — SODIUM CHLORIDE, SODIUM LACTATE, POTASSIUM CHLORIDE, CALCIUM CHLORIDE AND DEXTROSE MONOHYDRATE: 5; 600; 310; 30; 20 INJECTION, SOLUTION INTRAVENOUS at 14:13

## 2025-07-10 RX ADMIN — IBUPROFEN 800 MG: 800 TABLET, FILM COATED ORAL at 19:56

## 2025-07-10 RX ADMIN — OXYTOCIN 125 ML/HR: 10 INJECTION, SOLUTION INTRAMUSCULAR; INTRAVENOUS at 18:05

## 2025-07-10 RX ADMIN — SODIUM CHLORIDE, SODIUM LACTATE, POTASSIUM CHLORIDE, CALCIUM CHLORIDE AND DEXTROSE MONOHYDRATE: 5; 600; 310; 30; 20 INJECTION, SOLUTION INTRAVENOUS at 07:37

## 2025-07-10 RX ADMIN — OXYTOCIN 2 MILLI-UNITS/MIN: 10 INJECTION, SOLUTION INTRAMUSCULAR; INTRAVENOUS at 08:08

## 2025-07-10 RX ADMIN — ROPIVACAINE HYDROCHLORIDE: 2 INJECTION, SOLUTION EPIDURAL; INFILTRATION; PERINEURAL at 10:52

## 2025-07-10 RX ADMIN — PRENATAL WITH FERROUS FUM AND FOLIC ACID 1 TABLET: 3080; 920; 120; 400; 22; 1.84; 3; 20; 10; 1; 12; 200; 27; 25; 2 TABLET ORAL at 19:56

## 2025-07-10 RX ADMIN — OXYTOCIN 20 UNITS: 10 INJECTION, SOLUTION INTRAMUSCULAR; INTRAVENOUS at 15:37

## 2025-07-10 RX ADMIN — LIDOCAINE HYDROCHLORIDE,EPINEPHRINE BITARTRATE 3 ML: 15; .005 INJECTION, SOLUTION EPIDURAL; INFILTRATION; INTRACAUDAL; PERINEURAL at 10:15

## 2025-07-10 RX ADMIN — OXYTOCIN 125 ML/HR: 10 INJECTION, SOLUTION INTRAMUSCULAR; INTRAVENOUS at 16:59

## 2025-07-10 RX ADMIN — SODIUM CHLORIDE, POTASSIUM CHLORIDE, SODIUM LACTATE AND CALCIUM CHLORIDE 1000 ML: 600; 310; 30; 20 INJECTION, SOLUTION INTRAVENOUS at 09:52

## 2025-07-10 RX ADMIN — SODIUM CHLORIDE, POTASSIUM CHLORIDE, SODIUM LACTATE AND CALCIUM CHLORIDE: 600; 310; 30; 20 INJECTION, SOLUTION INTRAVENOUS at 11:06

## 2025-07-10 RX ADMIN — BUPIVACAINE HYDROCHLORIDE 5 ML: 2.5 INJECTION, SOLUTION EPIDURAL; INFILTRATION; INTRACAUDAL at 10:15

## 2025-07-10 ASSESSMENT — PAIN DESCRIPTION - PAIN TYPE
TYPE: ACUTE PAIN

## 2025-07-10 ASSESSMENT — SOCIAL DETERMINANTS OF HEALTH (SDOH)
WITHIN THE PAST 12 MONTHS, THE FOOD YOU BOUGHT JUST DIDN'T LAST AND YOU DIDN'T HAVE MONEY TO GET MORE: NEVER TRUE
IN THE PAST 12 MONTHS, HAS THE ELECTRIC, GAS, OIL, OR WATER COMPANY THREATENED TO SHUT OFF SERVICE IN YOUR HOME?: NO
WITHIN THE LAST YEAR, HAVE YOU BEEN AFRAID OF YOUR PARTNER OR EX-PARTNER?: NO
WITHIN THE LAST YEAR, HAVE TO BEEN RAPED OR FORCED TO HAVE ANY KIND OF SEXUAL ACTIVITY BY YOUR PARTNER OR EX-PARTNER?: NO
WITHIN THE LAST YEAR, HAVE YOU BEEN HUMILIATED OR EMOTIONALLY ABUSED IN OTHER WAYS BY YOUR PARTNER OR EX-PARTNER?: NO
WITHIN THE PAST 12 MONTHS, YOU WORRIED THAT YOUR FOOD WOULD RUN OUT BEFORE YOU GOT THE MONEY TO BUY MORE: NEVER TRUE
WITHIN THE LAST YEAR, HAVE YOU BEEN KICKED, HIT, SLAPPED, OR OTHERWISE PHYSICALLY HURT BY YOUR PARTNER OR EX-PARTNER?: NO

## 2025-07-10 ASSESSMENT — FIBROSIS 4 INDEX: FIB4 SCORE: 1.02

## 2025-07-10 ASSESSMENT — PAIN SCALES - GENERAL
PAINLEVEL: 0 - NO PAIN
PAIN_LEVEL: 2

## 2025-07-10 ASSESSMENT — LIFESTYLE VARIABLES: ALCOHOL_USE: NO

## 2025-07-10 NOTE — PROGRESS NOTES
Labor Progress Note    Dee Cornejo   39w4d      Subjective:  Pt comfortable with epidural.  Uterine contractions:yes  Pain: No    Objective:   Vitals:    07/10/25 1145 07/10/25 1205 07/10/25 1225 07/10/25 1245   BP: (!) 88/53 91/55 100/57 92/50   Pulse: 75 76 91 84   Resp:       Temp:       TempSrc:       SpO2:       Weight:         FHT: 140 cat I-II  [place]: q 2-3  SVE:5/80/-2, arom, clear    Membranes ruptured: .yes    Meds:   Epidural : .yes  Pitocin: .yes, 5 mu    Labs:  Recent Results (from the past 24 hours)   CBC with differential    Collection Time: 07/10/25  5:55 AM   Result Value Ref Range    WBC 8.2 4.8 - 10.8 K/uL    RBC 4.02 (L) 4.20 - 5.40 M/uL    Hemoglobin 11.6 (L) 12.0 - 16.0 g/dL    Hematocrit 36.2 (L) 37.0 - 47.0 %    MCV 90.0 81.4 - 97.8 fL    MCH 28.9 27.0 - 33.0 pg    MCHC 32.0 (L) 32.2 - 35.5 g/dL    RDW 69.1 (H) 35.9 - 50.0 fL    Platelet Count 207 164 - 446 K/uL    MPV 10.9 9.0 - 12.9 fL    Neutrophils-Polys 82.70 (H) 44.00 - 72.00 %    Lymphocytes 11.20 (L) 22.00 - 41.00 %    Monocytes 5.20 0.00 - 13.40 %    Eosinophils 0.00 0.00 - 6.90 %    Basophils 0.90 0.00 - 1.80 %    Nucleated RBC 0.00 0.00 - 0.20 /100 WBC    Neutrophils (Absolute) 6.78 1.82 - 7.42 K/uL    Lymphs (Absolute) 0.92 (L) 1.00 - 4.80 K/uL    Monos (Absolute) 0.40 0.00 - 0.85 K/uL    Eos (Absolute) 0.00 0.00 - 0.51 K/uL    Baso (Absolute) 0.07 0.00 - 0.12 K/uL    NRBC (Absolute) 0.00 K/uL    Anisocytosis 1+     Microcytosis 1+    T.PALLIDUM AB NICHOLE (Syphilis)    Collection Time: 07/10/25  5:55 AM   Result Value Ref Range    Syphilis, Treponemal Qual Non-Reactive Non-Reactive   HOLD BLOOD BANK SPECIMEN (NOT TESTED)    Collection Time: 07/10/25  5:55 AM   Result Value Ref Range    Holding Tube - Bb DONE    DIFFERENTIAL MANUAL    Collection Time: 07/10/25  5:55 AM   Result Value Ref Range    Manual Diff Status PERFORMED    PERIPHERAL SMEAR REVIEW    Collection Time: 07/10/25  5:55 AM   Result Value Ref Range     Peripheral Smear Review see below    PLATELET ESTIMATE    Collection Time: 07/10/25  5:55 AM   Result Value Ref Range    Plt Estimation Normal    MORPHOLOGY    Collection Time: 07/10/25  5:55 AM   Result Value Ref Range    RBC Morphology Present     Large Platelets 1+     Toxic Vacuoles Few        Assessment:   39w4d/active labor-progressing. Exp .  GBBS negative  Fetal status-reassuring        Rachel Gardner M.D.

## 2025-07-10 NOTE — ANESTHESIA PREPROCEDURE EVALUATION
Date: 07/10/25  Procedure: Labor Epidural       33 yo F  w/ GERD requesting labor epidural    Relevant Problems   GI   (positive) Gastroesophageal reflux disease without esophagitis       Physical Exam    Airway   Mallampati: III  TM distance: >3 FB  Neck ROM: full       Cardiovascular - normal exam  Rhythm: regular  Rate: normal    (-) murmur     Dental - normal exam           Pulmonary - normal examBreath sounds clear to auscultation     Abdominal    Neurological - normal exam                   Anesthesia Plan    ASA 2       Plan - epidural   Neuraxial block will be labor analgesia                  Pertinent diagnostic labs and testing reviewed    Informed Consent:    Anesthetic plan and risks discussed with patient.    Use of blood products discussed with: patient whom consented to blood products.

## 2025-07-10 NOTE — ANESTHESIA PROCEDURE NOTES
Epidural Block    Date/Time: 7/10/2025 10:15 AM    Performed by: Amauri Deal MD, PhD  Authorized by: Amauri Deal MD, PhD    Patient Location:  OB  Start Time:  7/10/2025 10:15 AM  End Time:  7/10/2025 10:24 AM  Reason for Block: labor analgesia    patient identified, IV checked, site marked, risks and benefits discussed, surgical consent, monitors and equipment checked, pre-op evaluation and timeout performed    Patient Position:  Sitting  Prep: ChloraPrep, patient draped and sterile technique    Monitoring:  Blood pressure, continuous pulse oximetry and heart rate  Approach:  Midline  Location:  L3-L4  Injection Technique:  FABIANO saline  Skin infiltration:  Lidocaine  Strength:  1%  Dose:  3ml  Needle Type:  Tuohy  Needle Gauge:  17 G  Needle Length:  3.5 in  Loss of resistance::  7  Catheter Size:  19 G  Catheter at Skin Depth:  13

## 2025-07-10 NOTE — PROGRESS NOTES
Labor Progress Note    Dee Orosconathaniel Cornejo   39w4d      Subjective:  Pt is here for IOL. Pt with mild cramping.  Uterine contractions:yes  Pain: No    Objective:   Vitals:    07/10/25 0530 07/10/25 0550   BP:  98/55   Pulse:  86   Resp:  17   Temp:  35.9 °C (96.6 °F)   TempSrc:  Temporal   SpO2:  96%   Weight: 84.4 kg (186 lb)      FHT: 140's cat I-II  [place]: irregular  SVE:3-4/50/-3     Membranes ruptured: .no    Meds:   none    Labs:  Recent Results (from the past 24 hours)   CBC with differential    Collection Time: 07/10/25  5:55 AM   Result Value Ref Range    WBC 8.2 4.8 - 10.8 K/uL    RBC 4.02 (L) 4.20 - 5.40 M/uL    Hemoglobin 11.6 (L) 12.0 - 16.0 g/dL    Hematocrit 36.2 (L) 37.0 - 47.0 %    MCV 90.0 81.4 - 97.8 fL    MCH 28.9 27.0 - 33.0 pg    MCHC 32.0 (L) 32.2 - 35.5 g/dL    RDW 69.1 (H) 35.9 - 50.0 fL    Platelet Count 207 164 - 446 K/uL    MPV 10.9 9.0 - 12.9 fL    Neutrophils-Polys 82.70 (H) 44.00 - 72.00 %    Lymphocytes 11.20 (L) 22.00 - 41.00 %    Monocytes 5.20 0.00 - 13.40 %    Eosinophils 0.00 0.00 - 6.90 %    Basophils 0.90 0.00 - 1.80 %    Nucleated RBC 0.00 0.00 - 0.20 /100 WBC    Neutrophils (Absolute) 6.78 1.82 - 7.42 K/uL    Lymphs (Absolute) 0.92 (L) 1.00 - 4.80 K/uL    Monos (Absolute) 0.40 0.00 - 0.85 K/uL    Eos (Absolute) 0.00 0.00 - 0.51 K/uL    Baso (Absolute) 0.07 0.00 - 0.12 K/uL    NRBC (Absolute) 0.00 K/uL    Anisocytosis 1+     Microcytosis 1+    T.PALLIDUM AB NICHOLE (Syphilis)    Collection Time: 07/10/25  5:55 AM   Result Value Ref Range    Syphilis, Treponemal Qual Non-Reactive Non-Reactive   HOLD BLOOD BANK SPECIMEN (NOT TESTED)    Collection Time: 07/10/25  5:55 AM   Result Value Ref Range    Holding Tube - Bb DONE    DIFFERENTIAL MANUAL    Collection Time: 07/10/25  5:55 AM   Result Value Ref Range    Manual Diff Status PERFORMED    PERIPHERAL SMEAR REVIEW    Collection Time: 07/10/25  5:55 AM   Result Value Ref Range    Peripheral Smear Review see below    PLATELET  ESTIMATE    Collection Time: 07/10/25  5:55 AM   Result Value Ref Range    Plt Estimation Normal    MORPHOLOGY    Collection Time: 07/10/25  5:55 AM   Result Value Ref Range    RBC Morphology Present     Large Platelets 1+     Toxic Vacuoles Few        Assessment:   39w4d/IOL-start Pitocin. Exp .  H/o of iron deficiency anemia-Pt s/p Iron infusion  GBBS negative          Rachel Gardner M.D.

## 2025-07-10 NOTE — PROGRESS NOTES
Pt here for scheduled elective IOL. Pt is  with EDC  making her 39&4 today. EFM/[place] applied.     0728: Tracing reviewed by Dr Gardner. Order for D5LR infusion.     0804: Dr Gardner at bedside. SVE charted. Tracing reviewed, order to start pitocin.     0858: Pt requesting epidural. Anesthesia unavailable at this time.     1015: Dr Deal at bedside for epidural placement.     1024: Test dose negative.     1200: Care relinquished to Eliane KENT. POC discussed.    1230: Reassumed care.     1500: Pt called out reporting increased pressure. SVE complete.     1510: Dr Gardner called with MD filipe to come to bedside.     1526: Dr Gardner at bedside, began pushing.     1535:  of viable male infant.     1538: Delivery of intact placenta.     1745: Pt into wheelchair.     1800: Pt to postpartum with infant in arms. Report to Ag KENT. POC discussed.

## 2025-07-10 NOTE — L&D DELIVERY NOTE
Delivery note  , male with apgars 8 and 9. Placenta intact, 3 vc noted. 1st degree vaginal laceration repaired with 2-0 vicryl on ct-1 needle. Mom and baby doing well.  .

## 2025-07-10 NOTE — H&P
History and Physical    Dee Cornejo is a 34 y.o. female  -Para:     Gestational Age:  39w4d  Admitted for:   Induction of Labor  Admitted to  [facility] Labor and Delivery.  Patient received prenatal care: Dr Gardner    HPI: Patient is admitted with the above mentioned Chief Complaint and States   Loss of fluid:   negative  Abdominal Pain:  negative  Uterine Contractions:  negative  Vaginal Bleeding:  negative  Fetal Movement:  normal  Patient denies fever, chills, nausea, vomiting , headache, visual disturbance, or dysuria  Patient's last menstrual period was 2024 (approximate).  Estimated Date of Delivery: 25  Final SHAHRAM: 2025, by Patient Reported    Patient Active Problem List    Diagnosis Date Noted    Iron deficiency anemia during pregnancy 2025    Gastroesophageal reflux disease without esophagitis 2025    S/P gastric sleeve procedure 2025    Other specified anemias 2025    Vitamin D deficiency 2025    Dyslipidemia 2025    Subacromial impingement of left shoulder 10/20/2023       Admitting DX: Indication for care in labor or delivery [O75.9]   Pregnancy Complications:  AMA    History:   has a past medical history of Allergy, Anemia, Anxiety, COVID-19 (, 22), Depression, GERD (gastroesophageal reflux disease), Migraine, Morbid obesity (HCC) (2022), Pain (2022), PCOS (polycystic ovarian syndrome) (2022), and UTI (lower urinary tract infection).     has a past surgical history that includes pr lap cholecystectomy (); other (); pr lap jesu restrict proc longitudinal gas* (N/A, 2022); pr shldr arthroscop part debride 1-2 (Left, 2023); pr shldr arthroscop,part acromioplas (Left, 2023); and pr repair biceps long tendon (Left, 2023).    OB History    Para Term  AB Living   5 2 1 1 2 2   SAB IAB Ectopic Molar Multiple Live Births   0 0 0 0 0 2      # Outcome Date GA  Lbr Arsenio/2nd Weight Sex Type Anes PTL Lv   5 Current            4 AB 24     TAB      3 AB      TAB      2  17 32w0d   M Vag-Spont   MOUNA   1 Term 2010 40w0d 03:00 3.685 kg (8 lb 2 oz) F Vag-Spont  N MOUNA      Birth Comments: breast/bottle  feeding       Medications:  Medications Ordered Prior to Encounter[1]    Allergies:  Famotidine, Omeprazole, Azithromycin, and Shrimp flavor           Physical Exam:  BP 92/50   Pulse 84   Temp 35.9 °C (96.6 °F) (Temporal)   Resp 17   Wt 84.4 kg (186 lb)   LMP 2024 (Approximate) Comment: approx 7-8weeks pregnant  SpO2 95%   BMI 31.93 kg/m²   Constitutional: comfortable with epidural  Cervical Exam: 80%  Cervix Dilatation: 5  Station: negative 2  Pelvis: Normal  Fetal Assessment: Fetal heart variability: 140's cat I-II      Labs:  Recent Labs     25  1205 07/10/25  0555   WBC 10.5 8.2   RBC 4.14* 4.02*   HEMOGLOBIN 12.0 11.6*   HEMATOCRIT 37.8 36.2*   MCV 91.3 90.0   MCH 29.0 28.9   MCHC 31.7* 32.0*   RDW 73.2* 69.1*   PLATELETCT 219 207   MPV 11.5 10.9     Prenatal Results       General (Most Recent Result)       Test Value Date Time    ABO  O  24 1544    Rh  POS  24 1544    Antibody screen  NEG  24 1544    HbA1c  4.8 % 24 1541    Chlamydia by PCR  Negative  24 1805    Gonorrhea by PCR  Negative  24 1805    RPR/Syphilus  Non-Reactive  07/10/25 0555    HSV 1/2 by PCR (non-serum)  (See Report)   09 0937    HSV 1/2 (serum)       HSV 1       HSV 2       HPV (16)  Negative  22 1300    HBsAg  Non-Reactive  24 1541    HIV-1 HIV-2 Antibodies  Non-Reactive  24 1541    Rubella  297.00 IU/mL 24 1541    Tb                 Pap Smear (Most Recent Result)       Test Value Date Time    Pap smear       Pap smear w/HPV       Pap smear w/CTNG       Pap smar w/HPV CTNG  (See Report)   22 1300    Pap smear (reflex HPV ACUS)       Pap smear (reflex HPV ASCUS w/CTNG)       Pathology gyn  specimen  (See Report)   11/14/22 1300              Urinalysis (Most Recent Result)       Test Value Date Time    Urinalysis       POC urinalysis  Abnormal   (See Report)   10/18/24 1358    Urine drug screen (w/ conf)       Urine culture ([id number])  (See Report)   09/02/20 1229    Urine Protein/Creatinine Ratio                 Urinalysis, Culture if indicated       Test Value Date Time    Color  Yellow  05/26/24 1715    Appearance  Clear  05/26/24 1715    Specific Gravity  1.020  05/26/24 1715    PH  7.0  05/26/24 1715    Glucose  Negative mg/dL 05/26/24 1715    Ketones  Trace mg/dL 05/26/24 1715    Protein  Negative mg/dL 05/26/24 1715    Bilirubin  Negative  05/26/24 1715    Nitrites  Negative  05/26/24 1715    Leukocytes Esterase  Negative  05/26/24 1715    Blood  Negative  05/26/24 1715    Comment  see below  05/26/24 1715    Culture  Yes UA Culture 08/23/16 0705              Urine Drug Screen       Test Value Date Time    Amphetamines       Barbiturates       Benzodiazepines       Cocaine       Methadone       Opiates       Oxycodone       Phencylidine       Propoxyphene       Marijuana Metabolite                 1st Trimester       Test Value Date Time    Hgb  11.0 g/dL 11/30/24 0100    Hct  33.0 % 11/30/24 0100    Fasting Glucose Tolerance       GTT, 1 hour       GTT, 2 hours       GTT, 3 hours                 2nd Trimester       Test Value Date Time    Hgb  9.1 g/dL 04/16/25 0726    Hct  30.4 % 04/16/25 0726    AST  18 U/L 04/16/25 0726    ALT  8 U/L 04/16/25 0726    Uric Acid       Fasting Glucose Tolerance       GTT, 1 hour  75 mg/dL 04/16/25 0830    GTT, 2 hours       GTT, 3 hours                 3rd Trimester       Test Value Date Time    Hgb  11.6 g/dL 07/10/25 0555       12.0 g/dL 07/08/25 1205    Hct  36.2 % 07/10/25 0555       37.8 % 07/08/25 1205    Platelet count  207 K/uL 07/10/25 0555       219 K/uL 07/08/25 1205    GBS ([facility])       Fasting Glucose Tolerance       GTT, 1 hour       GTT, 2  hous       GTT, 3hours                 Congenital Disease Screening       Test Value Date Time    First Trimester Screen       Quad Screen       BH Electrophoresis       Cystic Fibrosis Carrier Study       SMA       AFP Maternal Serum   (See Report)   25 1304    AFP Tetra  (See Report)   16 1604    NIPT                 Legend    ^: Historical                              Assessment:  Gestational Age:  39w4d  Labor State:   Labor, Active  Risk Factors:   advanced maternal age      Patient Active Problem List    Diagnosis Date Noted    Iron deficiency anemia during pregnancy 2025    Gastroesophageal reflux disease without esophagitis 2025    S/P gastric sleeve procedure 2025    Other specified anemias 2025    Vitamin D deficiency 2025    Dyslipidemia 2025    Subacromial impingement of left shoulder 10/20/2023       Plan:   Admitted for: IOL on pitocin. Exp     Rachel Gardner M.D.                     [1]   No current facility-administered medications on file prior to encounter.     Current Outpatient Medications on File Prior to Encounter   Medication Sig Dispense Refill    Prenatal MV-Min-Fe Fum-FA-DHA (PRENATAL 1 PO) Take 1 Tablet by mouth every day.      lansoprazole (PREVACID) 30 MG CAPSULE DELAYED RELEASE Take 1 Capsule by mouth every day.      ferrous sulfate 324 MG Tablet Delayed Response Take 325 mg by mouth.      therapeutic multivitamin-minerals (THERAGRAN-M) Tab Take 1 Tablet by mouth every day. (Patient not taking: Reported on 2024)

## 2025-07-11 VITALS
TEMPERATURE: 97.2 F | BODY MASS INDEX: 31.93 KG/M2 | RESPIRATION RATE: 18 BRPM | DIASTOLIC BLOOD PRESSURE: 60 MMHG | SYSTOLIC BLOOD PRESSURE: 100 MMHG | HEART RATE: 87 BPM | OXYGEN SATURATION: 95 % | WEIGHT: 186 LBS

## 2025-07-11 LAB
ERYTHROCYTE [DISTWIDTH] IN BLOOD BY AUTOMATED COUNT: 68.9 FL (ref 35.9–50)
HCT VFR BLD AUTO: 34.3 % (ref 37–47)
HGB BLD-MCNC: 10.9 G/DL (ref 12–16)
MCH RBC QN AUTO: 28.6 PG (ref 27–33)
MCHC RBC AUTO-ENTMCNC: 31.8 G/DL (ref 32.2–35.5)
MCV RBC AUTO: 90 FL (ref 81.4–97.8)
PLATELET # BLD AUTO: 190 K/UL (ref 164–446)
PMV BLD AUTO: 11 FL (ref 9–12.9)
RBC # BLD AUTO: 3.81 M/UL (ref 4.2–5.4)
WBC # BLD AUTO: 13.1 K/UL (ref 4.8–10.8)

## 2025-07-11 PROCEDURE — 700102 HCHG RX REV CODE 250 W/ 637 OVERRIDE(OP): Performed by: OBSTETRICS & GYNECOLOGY

## 2025-07-11 PROCEDURE — A9270 NON-COVERED ITEM OR SERVICE: HCPCS | Performed by: OBSTETRICS & GYNECOLOGY

## 2025-07-11 PROCEDURE — 36415 COLL VENOUS BLD VENIPUNCTURE: CPT

## 2025-07-11 PROCEDURE — 85027 COMPLETE CBC AUTOMATED: CPT

## 2025-07-11 RX ADMIN — PRENATAL WITH FERROUS FUM AND FOLIC ACID 1 TABLET: 3080; 920; 120; 400; 22; 1.84; 3; 20; 10; 1; 12; 200; 27; 25; 2 TABLET ORAL at 09:10

## 2025-07-11 RX ADMIN — ACETAMINOPHEN 1000 MG: 500 TABLET ORAL at 12:18

## 2025-07-11 RX ADMIN — IBUPROFEN 800 MG: 800 TABLET, FILM COATED ORAL at 07:02

## 2025-07-11 ASSESSMENT — EDINBURGH POSTNATAL DEPRESSION SCALE (EPDS)
I HAVE BEEN ABLE TO LAUGH AND SEE THE FUNNY SIDE OF THINGS: AS MUCH AS I ALWAYS COULD
I HAVE BLAMED MYSELF UNNECESSARILY WHEN THINGS WENT WRONG: YES, SOME OF THE TIME
I HAVE FELT SCARED OR PANICKY FOR NO GOOD REASON: NO, NOT AT ALL
I HAVE BEEN SO UNHAPPY THAT I HAVE BEEN CRYING: NO, NEVER
I HAVE LOOKED FORWARD WITH ENJOYMENT TO THINGS: AS MUCH AS I EVER DID
I HAVE FELT SAD OR MISERABLE: NO, NOT AT ALL
THINGS HAVE BEEN GETTING ON TOP OF ME: NO, I HAVE BEEN COPING AS WELL AS EVER
I HAVE BEEN SO UNHAPPY THAT I HAVE HAD DIFFICULTY SLEEPING: NOT AT ALL
I HAVE BEEN ANXIOUS OR WORRIED FOR NO GOOD REASON: NO, NOT AT ALL
THE THOUGHT OF HARMING MYSELF HAS OCCURRED TO ME: NEVER

## 2025-07-11 ASSESSMENT — PAIN DESCRIPTION - PAIN TYPE
TYPE: ACUTE PAIN

## 2025-07-11 NOTE — DISCHARGE INSTRUCTIONS
Detail Level: Generalized PATIENT DISCHARGE EDUCATION INSTRUCTION SHEET    REASONS TO CALL YOUR OBSTETRICIAN  Persistent fever, shaking, chills (Temperature higher than 100.4) may indicate you have an infection  Heavy bleeding: soaking more than 1 pad per hour; Passing clots an egg-sized clot or bigger may mean you have an postpartum hemorrhage  Foul odor from vagina or bad smelling or discolored discharge or blood  Breast infection (Mastitis symptoms); breast pain, chills, fever, redness or red streaks, may feel flu like symptoms  Urinary pain, burning or frequency  Incision that is not healing, increased redness, swelling, tenderness or pain, or any pus from episiotomy or  site may mean you have an infection  Redness, swelling, warmth, or painful to touch in the calf area of your leg may mean you have a blood clot  Severe or intensified depression, thoughts or feelings of wanting to hurt yourself or someone else   Pain in chest, obstructed breathing or shortness of breath (trouble catching your breath) may mean you are having a postpartum complication. Call your provider immediately   Headache that does not get better, even after taking medicine, a bad headache with vision changes or pain in the upper right area of your belly may mean you have high blood pressure or post birth preeclampsia. Call your provider immediately    HAND WASHING  All family and friends should wash their hands:  Before and after holding the baby  Before feeding the baby  After using the restroom or changing the baby's diaper    WOUND CARE  Ask your physician for additional care instructions. In general:   Incision:  May shower and pat incision dry   Keep the incision clean and dry  There should not be any opening or pus from the incision  Continue to walk at home 3 times a day   Do NOT lift anything heavier than your baby (over 10 pounds)  Encourage family to help participate in care of the  to allow rest and mom time to  Detail Level: Zone heal  Episiotomy/Laceration  May use dafne-spray bottle, witch hazel pads and dermaplast spray for comfort  Use dfane-spray bottle after urinating to cleanse perineal area  To prevent burning during urination spray dafne-water bottle on labial area   Pat perineal area dry until episiotomy/laceration is healed  Continue to use dafne-bottle until bleeding stops as needed  If have a 2nd degree laceration or greater, a Sitz bath can offer relief from soreness, burning, and inflammation   Sitz Bath   Sit in 6 inches of warm water and soak laceration as needed until the laceration heals    VAGINAL CARE AND BLEEDING  Nothing inside vagina for 6 weeks:   No sexual intercourse, tampons or douching  Bleeding may continue for 2-4 weeks. Amount and color may vary  Soaking 1 pad or more in an hour for several hours is considered heavy bleeding  Passing large egg sized blood clots can be concerning  If you feel like you have heavy bleeding or are having increasing amount of blood clots call your Obstetrician immediately  If you begin feeling faint upon standing, feeling sick to your stomach, have clammy skin, a really fast heartbeat, have chills, start feeling confused, dizzy, sleepy or weak, or feeling like you're going to faint call your Obstetrician immediately    HYPERTENSION   Preeclampsia or gestational hypertension are types of high blood pressure that only pregnant women can get. It is important for you to be aware of symptoms to seek early intervention and treatment. If you have any of these symptoms immediately call your Obstetrician    Vision changes or blurred vision   Severe headache or pain that is unrelieved with medication and will not go away  Persistent pain in upper abdomen or shoulder   Increased swelling of face, feet, or hands  Difficulty breathing or shortness of breath at rest  Urinating less than usual    URINATION AND BOWEL MOVEMENTS  Eating more fiber (bran cereal, fruits, and vegetables) and drinking  "plenty of fluids will help to avoid constipation  Urinary frequency and urgency after childbirth is normal  If you experience any urinary pain, burning or frequency call your provider    BIRTH CONTROL  It is possible to become pregnant at any time after delivery and while breastfeeding  Plan to discuss a method of birth control with your physician at your post delivery follow up visit    POSTPARTUM BLUES  During the first few days after birth, you may experience a sense of the \"blues\" which may include impatience, irritability or even crying. These feelings come and go quickly. However, as many as 1 in 10 women experience emotional symptoms known as postpartum depression.     POSTPARTUM DEPRESSION    May start as early as the second or third day after delivery or take several weeks or months to develop. Symptoms of \"blues\" are present, but are more intense: Crying spells; loss of appetite; feelings of hopelessness or loss of control; fear of touching the baby; over concern or no concern at all about the baby; little or no concern about your own appearance/caring for yourself; and/or inability to sleep or excessive sleeping. Contact your Obstetrician if you are experiencing any of these symptoms     PREVENTING SHAKEN BABY  If you are angry or stressed, PUT THE BABY IN THE CRIB, step away, take some deep breaths, and wait until you are calm to care for the baby. DO NOT SHAKE THE BABY. You are not alone, call a supporter for help.  [facility] 24/7 crisis call line (052-244-1942) or (1-489.484.8016)  You can also text them, text \"ANSWER\" (406372)  " Detail Level: Detailed

## 2025-07-11 NOTE — ANESTHESIA POSTPROCEDURE EVALUATION
Patient: Dee Cornejo    Procedure Summary       Date: 07/10/25 Room / Location:     Anesthesia Start: 1015 Anesthesia Stop: 1535    Procedure: Labor Epidural Diagnosis:     Scheduled Providers:  Responsible Provider: Amauri Deal MD, PhD    Anesthesia Type: epidural ASA Status: 2            Final Anesthesia Type: epidural  Last vitals  BP   Blood Pressure: 134/61    Temp   36.2 °C (97.1 °F)    Pulse   96   Resp   17    SpO2   95 %      Anesthesia Post Evaluation    Patient location during evaluation: PACU  Patient participation: complete - patient participated  Level of consciousness: awake  Pain score: 2    Airway patency: patent  Anesthetic complications: no  Cardiovascular status: hemodynamically stable  Respiratory status: acceptable  Hydration status: acceptable    PONV: none          No notable events documented.     Nurse Pain Score: 2 (NPRS)           Detail Level: Zone Detail Level: Simple Detail Level: Generalized

## 2025-07-11 NOTE — DISCHARGE PLANNING
Discharge Planning Assessment Post Partum    Reason for Referral: History of anxiety and depression   Address: [address] [place] [address] [place] [address]  Phone: [phone]  Type of Living Situation: stable housing   Mom Diagnosis: Pregnancy, vaginal delivery   Baby Diagnosis: -39.4 weeks   Primary Language: English     Father of the Baby: Norris Khan   Involved in baby’s care? Yes  Contact Information: [phone]    Prenatal Care: Yes-Dr. Gardner   Mom's PCP: DAVID Mueller   PCP for new baby: Dr. Abdul     Support System: FOB  Coping/Bonding between mother & baby: Yes  Source of Feeding: breast and bottle   Supplies for Infant: prepared for infant; denies any needs    Mom's Insurance: [place]   Baby Covered on Insurance: Yes  Mother Employed/School: [employer]   Other children in the home/names & ages: two children; ages 15 and 8 years     Financial Hardship/Income: No   Mom's Mental status: alert and oriented   Services used prior to admit: None     CPS History: No  Psychiatric History: History of anxiety and depression.  Discussed with mother and provided PPD/PPA resources  Domestic Violence History: No  Drug/ETOH History: No    Resources Provided: PPD/PPA handout   Referrals Made: None      Clearance for Discharge: Infant is cleared to discharge home with parents once medically cleared        arrived ambulatory with on/off midsternal chest pain for aprox 10days Pt also reports SOB

## 2025-07-11 NOTE — PROGRESS NOTES
1800 - Patient transferred from labor and delivery in infant with infant in arms and FOB accompanying with belongings. Two RN verification of infant and parent armbands. Report received from DAR Garcia RN. Patient oriented to unit and POC. Assessment completed. Patient educated on need to void by 2130 and to call for assistance when needing to stand up. Patient required 2 person assist to pivot from wheelchair to bed. Per Tala DODGE RN, patient did not attempt to get up to bathroom post delivery due to residual numbness from epidural. Pain management discussed. Patient declines intervention for pain at this time. Will call for PRN pain medication. Parents educated to offer feedings on cue, at minimum of Q3 hours. Parents verbalize understanding and will update infant I&O chart. Parents educated on room, medications, safe sleep, emergency cord, and infant care. Patient encouraged to call with needs. Call light within reach. All concerns and questions answered at this time.

## 2025-07-11 NOTE — ANESTHESIA TIME REPORT
Anesthesia Start and Stop Event Times       Date Time Event    7/10/2025 1010 Ready for Procedure     1015 Anesthesia Start     1535 Anesthesia Stop          Responsible Staff  07/10/25      Name Role Begin End    Amauri Deal MD, PhD Anesth 1015 1535          Overtime Reason:  no overtime (within assigned shift)    Comments:

## 2025-07-11 NOTE — L&D DELIVERY NOTE
DATE OF SERVICE:  07/10/2025     ADMITTING DIAGNOSES:  1.  Intrauterine pregnancy at 39 weeks and 4 days.  2.  Advanced maternal age.  3.  GBS negative.  4.  History of iron deficiency anemia, status post iron infusion during   pregnancy.     PROCEDURES:  1.  Admission to labor and delivery.  2.  Epidural.  3.  Pitocin.  4.  Normal spontaneous vaginal delivery of a vigorous male with Apgars 8 and   9.     DELIVERY:  This patient is a 34-year-old  5, para 2 that was brought in   at 39 weeks for induction of labor.  When she arrived, fetal status was   reassuring.  She was started on Pitocin.  She had an epidural for pain   control.  She had artificial rupture of membranes at 1:12 p.m.  At that time,   she was 5 cm dilated, 80% effaced, -2 station.  The fluid was clear.  Fetal   status remained reassuring with a category 1-2.  The patient progressed   through labor without any problems and was complete at 3:00 p.m.  She was then   prepped and draped in the usual sterile fashion.  At 3:35 p.m., I assisted   with a normal spontaneous vaginal delivery of a vigorous male in LA position   with Apgars 8 and 9.  The head was delivered atraumatically and the rest of   the infant delivered without any problems.  Mouth and nose were bulb   suctioned.  The infant was placed on the maternal abdomen.  Delayed cord   clamping was performed for a minute and a half and then, the cord was doubly   clamped and cut by the infant's father.  The placenta was then delivered   intact at 3:38 p.m.  Three-vessel cord was noted.  The uterus was firm and   hemostatic.  She did have a first-degree vaginal laceration that was repaired   with a figure-of-eight using a 2-0 Vicryl on a CT-1 needle without any   problems.  The patient tolerated the procedure well.  Baby and mom are doing   well.        ______________________________  MD SLIME ARREGUIN/AMCARIO    DD:  07/10/2025 15:48  DT:  07/10/2025 21:03    Job#:  [id number]
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
## 2025-07-11 NOTE — LACTATION NOTE
This note was copied from a baby's chart.  Initial Visit:    39w4d infant delivered vaginally to a  mother 7/10/25 at 1535  Recently documented latch score 8    Feeding Plan: breastfeeding    Breastfeeding History: Dee breast fed her 8 year old son for 1 year without difficulty    Medical History: History of gastric sleeve. No other significant breastfeeding risk factors noted.     Dee reports that she has been breastfeeding her baby without difficulty or discomfort. She reports that he has been feeding per his hunger cues, at least once every three hours, and these feedings have been between 10-20 minutes long. She declines latch assistance at this time. She did request LC demonstrate hand expression, and this yielded drops of colostrum without difficulty.     LC reviewed the milk making process, and supply in demand. Mom was encouraged to break painful latches, and re latch to ensure a deep, asymmetrical latch with each feeding. LC encouraged skin to skin with each feeding, especially if baby is sleepy at feeding time. LC provided additional tips to keep baby engaged during feeding times. Mom should aim for a minimum of about 10-15 minutes of active feeding per breast each feeding time, at least once every three hours. However, if baby is eating more frequently/longer it is perfectly normal and baby should be allowed unlimited time at the breast.     LC provided anticipatory guidance regarding lactogenesis 2, and encouraged mom to exclusively breastfeed, per baby's hunger cues, for 2-4 weeks unless there is a medical indication to initiate pumping and/or supplementing. If she is uncomfortable during times of engorgement, she may gently hand express to comfort, use cool packs or anti inflammatory medications as directed by her MD.     LC reviewed signs of milk transfer, including audible swallows at the breast, frequent wet and soiled diapers, and baby's behavior after feedings. Watch for baby's stool  to transition to yellow/loose/seedy by day 5 of life.     Plan: Continue to feed baby on demand at least 8 times every 24hrs. Offer both breasts at each feeding. Frequent skin to skin. Do not limit baby's time at the breast. Reach out to RN/LC for assistance while inpatient. [facility] outpatient lactation consultant handout provided.

## 2025-07-11 NOTE — PROGRESS NOTES
Assumed care of patient, report received from Ag KENT. Assessment completed. Fundus is firm at and to the right of the umbilicus with light lochia rubra. Personal belongings and call light within reach. Plan of care discussed w/ patient, questions and concerns answered at this time. Educated patient to call for assistance as needed, verbalized understanding. Pitocin infusing at 125mL/hr in PIV 18G left FA, patient tolerating well.     1950: Patient complains 7/10 abdominal and uterus pain, PRN medication given per MAR    2000:  removed at 1525, encouraged patient to void by 2130. Patient up to the bathroom with one person assist. Patient able to void with no difficulty. Jinny-care and partial linen change completed w/ new gown.     2100: Patient called to void again, total urine output 1300mL    2200: Pitocin infusion completed

## 2025-07-11 NOTE — PROGRESS NOTES
0900 - Received report from Ole KENT at change of shift. Assumed care. Assessment completed. Fundus firm, lochia scant. Plan of care reviewed. Instructed FOB to bring car seat to bedside to plan for discharge. Pt will call if med intervention needed. Educated parents to offer feedings on cue, at minimum of Q3 hours and to update I&O chart. Parents verbalized understanding. Encouraged parents to call for assistance when needed. Call light within reach. All questions and concerns answered at this time.     1622 - Discharge paperwork for pt and infant discussed with parents at bedside. All questions answered. Follow up appointment to be made by patient. NBS #2 dates given to parents. Parents verbalize understanding. Paperwork signed and dated at this time. Instructed parents to place infant in car seat and to call RN when finished.     1644 - Infant discharged in car seat with parents. Infant placed in car seat by parents and checked by Karishma KENT. Bands verified. Cuddles removed. Patient escorted off unit in wheelchair with Elly VARGHESE.

## 2025-07-11 NOTE — CARE PLAN
The patient is Stable - Low risk of patient condition declining or worsening    Shift Goals  Clinical Goals: VSS, pain management, fundus firm, lochia WNL, void  Patient Goals: pain control and rest, healthy baby  Family Goals: healthy baby, update on POC    Progress made toward(s) clinical / shift goals: patient able to verbalize pain and request for pain management if needed. Fundus is firm at umbilicus with light lochia rubra. Patient ambulating and voiding as tolerated. POC reviewed w/ patient and FOB, questions answered at this time.      Problem: Pain - Standard  Goal: Alleviation of pain or a reduction in pain to the patient’s comfort goal  Outcome: Progressing     Problem: Knowledge Deficit - Standard  Goal: Patient and family/care givers will demonstrate understanding of plan of care, disease process/condition, diagnostic tests and medications  Outcome: Progressing    Problem: Altered physiologic condition related to immediate post-delivery state and potential for bleeding/hemorrhage  Goal: Patient physiologically stable as evidenced by normal lochia, palpable uterine involution and vital signs within normal limits  Outcome: Progressing     Problem: Alteration in comfort related to episiotomy, vaginal repair and/or after birth pains  Goal: Patient is able to ambulate, care for self and infant  Outcome: Progressing       Problem: Potential anxiety related to difficulty adapting to parental role  Goal: Patient will verbalize and demonstrate effective bonding and parenting behavior  Outcome: Progressing

## 2025-07-12 DIAGNOSIS — O99.019 IRON DEFICIENCY ANEMIA DURING PREGNANCY: Primary | ICD-10-CM

## 2025-07-12 DIAGNOSIS — D50.9 IRON DEFICIENCY ANEMIA DURING PREGNANCY: Primary | ICD-10-CM

## 2025-07-12 RX ORDER — SODIUM CHLORIDE 9 MG/ML
INJECTION, SOLUTION INTRAVENOUS CONTINUOUS
Status: CANCELLED | OUTPATIENT
Start: 2025-07-12

## 2025-07-12 RX ORDER — METHYLPREDNISOLONE SODIUM SUCCINATE 125 MG/2ML
125 INJECTION, POWDER, LYOPHILIZED, FOR SOLUTION INTRAMUSCULAR; INTRAVENOUS PRN
Status: CANCELLED | OUTPATIENT
Start: 2025-07-12

## 2025-07-12 NOTE — PROGRESS NOTES
Patient s/p spontaneous vaginal delivery with iron deficiency   -recent labs reviewed-  -Last Iron Infusion 5/13/2025 at infusion center-improvement noted in iron studies.  -recheck iron studies show continue deficiency  -recommend iron infusion  -order placed.      Latest Reference Range & Units 07/11/25 04:49   WBC 4.8 - 10.8 K/uL 13.1 (H)   RBC 4.20 - 5.40 M/uL 3.81 (L)   Hemoglobin 12.0 - 16.0 g/dL 10.9 (L)   Hematocrit 37.0 - 47.0 % 34.3 (L)   MCV 81.4 - 97.8 fL 90.0   MCH 27.0 - 33.0 pg 28.6   MCHC 32.2 - 35.5 g/dL 31.8 (L)   RDW 35.9 - 50.0 fL 68.9 (H)   Platelet Count 164 - 446 K/uL 190   MPV 9.0 - 12.9 fL 11.0      Latest Reference Range & Units 07/08/25 12:05   Iron 40 - 170 ug/dL 67   Total Iron Binding 250 - 450 ug/dL 477 (H)   % Saturation 15 - 55 % 14 (L)   Unsat Iron Binding 110 - 370 ug/dL 410 (H)        Latest Reference Range & Units 07/08/25 12:05   Ferritin 10.0 - 291.0 ng/mL 12.8       1. Iron deficiency anemia during pregnancy    Other orders  - iron dextran complex (Infed) 1,000 mg in  mL IVPB  - NS infusion  - methylPREDNISolone sod succ (Solu-Medrol) 125 MG injection 125 mg  - methylPREDNISolone sod succ (Solu-Medrol) 125 MG injection 125 mg

## 2025-07-19 ENCOUNTER — OUTPATIENT INFUSION SERVICES (OUTPATIENT)
Dept: ONCOLOGY | Facility: MEDICAL CENTER | Age: 35
End: 2025-07-19
Attending: NURSE PRACTITIONER
Payer: COMMERCIAL

## 2025-07-19 VITALS
SYSTOLIC BLOOD PRESSURE: 113 MMHG | HEART RATE: 90 BPM | BODY MASS INDEX: 30.67 KG/M2 | RESPIRATION RATE: 18 BRPM | DIASTOLIC BLOOD PRESSURE: 74 MMHG | HEIGHT: 64 IN | WEIGHT: 179.68 LBS | TEMPERATURE: 97.3 F | OXYGEN SATURATION: 93 %

## 2025-07-19 DIAGNOSIS — D50.9 IRON DEFICIENCY ANEMIA DURING PREGNANCY: Primary | ICD-10-CM

## 2025-07-19 DIAGNOSIS — O99.019 IRON DEFICIENCY ANEMIA DURING PREGNANCY: Primary | ICD-10-CM

## 2025-07-19 PROCEDURE — 700105 HCHG RX REV CODE 258: Performed by: NURSE PRACTITIONER

## 2025-07-19 PROCEDURE — 96365 THER/PROPH/DIAG IV INF INIT: CPT

## 2025-07-19 PROCEDURE — 96375 TX/PRO/DX INJ NEW DRUG ADDON: CPT

## 2025-07-19 PROCEDURE — 700111 HCHG RX REV CODE 636 W/ 250 OVERRIDE (IP): Mod: JZ | Performed by: NURSE PRACTITIONER

## 2025-07-19 RX ORDER — ONDANSETRON 8 MG/1
8 TABLET, ORALLY DISINTEGRATING ORAL PRN
Status: CANCELLED | OUTPATIENT
Start: 2025-07-19

## 2025-07-19 RX ORDER — SODIUM CHLORIDE 9 MG/ML
INJECTION, SOLUTION INTRAVENOUS CONTINUOUS
Status: CANCELLED | OUTPATIENT
Start: 2025-07-19

## 2025-07-19 RX ORDER — DIPHENHYDRAMINE HYDROCHLORIDE 50 MG/ML
25 INJECTION, SOLUTION INTRAMUSCULAR; INTRAVENOUS PRN
Status: CANCELLED | OUTPATIENT
Start: 2025-07-19

## 2025-07-19 RX ORDER — ONDANSETRON 2 MG/ML
8 INJECTION INTRAMUSCULAR; INTRAVENOUS PRN
Status: CANCELLED | OUTPATIENT
Start: 2025-07-19

## 2025-07-19 RX ORDER — LORAZEPAM 1 MG/1
0.5 TABLET ORAL PRN
Status: CANCELLED | OUTPATIENT
Start: 2025-07-19

## 2025-07-19 RX ORDER — METHYLPREDNISOLONE SODIUM SUCCINATE 125 MG/2ML
125 INJECTION, POWDER, LYOPHILIZED, FOR SOLUTION INTRAMUSCULAR; INTRAVENOUS PRN
Status: DISCONTINUED | OUTPATIENT
Start: 2025-07-19 | End: 2025-07-19 | Stop reason: ALTCHOICE

## 2025-07-19 RX ORDER — EPINEPHRINE 1 MG/ML(1)
0.5 AMPUL (ML) INJECTION PRN
Status: CANCELLED | OUTPATIENT
Start: 2025-07-19

## 2025-07-19 RX ORDER — METHYLPREDNISOLONE SODIUM SUCCINATE 125 MG/2ML
125 INJECTION, POWDER, LYOPHILIZED, FOR SOLUTION INTRAMUSCULAR; INTRAVENOUS PRN
Status: CANCELLED | OUTPATIENT
Start: 2025-07-19

## 2025-07-19 RX ORDER — LORAZEPAM 2 MG/ML
0.5 INJECTION INTRAMUSCULAR PRN
Status: CANCELLED | OUTPATIENT
Start: 2025-07-19

## 2025-07-19 RX ORDER — ALBUTEROL SULFATE 5 MG/ML
2.5 SOLUTION RESPIRATORY (INHALATION) PRN
Status: CANCELLED | OUTPATIENT
Start: 2025-07-19

## 2025-07-19 RX ORDER — SODIUM CHLORIDE 9 MG/ML
1000 INJECTION, SOLUTION INTRAVENOUS PRN
Status: CANCELLED | OUTPATIENT
Start: 2025-07-19

## 2025-07-19 RX ORDER — SODIUM CHLORIDE 9 MG/ML
INJECTION, SOLUTION INTRAVENOUS CONTINUOUS
Status: DISCONTINUED | OUTPATIENT
Start: 2025-07-19 | End: 2025-07-19 | Stop reason: ALTCHOICE

## 2025-07-19 RX ORDER — MEPERIDINE HYDROCHLORIDE 25 MG/ML
25 INJECTION INTRAMUSCULAR; INTRAVENOUS; SUBCUTANEOUS PRN
Status: CANCELLED | OUTPATIENT
Start: 2025-07-19

## 2025-07-19 RX ORDER — ACETAMINOPHEN 325 MG/1
650 TABLET ORAL PRN
Status: CANCELLED | OUTPATIENT
Start: 2025-07-19

## 2025-07-19 RX ADMIN — SODIUM CHLORIDE 1000 MG: 9 INJECTION, SOLUTION INTRAVENOUS at 09:46

## 2025-07-19 RX ADMIN — METHYLPREDNISOLONE SODIUM SUCCINATE 125 MG: 125 INJECTION INTRAMUSCULAR; INTRAVENOUS at 09:26

## 2025-07-19 RX ADMIN — SODIUM CHLORIDE: 9 INJECTION, SOLUTION INTRAVENOUS at 09:25

## 2025-07-19 ASSESSMENT — FIBROSIS 4 INDEX: FIB4 SCORE: 1.17

## 2025-07-19 NOTE — PROGRESS NOTES
Pt presented to infusion center for iron dextran. POC discussed, including time of treatment, pt agreeable. PIV started, brisk blood return observed. Iron labs reviewed from 7/8. Pre-med given d/t drug allergies per PharmD. Iron Dextran started at 75 ml/hr per orders for 5 mins, paused for 10 mins with no s/s of adverse reaction. Remainder of infusion run over remainder of approx 1 hour with no s/s of adverse reaction. PIV flushed and removed, gauze dressing placed. No further appts needed at this time. Left in NAD.

## 2025-08-04 ENCOUNTER — APPOINTMENT (OUTPATIENT)
Dept: URGENT CARE | Facility: PHYSICIAN GROUP | Age: 35
End: 2025-08-04
Payer: COMMERCIAL

## (undated) DEVICE — TROCAR 5X100 NON BLADED Z-TH - READ KII (6/BX)

## (undated) DEVICE — TUBE NG SALEM SUMP 16FR (50EA/CA)

## (undated) DEVICE — CANNULA W/SEAL 5X100 Z-THRE - ADED KII (12/BX)

## (undated) DEVICE — GOWN SURGEONS X-LARGE - DISP. (30/CA)

## (undated) DEVICE — SET TUBING PNEUMOCLEAR HIGH FLOW SMOKE EVACUATION (10EA/BX)

## (undated) DEVICE — SUTURE 3.5-0 ETHIBOND GREEN CT-2 TAPER (36PK/BX)

## (undated) DEVICE — SYSTEM CALIBARATION  GASTRECTOMY 40FR WITH BULB (5/BX)

## (undated) DEVICE — SODIUM CHL IRRIGATION 0.9% 1000ML (12EA/CA)

## (undated) DEVICE — PROTECTOR ULNA NERVE - (36PR/CA)

## (undated) DEVICE — HEAD HOLDER JUNIOR/ADULT

## (undated) DEVICE — LACTATED RINGERS INJ 1000 ML - (14EA/CA 60CA/PF)

## (undated) DEVICE — GOWN WARMING X-LARGE FLEX - (20/CA)

## (undated) DEVICE — PEN SKIN MARKER W/RULER - (50EA/BX)

## (undated) DEVICE — RELOAD ENDO GIA 60 ART MEDIUM THICK(6EA/CA)

## (undated) DEVICE — SUCTION INSTRUMENT YANKAUER BULBOUS TIP W/O VENT (50EA/CA)

## (undated) DEVICE — SUTURE GENERAL

## (undated) DEVICE — SUTURE 2-0 ETHIBOND GREEN CT-2 TAPER (36PK/BX)

## (undated) DEVICE — ELECTRODE 850 FOAM ADHESIVE - HYDROGEL RADIOTRNSPRNT (50/PK)

## (undated) DEVICE — SET LEADWIRE 5 LEAD BEDSIDE DISPOSABLE ECG (1SET OF 5/EA)

## (undated) DEVICE — TROCAR SEPARATOR 15MMZTHREAD - (6/BX)

## (undated) DEVICE — PACK GASTRIC BANDING OR - (1/CA)

## (undated) DEVICE — DERMABOND ADVANCED - (12EA/BX)

## (undated) DEVICE — SLEEVE, VASO, THIGH, MED

## (undated) DEVICE — CHLORAPREP 26 ML APPLICATOR - ORANGE TINT(25/CA)

## (undated) DEVICE — GLOVE SZ 7.5 BIOGEL PI MICRO - PF LF (50PR/BX)

## (undated) DEVICE — BLADE SURGICAL #15 - (50/BX 3BX/CA)

## (undated) DEVICE — HUMID-VENT HEAT AND MOISTURE EXCHANGE- (50/BX)

## (undated) DEVICE — TROCAR Z THREAD12MM OPTICAL - NON BLADED (6/BX)

## (undated) DEVICE — NEPTUNE 4 PORT MANIFOLD - (20/PK)

## (undated) DEVICE — SENSOR SPO2 NEO LNCS ADHESIVE (20/BX) SEE USER NOTES

## (undated) DEVICE — SLEEVE, VASO, REPROC, LARGE

## (undated) DEVICE — KIT ANESTHESIA W/CIRCUIT & 3/LT BAG W/FILTER (20EA/CA)

## (undated) DEVICE — GLOVE BIOGEL PI INDICATOR SZ 8.0 SURGICAL PF LF -(50/BX 4BX/CA)

## (undated) DEVICE — SHELL STAPLING POWER FOR SIGNIA HANDLE STAPLING SYS(6EA/PK)

## (undated) DEVICE — BAG SPONGE COUNT 10.25 X 32 - BLUE (250/CA)

## (undated) DEVICE — HANDPIECE THUNDERBEAT 5MM 45CM FRONT GRIP TYPE S (5EA/BX)

## (undated) DEVICE — GLOVE BIOGEL SZ 8 SURGICAL PF LTX - (50PR/BX 4BX/CA)

## (undated) DEVICE — GLOVE BIOGEL INDICATOR SZ 8 SURGICAL PF LTX - (50/BX 4BX/CA)

## (undated) DEVICE — GLOVE BIOGEL SZ 8.5 SURGICAL PF LTX - (50PR/BX 4BX/CA)

## (undated) DEVICE — MASK ANESTHESIA ADULT  - (100/CA)

## (undated) DEVICE — GOWN WARMING STANDARD FLEX - (30/CA)

## (undated) DEVICE — CANISTER SUCTION 3000ML MECHANICAL FILTER AUTO SHUTOFF MEDI-VAC NONSTERILE LF DISP  (40EA/CA)

## (undated) DEVICE — TOWEL STOP TIMEOUT SAFETY FLAG (40EA/CA)

## (undated) DEVICE — TUBING LAPAROSCOPIC PLUME DEVICE (10EA/CA)

## (undated) DEVICE — ELECTRODE DUAL RETURN W/ CORD - (50/PK)

## (undated) DEVICE — TUBING CLEARLINK DUO-VENT - C-FLO (48EA/CA)

## (undated) DEVICE — SYSTEM CLEARIFY VISUALIZATION (10EA/PK)

## (undated) DEVICE — SET EXTENSION WITH 2 PORTS (48EA/CA) ***PART #2C8610 IS A SUBSTITUTE*****

## (undated) DEVICE — SUTURE 4-0 MONOCRYL PLUS PS-2 - 27 INCH (36/BX)

## (undated) DEVICE — SET SUCTION/IRRIGATION WITH DISPOSABLE TIP (6/CA )PART #0250-070-520 IS A SUB